# Patient Record
Sex: MALE | Race: WHITE | Employment: UNEMPLOYED | URBAN - METROPOLITAN AREA
[De-identification: names, ages, dates, MRNs, and addresses within clinical notes are randomized per-mention and may not be internally consistent; named-entity substitution may affect disease eponyms.]

---

## 2024-08-03 ENCOUNTER — HOSPITAL ENCOUNTER (INPATIENT)
Facility: HOSPITAL | Age: 69
LOS: 58 days | Discharge: HOME | DRG: 885 | End: 2024-09-30
Attending: EMERGENCY MEDICINE | Admitting: PSYCHIATRY & NEUROLOGY
Payer: MEDICARE

## 2024-08-03 DIAGNOSIS — F32.9 MAJOR DEPRESSIVE DISORDER WITHOUT PSYCHOTIC FEATURES: Primary | ICD-10-CM

## 2024-08-03 DIAGNOSIS — F41.1 ANXIETY STATE: ICD-10-CM

## 2024-08-03 DIAGNOSIS — F32.A DEPRESSION, UNSPECIFIED DEPRESSION TYPE: ICD-10-CM

## 2024-08-03 DIAGNOSIS — R45.851 SUICIDAL IDEATION: ICD-10-CM

## 2024-08-03 LAB
ALBUMIN SERPL-MCNC: 4.3 G/DL (ref 3.4–5)
ALBUMIN/GLOB SERPL: 1.2 (ref 0.8–1.7)
ALP SERPL-CCNC: 130 U/L (ref 45–117)
ALT SERPL-CCNC: 24 U/L (ref 16–61)
AMPHET UR QL SCN: NEGATIVE
ANION GAP SERPL CALC-SCNC: 3 MMOL/L (ref 3–18)
AST SERPL-CCNC: 18 U/L (ref 10–38)
BARBITURATES UR QL SCN: NEGATIVE
BASOPHILS # BLD: 0 K/UL (ref 0–0.1)
BASOPHILS NFR BLD: 1 % (ref 0–2)
BENZODIAZ UR QL: NEGATIVE
BILIRUB SERPL-MCNC: 0.7 MG/DL (ref 0.2–1)
BUN SERPL-MCNC: 12 MG/DL (ref 7–18)
BUN/CREAT SERPL: 8 (ref 12–20)
CALCIUM SERPL-MCNC: 9.9 MG/DL (ref 8.5–10.1)
CANNABINOIDS UR QL SCN: NEGATIVE
CHLORIDE SERPL-SCNC: 109 MMOL/L (ref 100–111)
CO2 SERPL-SCNC: 27 MMOL/L (ref 21–32)
COCAINE UR QL SCN: NEGATIVE
CREAT SERPL-MCNC: 1.46 MG/DL (ref 0.6–1.3)
DIFFERENTIAL METHOD BLD: ABNORMAL
EOSINOPHIL # BLD: 0.2 K/UL (ref 0–0.4)
EOSINOPHIL NFR BLD: 4 % (ref 0–5)
ERYTHROCYTE [DISTWIDTH] IN BLOOD BY AUTOMATED COUNT: 12.4 % (ref 11.6–14.5)
ETHANOL SERPL-MCNC: <3 MG/DL (ref 0–3)
FLUAV RNA SPEC QL NAA+PROBE: NOT DETECTED
FLUBV RNA SPEC QL NAA+PROBE: NOT DETECTED
GLOBULIN SER CALC-MCNC: 3.6 G/DL (ref 2–4)
GLUCOSE SERPL-MCNC: 100 MG/DL (ref 74–99)
HCT VFR BLD AUTO: 44.2 % (ref 36–48)
HGB BLD-MCNC: 14.8 G/DL (ref 13–16)
IMM GRANULOCYTES # BLD AUTO: 0 K/UL (ref 0–0.04)
IMM GRANULOCYTES NFR BLD AUTO: 0 % (ref 0–0.5)
LYMPHOCYTES # BLD: 0.9 K/UL (ref 0.9–3.6)
LYMPHOCYTES NFR BLD: 16 % (ref 21–52)
Lab: NORMAL
MCH RBC QN AUTO: 31.6 PG (ref 24–34)
MCHC RBC AUTO-ENTMCNC: 33.5 G/DL (ref 31–37)
MCV RBC AUTO: 94.2 FL (ref 78–100)
METHADONE UR QL: NEGATIVE
MONOCYTES # BLD: 0.5 K/UL (ref 0.05–1.2)
MONOCYTES NFR BLD: 9 % (ref 3–10)
NEUTS SEG # BLD: 4.3 K/UL (ref 1.8–8)
NEUTS SEG NFR BLD: 71 % (ref 40–73)
NRBC # BLD: 0 K/UL (ref 0–0.01)
NRBC BLD-RTO: 0 PER 100 WBC
OPIATES UR QL: NEGATIVE
PCP UR QL: NEGATIVE
PLATELET # BLD AUTO: 208 K/UL (ref 135–420)
PMV BLD AUTO: 8.5 FL (ref 9.2–11.8)
POTASSIUM SERPL-SCNC: 4.4 MMOL/L (ref 3.5–5.5)
PROT SERPL-MCNC: 7.9 G/DL (ref 6.4–8.2)
RBC # BLD AUTO: 4.69 M/UL (ref 4.35–5.65)
SARS-COV-2 RNA RESP QL NAA+PROBE: NOT DETECTED
SODIUM SERPL-SCNC: 139 MMOL/L (ref 136–145)
WBC # BLD AUTO: 6 K/UL (ref 4.6–13.2)

## 2024-08-03 PROCEDURE — 93005 ELECTROCARDIOGRAM TRACING: CPT | Performed by: EMERGENCY MEDICINE

## 2024-08-03 PROCEDURE — 6370000000 HC RX 637 (ALT 250 FOR IP): Performed by: EMERGENCY MEDICINE

## 2024-08-03 PROCEDURE — 87636 SARSCOV2 & INF A&B AMP PRB: CPT

## 2024-08-03 PROCEDURE — 82077 ASSAY SPEC XCP UR&BREATH IA: CPT

## 2024-08-03 PROCEDURE — 85025 COMPLETE CBC W/AUTO DIFF WBC: CPT

## 2024-08-03 PROCEDURE — 1240000000 HC EMOTIONAL WELLNESS R&B

## 2024-08-03 PROCEDURE — 80053 COMPREHEN METABOLIC PANEL: CPT

## 2024-08-03 PROCEDURE — 6370000000 HC RX 637 (ALT 250 FOR IP): Performed by: PSYCHIATRY & NEUROLOGY

## 2024-08-03 PROCEDURE — 90791 PSYCH DIAGNOSTIC EVALUATION: CPT | Performed by: SOCIAL WORKER

## 2024-08-03 PROCEDURE — 80307 DRUG TEST PRSMV CHEM ANLYZR: CPT

## 2024-08-03 PROCEDURE — 99285 EMERGENCY DEPT VISIT HI MDM: CPT

## 2024-08-03 RX ORDER — VITAMIN B COMPLEX
2000 TABLET ORAL DAILY
Status: DISCONTINUED | OUTPATIENT
Start: 2024-08-04 | End: 2024-09-30 | Stop reason: HOSPADM

## 2024-08-03 RX ORDER — DULOXETIN HYDROCHLORIDE 20 MG/1
60 CAPSULE, DELAYED RELEASE ORAL 2 TIMES DAILY
Status: DISCONTINUED | OUTPATIENT
Start: 2024-08-03 | End: 2024-09-30 | Stop reason: HOSPADM

## 2024-08-03 RX ORDER — TRAZODONE HYDROCHLORIDE 50 MG/1
50 TABLET, FILM COATED ORAL NIGHTLY PRN
Status: DISCONTINUED | OUTPATIENT
Start: 2024-08-03 | End: 2024-09-30 | Stop reason: HOSPADM

## 2024-08-03 RX ORDER — CLONAZEPAM 1 MG/1
1 TABLET ORAL 2 TIMES DAILY
Status: DISCONTINUED | OUTPATIENT
Start: 2024-08-03 | End: 2024-08-04

## 2024-08-03 RX ORDER — LORAZEPAM 1 MG/1
2 TABLET ORAL ONCE
Status: COMPLETED | OUTPATIENT
Start: 2024-08-03 | End: 2024-08-03

## 2024-08-03 RX ORDER — MAGNESIUM HYDROXIDE/ALUMINUM HYDROXICE/SIMETHICONE 120; 1200; 1200 MG/30ML; MG/30ML; MG/30ML
30 SUSPENSION ORAL EVERY 6 HOURS PRN
Status: DISCONTINUED | OUTPATIENT
Start: 2024-08-03 | End: 2024-09-30 | Stop reason: HOSPADM

## 2024-08-03 RX ORDER — ATORVASTATIN CALCIUM 20 MG/1
20 TABLET, FILM COATED ORAL NIGHTLY
Status: DISCONTINUED | OUTPATIENT
Start: 2024-08-03 | End: 2024-09-30 | Stop reason: HOSPADM

## 2024-08-03 RX ORDER — HYDROXYZINE HYDROCHLORIDE 25 MG/1
25 TABLET, FILM COATED ORAL EVERY 6 HOURS PRN
Status: DISCONTINUED | OUTPATIENT
Start: 2024-08-03 | End: 2024-08-06

## 2024-08-03 RX ORDER — ACETAMINOPHEN 325 MG/1
650 TABLET ORAL EVERY 4 HOURS PRN
Status: DISCONTINUED | OUTPATIENT
Start: 2024-08-03 | End: 2024-09-30 | Stop reason: HOSPADM

## 2024-08-03 RX ORDER — ASPIRIN 81 MG/1
81 TABLET ORAL DAILY
Status: DISCONTINUED | OUTPATIENT
Start: 2024-08-04 | End: 2024-09-30 | Stop reason: HOSPADM

## 2024-08-03 RX ADMIN — DULOXETINE HYDROCHLORIDE 60 MG: 30 CAPSULE, DELAYED RELEASE ORAL at 21:19

## 2024-08-03 RX ADMIN — CLONAZEPAM 1 MG: 1 TABLET ORAL at 21:20

## 2024-08-03 RX ADMIN — AMITRIPTYLINE HYDROCHLORIDE 75 MG: 25 TABLET, FILM COATED ORAL at 22:28

## 2024-08-03 RX ADMIN — HYDROXYZINE HYDROCHLORIDE 25 MG: 25 TABLET, FILM COATED ORAL at 21:19

## 2024-08-03 RX ADMIN — LAMOTRIGINE 150 MG: 100 TABLET ORAL at 21:19

## 2024-08-03 RX ADMIN — ATORVASTATIN CALCIUM 20 MG: 20 TABLET, FILM COATED ORAL at 21:20

## 2024-08-03 RX ADMIN — LORAZEPAM 2 MG: 1 TABLET ORAL at 17:47

## 2024-08-03 ASSESSMENT — SLEEP AND FATIGUE QUESTIONNAIRES
DO YOU HAVE DIFFICULTY SLEEPING: NO
DO YOU USE A SLEEP AID: NO
AVERAGE NUMBER OF SLEEP HOURS: 7

## 2024-08-03 ASSESSMENT — PAIN - FUNCTIONAL ASSESSMENT: PAIN_FUNCTIONAL_ASSESSMENT: NONE - DENIES PAIN

## 2024-08-03 ASSESSMENT — LIFESTYLE VARIABLES
HOW OFTEN DO YOU HAVE A DRINK CONTAINING ALCOHOL: MONTHLY OR LESS
HOW MANY STANDARD DRINKS CONTAINING ALCOHOL DO YOU HAVE ON A TYPICAL DAY: 1 OR 2

## 2024-08-03 NOTE — ED NOTES
Report called and given to LUCA Aldana. [facility] accepted pt. Per Jovan, room not ready for patient and asked to give 15 minutes to finish preparing room.  Will transport pt up when room is ready.

## 2024-08-03 NOTE — ED NOTES
Pt very restless and fidgety, asking for something to help calm him. Pt states the ativan has not helped at all and it is only getting worst. Pts wife  at bedside.

## 2024-08-03 NOTE — ED TRIAGE NOTES
Pt in ED via EMS EMS for suicidal ideations. Pt states he has been battling depression for a while and today his wife left while he was asleep for a few hours and her leaving him for so long triggered his depression and made him feel suicidal. Pt states he has a gun locker at home and planned to take one of his guns and go somewhere and shoot himself.

## 2024-08-03 NOTE — ED TRIAGE NOTES
Pt brought by EMS ,pt stated that he starting to have thoughts of harming himself. No specific plan at this time

## 2024-08-03 NOTE — BSMART NOTE
Chief Complaint   Patient presents with    Suicidal       Patient was evaluated by Tele-Psych who recommends inpatient psychiatric treatment for suicidal ideations with a plan to shoot self. Does have guns in the home. Wife is with patient in the ER and states the guns will be removed while he is being treated in the hospital.    Patient is voluntary for inpatient treatment.    Past Medical History:   Diagnosis Date    Anxiety and depression     Cervical radiculopathy     Dr. Rahman    Diverticulitis     Fatty liver disease, nonalcoholic 10/14    ALT up    Gout     Hep C w/o coma, chronic (HCC)     Herniated disc, cervical     C5-C6    Hydrocele     Hydrocele     Hyperlipidemia     Insomnia     Kidney stone     Sleep apnea     uses CPAP    Stroke (HCC)     TIA in the past but cannot remember       Prior to Visit Medications    Medication Sig Taking? Authorizing Provider   clonazePAM (KLONOPIN) 1 MG tablet Take 1.5 tablets by mouth 2 times daily.  Automatic Reconciliation, Ar   colchicine (COLCRYS) 0.6 MG tablet Take 0.6 mg by mouth 2 times daily as needed  Patient not taking: Reported on 8/3/2024  Automatic Reconciliation, Ar   DULoxetine (CYMBALTA) 60 MG extended release capsule Take 1 capsule by mouth 2 times daily  Automatic Reconciliation, Ar   hydrOXYzine pamoate (VISTARIL) 50 MG capsule Take 50 mg by mouth daily  Patient not taking: Reported on 8/3/2024  Automatic Reconciliation, Ar   ibuprofen (ADVIL;MOTRIN) 800 MG tablet Take 800 mg by mouth every 8 hours as needed  Automatic Reconciliation, Ar   lamoTRIgine (LAMICTAL) 100 MG tablet Take 1 tablet by mouth 2 times daily  Automatic Reconciliation, Ar         The following labs and vitals were reviewed with on-call psychiatrist.    CMP, CBC, ETOH, UDS, SARS- CoV-2-PCR, and EKG    Vitals:    08/03/24 1500   BP: (!) 144/88   Pulse: 77   Resp: 18   Temp: 99.2 °F (37.3 °C)   SpO2: 96%         Writer met with patient to assess the following    Ambulation - Patient

## 2024-08-03 NOTE — VIRTUAL HEALTH
Hernandez Franz  [id number]  1955     Social Work Behavioral Health Crisis Assessment    08/03/24    Chief Complaint: Plans to kill self with gun     HPI: Patient is a 68 y.o. White (non-) male who presents for psychiatric evaluation. Patient presented to the ED on 08/03/24 from home.    Past Psychiatric History:  Previous Diagnoses/symptoms: depression and anxiety   Previous suicide attempts/self-harm: Denies  Inpatient psychiatric hospitalizations: no  Current outpatient psychiatric provider: [facility]   Current therapist: States not in therapy  Previous psychiatric medication trials: No prior medication trials  Current psychiatric medications: No current psychiatric medications  Family Psychiatric History: Denies    Sleep Hours: 7.5    Sleep concerns: difficulty attaining sleep    Use of sleep medications: denies    Substance Abuse History:  Tobacco: Denies  Alcohol: Denies  Marijuana: Denies  Stimulant: Denies  Opiates: Denies  Benzodiazepine: Denies  Other illicit drug usage: Denies  History of substance/alcohol abuse treatment: Denies    Social History:  Education: H.S.  Living Situation/Interest: Lives with wife and dog   Marital/Committed relationship and parenting hx:   Occupation: retired   Legal History/Hx of Violence: denied   Spiritual History: Restorationism   Psychological trauma, neglect, or abuse: denies hx of trauma/abuse   Access to guns or other weapons: gun in the home, and will be removing the guns     Past Medical History:  Active Ambulatory Problems     Diagnosis Date Noted    Fatty liver disease, nonalcoholic     Diverticulitis     Prediabetes     Diverticulosis of large intestine without hemorrhage 02/22/2016    Cervical radiculopathy     Herniated disc, cervical     Elevated fasting glucose     Hep C w/o coma, chronic (HCC)     Anxiety and depression     Hyperlipidemia     Hydrocele, left 08/06/2013    Gout     Erectile dysfunction 02/22/2016     Resolved

## 2024-08-03 NOTE — ED PROVIDER NOTES
EMERGENCY DEPARTMENT HISTORY AND PHYSICAL EXAM      Patient Name: Hernandez Franz  MRN: [id number]  YOB: 1955  Provider: Monique Gunn MD  PCP: Tiffanie Concepcion DO   Time/Date of evaluation: 4:47 PM EDT on 8/3/24    History of Presenting Illness     Chief Complaint   Patient presents with    Suicidal       History Provided By: Patient and Patient's Wife     History (Narative):   Hernandez Franz is a 68 y.o. male with a PMHX of anxiety and depression, gout, hepatitis C, hyperlipidemia, insomnia, kidney stones, and a prior TIA  who presents to the emergency department  by EMS C/O suicidal ideation.  The patient and his wife stated that he was having a lot of anxiety yesterday.  Today, his wife left for a little while.  He started to get very anxious and was very panicky.  He thought about getting his gun and shooting himself.  He called his wife and told her to come right back home because she was just little bit of the street.  When she came home, she tried to find him a Klonopin pill to take because that usually helps with his anxiety.  At that point, they decided to call EMS.    The patient has had 2 prior inpatient hospitalizations for psychiatric issues in the past.        Past History     Past Medical History:  Past Medical History:   Diagnosis Date    Anxiety and depression     Cervical radiculopathy     Dr. Rahman    Diverticulitis     Fatty liver disease, nonalcoholic 10/14    ALT up    Gout     Hep C w/o coma, chronic (HCC)     Herniated disc, cervical     C5-C6    Hydrocele     Hydrocele     Hyperlipidemia     Insomnia     Kidney stone     Sleep apnea     uses CPAP    Stroke (HCC)     TIA in the past but cannot remember       Past Surgical History:  Past Surgical History:   Procedure Laterality Date    CARPAL TUNNEL RELEASE      COLONOSCOPY  12/15    repeat 12/20 - Dr. Escalona    LUMBAR DISCECTOMY      L4-L5    MOHS SURGERY      OTHER SURGICAL HISTORY      hemorrhoid    OTHER SURGICAL

## 2024-08-04 LAB
EKG ATRIAL RATE: 67 BPM
EKG DIAGNOSIS: NORMAL
EKG P AXIS: 39 DEGREES
EKG P-R INTERVAL: 160 MS
EKG Q-T INTERVAL: 404 MS
EKG QRS DURATION: 86 MS
EKG QTC CALCULATION (BAZETT): 426 MS
EKG R AXIS: 12 DEGREES
EKG T AXIS: 7 DEGREES
EKG VENTRICULAR RATE: 67 BPM

## 2024-08-04 PROCEDURE — 1240000000 HC EMOTIONAL WELLNESS R&B

## 2024-08-04 PROCEDURE — 99222 1ST HOSP IP/OBS MODERATE 55: CPT | Performed by: PSYCHIATRY & NEUROLOGY

## 2024-08-04 PROCEDURE — 6370000000 HC RX 637 (ALT 250 FOR IP): Performed by: PSYCHIATRY & NEUROLOGY

## 2024-08-04 PROCEDURE — 2580000003 HC RX 258: Performed by: PSYCHIATRY & NEUROLOGY

## 2024-08-04 PROCEDURE — 6360000002 HC RX W HCPCS: Performed by: PSYCHIATRY & NEUROLOGY

## 2024-08-04 PROCEDURE — 93010 ELECTROCARDIOGRAM REPORT: CPT | Performed by: INTERNAL MEDICINE

## 2024-08-04 RX ORDER — COLCHICINE 0.6 MG/1
1.2 CAPSULE ORAL
Status: DISPENSED | OUTPATIENT
Start: 2024-08-04 | End: 2024-08-05

## 2024-08-04 RX ORDER — LORAZEPAM 2 MG/ML
2 INJECTION INTRAMUSCULAR ONCE
Status: DISCONTINUED | OUTPATIENT
Start: 2024-08-04 | End: 2024-08-04

## 2024-08-04 RX ORDER — COLCHICINE 0.6 MG/1
0.6 CAPSULE ORAL 2 TIMES DAILY PRN
Status: DISCONTINUED | OUTPATIENT
Start: 2024-08-05 | End: 2024-08-19

## 2024-08-04 RX ORDER — LORAZEPAM 1 MG/1
2 TABLET ORAL EVERY 6 HOURS PRN
Status: DISCONTINUED | OUTPATIENT
Start: 2024-08-04 | End: 2024-08-06

## 2024-08-04 RX ORDER — LORAZEPAM 2 MG/ML
2 INJECTION INTRAMUSCULAR ONCE
Status: COMPLETED | OUTPATIENT
Start: 2024-08-04 | End: 2024-08-04

## 2024-08-04 RX ORDER — LORAZEPAM 1 MG/1
1 TABLET ORAL 3 TIMES DAILY
Status: DISCONTINUED | OUTPATIENT
Start: 2024-08-04 | End: 2024-08-09

## 2024-08-04 RX ADMIN — Medication 2000 UNITS: at 08:50

## 2024-08-04 RX ADMIN — CLONAZEPAM 1 MG: 1 TABLET ORAL at 08:51

## 2024-08-04 RX ADMIN — ATORVASTATIN CALCIUM 20 MG: 20 TABLET, FILM COATED ORAL at 20:19

## 2024-08-04 RX ADMIN — WATER 5 MG: 1 INJECTION INTRAMUSCULAR; INTRAVENOUS; SUBCUTANEOUS at 20:13

## 2024-08-04 RX ADMIN — LAMOTRIGINE 150 MG: 100 TABLET ORAL at 20:19

## 2024-08-04 RX ADMIN — DULOXETINE HYDROCHLORIDE 60 MG: 30 CAPSULE, DELAYED RELEASE ORAL at 20:18

## 2024-08-04 RX ADMIN — LAMOTRIGINE 150 MG: 100 TABLET ORAL at 08:51

## 2024-08-04 RX ADMIN — ASPIRIN 81 MG: 81 TABLET, COATED ORAL at 08:51

## 2024-08-04 RX ADMIN — AMITRIPTYLINE HYDROCHLORIDE 75 MG: 25 TABLET, FILM COATED ORAL at 20:19

## 2024-08-04 RX ADMIN — HYDROXYZINE HYDROCHLORIDE 25 MG: 25 TABLET, FILM COATED ORAL at 18:02

## 2024-08-04 RX ADMIN — LORAZEPAM 2 MG: 2 INJECTION INTRAMUSCULAR; INTRAVENOUS at 20:12

## 2024-08-04 RX ADMIN — DULOXETINE HYDROCHLORIDE 60 MG: 30 CAPSULE, DELAYED RELEASE ORAL at 08:50

## 2024-08-04 NOTE — H&P
NEW ADMISSION PSYCHIATRIC H&P    IDENTIFYING INFORMATION  The patient is a 68-year-old male, , admitted to [facility] voluntarily on the above-mentioned date.    BASIS FOR ADMISSION  The patient was admitted after he was brought in to the emergency department by EMS, complaining of suicidal ideation and fears of losing control.  Patient known to the undersigned since I had opportunity of providing him with outpatient psychiatric care many years ago, and is being followed in our practice by my associate, Dr. Obi Caldwell, has had a history of depression and anxiety deformity multifactorial and for many years now.  This time however the patient became increasingly helpless and hopeless thought about getting his gun and shooting himself.  Noticing himself that he was getting very close to harming himself, the patient called his wife and asked her to come back home.  After doing so, the patient fell himself under somewhat better control, however still depressed and actively suicidal to make the appropriate decision to come to the hospital for further evaluation and possible admission.    PSYCHIATRIC HISTORY  As stated above, the patient has had a chronic history of anxiety and also depression.  I had opportunity of providing him with outpatient care through my practice at [facility], but in addition I was also able to provide him with 1 inpatient hospitalization at [facility] this happening between 2016 or so, or 2018 with his care being eventually transferred to my associate Dr. Caldwell due to his being more available the undersigned to provide with evening outpatient hours.  Prior to Dr. Caldwell and my involvement however the patient had seeing Mr. Gab Flores NP also working after [facility] with the patient describing improvement throughout the years with the severity of his symptoms.  However at present, the patient

## 2024-08-04 NOTE — CONSULTS
[facility]  FAMILY MEDICINE CONSULT NOTE FOR BEHAVIORAL HEALTH UNIT    Patient:    Hernandez Franz , 68 y.o. male   Room/Bed:  112/01  Admission Date:   8/3/2024  Code status:  Full Code      Reason for Consult: medical management  Psychiatry Attending Requesting Consult: Dr. Obi Caldwell MD    ASSESSMENT:  Hernandez Franz is a 68 y.o. male w/ a PMH of anxiety and depression, gout, hepatitis C, hyperlipidemia, insomnia, kidney stones, and a prior TIA presenting for suicidal ideation with plan who is now admitted to the [facility].    Nurse Chaperone during History and Physical:     PLAN:    Depression with Sucidal ideation   -Amitriptyline 75 mg nightly  -Clonazepam 1 mg twice daily  -Duloxetine 60 mg twice daily  -Lamotrigine 150 mg twice daily  -Hydroxyzine 25 mg every 6 hours as needed  -Trazodone 50 mg nightly as needed  -Management per inpatient psychiatry    Hx of TIA  -ASA 81mg  -Atorvastatin 20mg nightly      Gout of R wrist   -pt reports flair 3-4 days ago  -has adverse reaction to allopurinol   Plan:  -Colchicine as needed for flare, okay to use with current CrCl      CKD stage III A  Creatinine in ER appears to be around baseline  Plan:  -Avoid nephrotoxic medications      Global  Diet: regular  Mobility: as tolerated    Thank you for this consult.     SUBJECTIVE:   Dr. Obi Caldwell MD has consulted Family Medicine to evaluate Hernandez Franz  in the Emergency Department. He  a 68 y.o. male with a PMHX of anxiety and depression, gout, hepatitis C, hyperlipidemia, insomnia, kidney stones, and a prior TIA  who presents to the emergency department  by EMS C/O suicidal ideation. Per ER note: The patient and his wife stated that he was having a lot of anxiety day prior to admission.  On the day of admission, his wife left for a little while.  He started to get very anxious and was very panicky.  He thought about getting his gun and shooting himself.  He called his

## 2024-08-05 PROCEDURE — 6370000000 HC RX 637 (ALT 250 FOR IP): Performed by: PSYCHIATRY & NEUROLOGY

## 2024-08-05 PROCEDURE — 1240000000 HC EMOTIONAL WELLNESS R&B

## 2024-08-05 PROCEDURE — 99232 SBSQ HOSP IP/OBS MODERATE 35: CPT | Performed by: PSYCHIATRY & NEUROLOGY

## 2024-08-05 RX ORDER — AMITRIPTYLINE HYDROCHLORIDE 50 MG/1
100 TABLET ORAL NIGHTLY
Status: DISCONTINUED | OUTPATIENT
Start: 2024-08-05 | End: 2024-09-05

## 2024-08-05 RX ADMIN — LORAZEPAM 1 MG: 1 TABLET ORAL at 14:08

## 2024-08-05 RX ADMIN — ASPIRIN 81 MG: 81 TABLET, COATED ORAL at 08:03

## 2024-08-05 RX ADMIN — ATORVASTATIN CALCIUM 20 MG: 20 TABLET, FILM COATED ORAL at 20:17

## 2024-08-05 RX ADMIN — LAMOTRIGINE 150 MG: 100 TABLET ORAL at 08:03

## 2024-08-05 RX ADMIN — DULOXETINE HYDROCHLORIDE 60 MG: 30 CAPSULE, DELAYED RELEASE ORAL at 08:03

## 2024-08-05 RX ADMIN — LORAZEPAM 1 MG: 1 TABLET ORAL at 20:16

## 2024-08-05 RX ADMIN — TRAZODONE HYDROCHLORIDE 50 MG: 50 TABLET ORAL at 20:17

## 2024-08-05 RX ADMIN — LAMOTRIGINE 150 MG: 100 TABLET ORAL at 20:15

## 2024-08-05 RX ADMIN — Medication 2000 UNITS: at 08:03

## 2024-08-05 RX ADMIN — LORAZEPAM 1 MG: 1 TABLET ORAL at 08:03

## 2024-08-05 RX ADMIN — AMITRIPTYLINE HYDROCHLORIDE 100 MG: 50 TABLET, FILM COATED ORAL at 20:17

## 2024-08-05 RX ADMIN — DULOXETINE HYDROCHLORIDE 60 MG: 30 CAPSULE, DELAYED RELEASE ORAL at 20:19

## 2024-08-05 NOTE — GROUP NOTE
Art Therapy Group Progress Note    PATIENT SCHEDULED FOR GROUP AT: 1:00 PM     GROUP STOP TIME:  1:45 PM        ATTENDANCE: Moderate (8/13 participants)     TOPIC / FOCUS:  [place]     GOALS:  promote positive thinking skills, encourage self-esteem, identify positive traits in self, develop communication skills, promote goal setting skills, encourage focus, increase self-awareness, promote creativity    Notes:  Pt appeared to have difficulty understanding the task directions. Pt did not appear to be confident in their response to the task. Pt was able to identify positive traits. Pt engagement in art making was minimal and low energy. Pt shared in group discussion when called upon.     Status After Intervention:  Unchanged    Participation Level: Minimal    Participation Quality: Appropriate      Speech:  normal      Thought Process/Content: Logical      Affective Functioning: Congruent      Mood: anxious and dysphoric      Level of consciousness:  Alert      Response to Learning: Able to verbalize current knowledge/experience      Endings: None Reported    Modes of Intervention: Education, Support, Socialization, Exploration, and Clarifying      Discipline Responsible: Psychoeducational Specialist      Humza Nixon  Art Therapist, MA, ATR-P  Provisional Registered Art Therapist

## 2024-08-05 NOTE — GROUP NOTE
Group Therapy Note    Date: 8/5/2024    Group Start Time: 1000  Group End Time: 1040  Group Topic: Recreational    [facility]    Kirsten Clark        Group Therapy Note    Attendees: 7/15    Group type: Exercise    Group Focus: This recreational group engaged patients in physical activity.  Recreational therapists led group members in guided exercises. Patients also discussed coping skills and the importance of using coping skills. This group may help reduce feelings of depression and stress and enhance, coping skills and mood.         Notes: Patient reported feeling like a (6 1-10, 10 best) at the start of group. Patient engaged in guided exercises and shared in discussions. Patient shared he spending time with his wife to help him cope, and shared they enjoy going to dinner together and talking walks.     Status After Intervention:  Unchanged    Participation Level: Active Listener and Interactive    Participation Quality: Appropriate, Attentive, and Sharing      Speech:  normal      Thought Process/Content: Logical      Affective Functioning: Congruent      Mood: euthymic      Level of consciousness:  Alert and Attentive      Response to Learning: Able to verbalize current knowledge/experience      Endings: None Reported    Modes of Intervention: Socialization, Movement, and Media      Discipline Responsible: Recreational Therapist      Signature:  KIRSTEN CLARK

## 2024-08-05 NOTE — GROUP NOTE
Group Therapy Note    Date: 8/5/2024    Group Start Time: 1500  Group End Time: 1550  Group Topic: Music Therapy      Toni Burger        Group Therapy Note    Attendees: 8/11    Group Focus: Music therapy group explored the idea of group members' ideal worlds and things they can control to get one step closer toward their ideal world. The group utilized marimar analysis, and a written worksheet utilizing metaphor to explore these concepts. The group may promote values exploration, future-focused thinking, goal setting, and self-awareness.       Notes:  Pt attended about the second half of group and actively engaged in the group. Pt discussed how his ideal world would include respect and self-control. Pt discussed how money management and controlling oneself would be steps to take. Pt discussed how when he feels anxious, he feels empty from head to toe, and discussed how he would likely be feeling anxiety at some point today.    Status After Intervention:  Unchanged    Participation Level: Interactive    Participation Quality: Appropriate, Attentive, Sharing, and Supportive      Speech:  normal      Thought Process/Content: Logical      Affective Functioning: Congruent      Mood: calm      Level of consciousness:  Alert and Attentive      Response to Learning: Able to verbalize current knowledge/experience      Endings: None Reported    Modes of Intervention: Support, Socialization, Exploration, and Media      Discipline Responsible: /Counselor      Signature:  LUCIAN Dickson, LPC  Board-Certified Music Therapist  Licensed Professional Counselor

## 2024-08-06 PROCEDURE — 6370000000 HC RX 637 (ALT 250 FOR IP): Performed by: PSYCHIATRY & NEUROLOGY

## 2024-08-06 PROCEDURE — 99232 SBSQ HOSP IP/OBS MODERATE 35: CPT | Performed by: PSYCHIATRY & NEUROLOGY

## 2024-08-06 PROCEDURE — 1240000000 HC EMOTIONAL WELLNESS R&B

## 2024-08-06 RX ORDER — HYDROXYZINE HYDROCHLORIDE 50 MG/1
50 TABLET, FILM COATED ORAL EVERY 6 HOURS PRN
Status: DISCONTINUED | OUTPATIENT
Start: 2024-08-06 | End: 2024-09-30 | Stop reason: HOSPADM

## 2024-08-06 RX ORDER — LORAZEPAM 1 MG/1
1 TABLET ORAL EVERY 6 HOURS PRN
Status: DISCONTINUED | OUTPATIENT
Start: 2024-08-06 | End: 2024-09-30 | Stop reason: HOSPADM

## 2024-08-06 RX ADMIN — Medication 2000 UNITS: at 07:35

## 2024-08-06 RX ADMIN — AMITRIPTYLINE HYDROCHLORIDE 100 MG: 50 TABLET, FILM COATED ORAL at 20:03

## 2024-08-06 RX ADMIN — DULOXETINE HYDROCHLORIDE 60 MG: 30 CAPSULE, DELAYED RELEASE ORAL at 07:35

## 2024-08-06 RX ADMIN — LORAZEPAM 1 MG: 1 TABLET ORAL at 20:03

## 2024-08-06 RX ADMIN — ASPIRIN 81 MG: 81 TABLET, COATED ORAL at 07:35

## 2024-08-06 RX ADMIN — LAMOTRIGINE 150 MG: 100 TABLET ORAL at 20:04

## 2024-08-06 RX ADMIN — LAMOTRIGINE 150 MG: 100 TABLET ORAL at 07:35

## 2024-08-06 RX ADMIN — LORAZEPAM 1 MG: 1 TABLET ORAL at 13:03

## 2024-08-06 RX ADMIN — TRAZODONE HYDROCHLORIDE 50 MG: 50 TABLET ORAL at 20:04

## 2024-08-06 RX ADMIN — DULOXETINE HYDROCHLORIDE 60 MG: 30 CAPSULE, DELAYED RELEASE ORAL at 20:04

## 2024-08-06 RX ADMIN — HYDROXYZINE HYDROCHLORIDE 25 MG: 25 TABLET, FILM COATED ORAL at 19:52

## 2024-08-06 RX ADMIN — ATORVASTATIN CALCIUM 20 MG: 20 TABLET, FILM COATED ORAL at 20:03

## 2024-08-06 RX ADMIN — LORAZEPAM 1 MG: 1 TABLET ORAL at 07:35

## 2024-08-06 NOTE — GROUP NOTE
Group Therapy Note    Date: 2024    Group Start Time: 1400  Group End Time: 1450  Group Topic: Music Therapy      Toni Burger        Group Therapy Note    Attendees:     Group Focus: Music therapy group explored values and wisdom or life advice through the intervention of marimar argueta. Topics discussed included living too fast, substance abuse, taking things one step at a time, enjoying the present, grief and loss, and how troubles will come and pass. The group may promote self-awareness, self-expression, and use of music as a coping skill.       Notes:  Pt left group to meet with staff as part of treatment and returned to group. Pt discussed how he was taking care of his father until his father . Pt processed emotions associated with his mother's death. Pt discussed how he wished he listened to advice from his parents. Pt discussed how he has a tendency towards negative thinking and would like to appreciate more of the positives. Pt discussed how he receives outpatient services.    Status After Intervention:  Unchanged    Participation Level: Interactive    Participation Quality: Appropriate, Attentive, and Sharing      Speech:  normal      Thought Process/Content: Logical      Affective Functioning: Congruent      Mood: depressed      Level of consciousness:  Alert and Attentive      Response to Learning: Able to verbalize current knowledge/experience, Able to verbalize/acknowledge new learning, and Able to retain information      Endings: None Reported    Modes of Intervention: Support, Socialization, Exploration, and Media      Discipline Responsible: /Counselor      Signature:  LUCIAN Dickson, LPC  Board-Certified Music Therapist  Licensed Professional Counselor

## 2024-08-06 NOTE — GROUP NOTE
Group Therapy Note    Date: 2024    Group Start Time: 930  Group End Time: 103  Group Topic: Music Therapy    Toni Burger        Group Therapy Note    Attendees:     Group Focus: Music therapy group consisted of marimar analysis of several patient-preferred songs around the theme of finding hope or a positive mindset. Group members identified and processed lyrics in relation to their lives and lyrics that promote hope. The group also processed topics including seeking support, gratitude, spirituality, and masking emotions.        Notes:  Pt actively engaged in group through discussion of music and writing notes on worksheet. Pt shared a song that he stated was played at his father's . Pt also discussed topics related to support, gratitude, and spirituality.    Status After Intervention:  Unchanged    Participation Level: Interactive    Participation Quality: Appropriate, Attentive, and Sharing      Speech:  normal      Thought Process/Content: Logical      Affective Functioning: Congruent      Mood: Sad      Level of consciousness:  Alert and Attentive      Response to Learning: Able to verbalize current knowledge/experience, Able to verbalize/acknowledge new learning, Able to retain information, and Capable of insight      Endings: None Reported    Modes of Intervention: Support, Socialization, Exploration, and Media      Discipline Responsible: /Counselor      Signature:  LUCIAN Dickson, LPC  Board-Certified Music Therapist  Licensed Professional Counselor

## 2024-08-06 NOTE — GROUP NOTE
Art Therapy Group Progress Note    PATIENT SCHEDULED FOR GROUP AT: 1:00 PM     GROUP STOP TIME:  1:45 PM        ATTENDANCE: Moderate (8/14 participants)     TOPIC / FOCUS:  [place]     GOALS:  identify obstacle or challenge to overcome, label goal, identify coping skills to support goals, encourage interpersonal awareness, promote positive coping skills, encourage creativity, increase problem-solving skills, encourage goal-oriented thinking, promote personal accountability, identify actionable goals     Notes:  Pt identified anxiety and depression as their obstacle. Pt identified coping skills including walking, their dog, and taking drives with their wife to the beach. Pt appeared to have difficulty with spelling.      Status After Intervention:  Unchanged    Participation Level: Interactive    Participation Quality: Appropriate, Attentive, and Sharing      Speech:  normal      Thought Process/Content: Logical      Affective Functioning: Congruent      Mood: anxious and dysphoric      Level of consciousness:  Alert      Response to Learning: Able to verbalize current knowledge/experience      Endings: None Reported    Modes of Intervention: Education, Support, Socialization, Exploration, and Clarifying      Discipline Responsible: Psychoeducational Specialist      Humza Nixon  Art Therapist, MA, ATR-P  Provisional Registered Art Therapist

## 2024-08-07 PROCEDURE — 6370000000 HC RX 637 (ALT 250 FOR IP): Performed by: PSYCHIATRY & NEUROLOGY

## 2024-08-07 PROCEDURE — 99232 SBSQ HOSP IP/OBS MODERATE 35: CPT | Performed by: PSYCHIATRY & NEUROLOGY

## 2024-08-07 PROCEDURE — 1240000000 HC EMOTIONAL WELLNESS R&B

## 2024-08-07 RX ADMIN — TRAZODONE HYDROCHLORIDE 50 MG: 50 TABLET ORAL at 20:03

## 2024-08-07 RX ADMIN — LAMOTRIGINE 150 MG: 100 TABLET ORAL at 08:09

## 2024-08-07 RX ADMIN — LORAZEPAM 1 MG: 1 TABLET ORAL at 14:03

## 2024-08-07 RX ADMIN — AMITRIPTYLINE HYDROCHLORIDE 100 MG: 50 TABLET, FILM COATED ORAL at 20:03

## 2024-08-07 RX ADMIN — LAMOTRIGINE 150 MG: 100 TABLET ORAL at 20:02

## 2024-08-07 RX ADMIN — DULOXETINE HYDROCHLORIDE 60 MG: 30 CAPSULE, DELAYED RELEASE ORAL at 08:10

## 2024-08-07 RX ADMIN — Medication 2000 UNITS: at 08:09

## 2024-08-07 RX ADMIN — DULOXETINE HYDROCHLORIDE 60 MG: 30 CAPSULE, DELAYED RELEASE ORAL at 20:03

## 2024-08-07 RX ADMIN — LORAZEPAM 1 MG: 1 TABLET ORAL at 08:10

## 2024-08-07 RX ADMIN — ATORVASTATIN CALCIUM 20 MG: 20 TABLET, FILM COATED ORAL at 20:03

## 2024-08-07 RX ADMIN — LORAZEPAM 1 MG: 1 TABLET ORAL at 20:03

## 2024-08-07 RX ADMIN — ASPIRIN 81 MG: 81 TABLET, COATED ORAL at 08:10

## 2024-08-07 NOTE — GROUP NOTE
Group Therapy Note    Date: 8/7/2024    Group Start Time: 0935  Group End Time: 1025  Group Topic: Recreational    [facility]    Kirsten Clark        Group Therapy Note    Attendees: 8/13    Group Type: Stress Bingo      Group: Focus: Recreational therapy groups engaged patients through a game that focused on stress. Patients examined: external stressors, internal stressors, physical stress symptoms, emotional/behavioral stress symptoms, and stress relievers. Group may help enhance social and coping skills, and decrease stress, depression and anxiety.       Notes: Patient reported feeling \"here\" at the start of group. Patient participated in the group game and contributed to discussions. PT identified finances as a stressor. Patient at one point expressed he feels numb, and shared he internalize his emotions. Patient was able to identify physical symptoms of stress.      Status After Intervention:  Unchanged    Participation Level: Active Listener and Interactive    Participation Quality: Appropriate, Attentive, and Sharing      Speech:  normal      Thought Process/Content: Logical      Affective Functioning: Flat      Mood: depressed      Level of consciousness:  Alert and Attentive      Response to Learning: Able to verbalize current knowledge/experience      Endings: None Reported    Modes of Intervention: Education, Socialization, Clarifying, and Activity      Recreational Therapist  KIRSTEN CLARK

## 2024-08-07 NOTE — GROUP NOTE
Group Therapy Note    Date: 8/7/2024    Group Start Time: 1510  Group End Time: 1600  Group Topic: Recreational    [facility]    Jolly Clark        Group Therapy Note    Attendees: 4/11    Group Focus: Recreational therapy group engaged patients in karaoke.  This group may help release endorphins that reduce stress and anxiety and enhance mood and social interactions.          Notes: Patient did not attend group, having visit with wife.     Recreational Therapist   Jolly Clark

## 2024-08-07 NOTE — GROUP NOTE
Group Therapy Note    Date: 8/7/2024    Group Start Time: 1400  Group End Time: 1445  Group Topic: Music Therapy      Toni Burger        Group Therapy Note    Attendees: 7/11    Group Focus: Music therapy group explored themes related to emotional suppression, vulnerability to express emotion, stigma of mental health, and finding balance through the intervention of marimar analysis. The group closed with psychoeducation related to mindfulness of breath followed by a music-assisted mindful breathing exercise.       Notes:  Pt left group early to meet with staff as part of treatment. Pt reported feeling \"not that good\" at the start of group. Pt engaged in group discussion related to emotional suppression and discussed his wife.    Status After Intervention:  Unchanged    Participation Level: Interactive    Participation Quality: Appropriate      Speech:  normal      Thought Process/Content: Logical      Affective Functioning: Congruent      Mood: depressed      Level of consciousness:  Alert and Attentive      Response to Learning: Able to verbalize current knowledge/experience      Endings: None Reported    Modes of Intervention: Support, Socialization, Exploration, and Media      Discipline Responsible: /Counselor      Signature:  LUCIAN Dickson, LPC  Board-Certified Music Therapist  Licensed Professional Counselor

## 2024-08-07 NOTE — BH NOTE
LOYD Note: The above pt was escorted off the unit with staff accompanied at this time for a family visit. He was left with  at this time.

## 2024-08-07 NOTE — BH NOTE
Pt appeared to have slept for 6.75 hours thus far. Will continue to monitor for safety and changes in behavior.

## 2024-08-07 NOTE — GROUP NOTE
Art Therapy Group Progress Note    PATIENT SCHEDULED FOR GROUP AT: 1:00 PM     GROUP STOP TIME:  1:45 PM        ATTENDANCE: Moderate (6/13 participants)     TOPIC / FOCUS:  Grid Drawing Sheets    GOALS:  to practice observation and grounding techniques, practice mindfulness, decrease stress and anxiety, increase reality orientation, encourage focus, increase problem solving skills, encourage creativity, promote positive emotional management practices, practice frustration tolerance     Notes:  Pt appears to have some cognitive dysfunction including difficulty focusing and disorganization following directions. Pt's depression may be impacting cognitive function creating \"brain fog\". Pt expressed frustration with grid drawing because they were messing up drawing placement. Pt was provided a second sheet to start again. Pt continued to have difficulty with sustained focus. Pt expressed that they find focus when working on cars.     Status After Intervention:  Unchanged    Participation Level: Interactive    Participation Quality: Appropriate and Sharing      Speech:  normal      Thought Process/Content: Logical, distracted       Affective Functioning: Congruent      Mood: depressed      Level of consciousness:  Alert and Inattentive at times,       Response to Learning: Able to verbalize current knowledge/experience      Endings: None Reported    Modes of Intervention: Education, Support, Socialization, Clarifying, Problem-solving, and Reality-testing      Discipline Responsible: Psychoeducational Specialist      Humza Nixon  Art Therapist, MA, ATR-P  Provisional Registered Art Therapist

## 2024-08-08 PROCEDURE — 6370000000 HC RX 637 (ALT 250 FOR IP): Performed by: PSYCHIATRY & NEUROLOGY

## 2024-08-08 PROCEDURE — 99232 SBSQ HOSP IP/OBS MODERATE 35: CPT | Performed by: PSYCHIATRY & NEUROLOGY

## 2024-08-08 PROCEDURE — 1240000000 HC EMOTIONAL WELLNESS R&B

## 2024-08-08 RX ORDER — QUETIAPINE FUMARATE 25 MG/1
25 TABLET, FILM COATED ORAL NIGHTLY
Status: DISCONTINUED | OUTPATIENT
Start: 2024-08-08 | End: 2024-08-13

## 2024-08-08 RX ADMIN — ATORVASTATIN CALCIUM 20 MG: 20 TABLET, FILM COATED ORAL at 20:44

## 2024-08-08 RX ADMIN — DULOXETINE HYDROCHLORIDE 60 MG: 30 CAPSULE, DELAYED RELEASE ORAL at 08:04

## 2024-08-08 RX ADMIN — LORAZEPAM 1 MG: 1 TABLET ORAL at 14:03

## 2024-08-08 RX ADMIN — Medication 2000 UNITS: at 08:04

## 2024-08-08 RX ADMIN — LAMOTRIGINE 150 MG: 100 TABLET ORAL at 20:44

## 2024-08-08 RX ADMIN — DULOXETINE HYDROCHLORIDE 60 MG: 30 CAPSULE, DELAYED RELEASE ORAL at 20:44

## 2024-08-08 RX ADMIN — AMITRIPTYLINE HYDROCHLORIDE 100 MG: 50 TABLET, FILM COATED ORAL at 20:44

## 2024-08-08 RX ADMIN — LORAZEPAM 1 MG: 1 TABLET ORAL at 08:04

## 2024-08-08 RX ADMIN — LORAZEPAM 1 MG: 1 TABLET ORAL at 20:44

## 2024-08-08 RX ADMIN — LAMOTRIGINE 150 MG: 100 TABLET ORAL at 08:03

## 2024-08-08 RX ADMIN — ASPIRIN 81 MG: 81 TABLET, COATED ORAL at 08:04

## 2024-08-08 RX ADMIN — QUETIAPINE FUMARATE 25 MG: 25 TABLET ORAL at 20:44

## 2024-08-08 NOTE — GROUP NOTE
Group Therapy Note    Date: 8/8/2024    Group Start Time: 1445  Group End Time: 1530  Group Topic: Recreational    [facility]    Kirsten Clark        Group Therapy Note    Attendees: 7/13    Group Topic:Journaling  Group Focus: This recreational group engaged patients in a group that focused on journaling. Patients were given a variety of prompts to help them create a journal entry and then discussed the prompt they selected. Patients discussed how journaling is useful and the benefits of journaling. This group may help emotion regulation, enhance mood, self-awareness, creativity, and  decrease stress, anxiety, and negative thoughts.             Notes: At the start of group patient reported feeling \"okay\". Patient engaged in group through active listening and journal prompt writing. Patient declined to share journal topic and didn't engage in group discussions.     Status After Intervention:  Unchanged    Participation Level: Active Listener and Interactive    Participation Quality: Appropriate and Attentive      Speech:  normal      Thought Process/Content: Logical      Affective Functioning: Congruent      Mood: euthymic      Level of consciousness:  Alert and Attentive      Response to Learning: Able to verbalize current knowledge/experience      Endings: None Reported    Modes of Intervention: Support, Socialization, Exploration, and Activity       Recreational Therapist  KIRSTEN CLARK

## 2024-08-08 NOTE — GROUP NOTE
Group Therapy Note    Date: 8/8/2024    Group Start Time: 1300  Group End Time: 1400  Group Topic: Music Therapy      Toni Burger        Group Therapy Note    Attendees: 7/12    Group Focus: Music therapy group consisted of psychoeducation on the creation of mood regulation playlists. The group created a playlist moving from the group's undesired mood state of angry to their desired mood state of calm. Group members assigned a Likert scale of 1-5 for the emotional content of the music, lyrics, and overall song to then order the playlist. The group may promote use of music as a tool to help regulate mood.         Notes:  Pt did not attend group despite encouragement, reporting that he was feeling \"antsy.\"      Discipline Responsible: /Counselor      Signature:  LUCIAN Dickson, LPC  Board-Certified Music Therapist  Licensed Professional Counselor

## 2024-08-09 PROCEDURE — 1240000000 HC EMOTIONAL WELLNESS R&B

## 2024-08-09 PROCEDURE — 6370000000 HC RX 637 (ALT 250 FOR IP): Performed by: PSYCHIATRY & NEUROLOGY

## 2024-08-09 PROCEDURE — 99232 SBSQ HOSP IP/OBS MODERATE 35: CPT | Performed by: PSYCHIATRY & NEUROLOGY

## 2024-08-09 RX ORDER — CLONAZEPAM 0.5 MG/1
1 TABLET ORAL 3 TIMES DAILY
Status: DISCONTINUED | OUTPATIENT
Start: 2024-08-09 | End: 2024-09-30 | Stop reason: HOSPADM

## 2024-08-09 RX ADMIN — ASPIRIN 81 MG: 81 TABLET, COATED ORAL at 07:48

## 2024-08-09 RX ADMIN — AMITRIPTYLINE HYDROCHLORIDE 100 MG: 50 TABLET, FILM COATED ORAL at 20:02

## 2024-08-09 RX ADMIN — Medication 2000 UNITS: at 07:48

## 2024-08-09 RX ADMIN — TRAZODONE HYDROCHLORIDE 50 MG: 50 TABLET ORAL at 20:03

## 2024-08-09 RX ADMIN — LORAZEPAM 1 MG: 1 TABLET ORAL at 14:17

## 2024-08-09 RX ADMIN — QUETIAPINE FUMARATE 25 MG: 25 TABLET ORAL at 20:03

## 2024-08-09 RX ADMIN — ATORVASTATIN CALCIUM 20 MG: 20 TABLET, FILM COATED ORAL at 20:04

## 2024-08-09 RX ADMIN — LORAZEPAM 1 MG: 1 TABLET ORAL at 07:48

## 2024-08-09 RX ADMIN — LAMOTRIGINE 150 MG: 100 TABLET ORAL at 20:03

## 2024-08-09 RX ADMIN — DULOXETINE HYDROCHLORIDE 60 MG: 30 CAPSULE, DELAYED RELEASE ORAL at 20:04

## 2024-08-09 RX ADMIN — CLONAZEPAM 1 MG: 1 TABLET ORAL at 20:04

## 2024-08-09 RX ADMIN — LAMOTRIGINE 150 MG: 100 TABLET ORAL at 07:48

## 2024-08-09 RX ADMIN — DULOXETINE HYDROCHLORIDE 60 MG: 30 CAPSULE, DELAYED RELEASE ORAL at 07:49

## 2024-08-09 RX ADMIN — CLONAZEPAM 1 MG: 1 TABLET ORAL at 15:57

## 2024-08-09 ASSESSMENT — PAIN DESCRIPTION - ONSET: ONSET: GRADUAL

## 2024-08-09 ASSESSMENT — PAIN DESCRIPTION - LOCATION: LOCATION: GENERALIZED

## 2024-08-09 ASSESSMENT — PAIN DESCRIPTION - DIRECTION: RADIATING_TOWARDS: ALL OVER

## 2024-08-09 ASSESSMENT — PAIN DESCRIPTION - ORIENTATION: ORIENTATION: MID

## 2024-08-09 ASSESSMENT — PAIN DESCRIPTION - PAIN TYPE: TYPE: CHRONIC PAIN

## 2024-08-09 ASSESSMENT — PAIN SCALES - GENERAL
PAINLEVEL_OUTOF10: 4
PAINLEVEL_OUTOF10: 0

## 2024-08-09 ASSESSMENT — PAIN DESCRIPTION - FREQUENCY: FREQUENCY: INTERMITTENT

## 2024-08-09 ASSESSMENT — PAIN DESCRIPTION - DESCRIPTORS: DESCRIPTORS: ACHING

## 2024-08-09 ASSESSMENT — PAIN - FUNCTIONAL ASSESSMENT: PAIN_FUNCTIONAL_ASSESSMENT: ACTIVITIES ARE NOT PREVENTED

## 2024-08-09 NOTE — GROUP NOTE
Art Therapy Group Progress Note    PATIENT SCHEDULED FOR GROUP AT: 9:30 AM     GROUP STOP TIME:  10:15 AM        ATTENDANCE: Moderate (7/14 participants)     TOPIC / FOCUS: Mindfulness - Motivational Word Mandala     GOALS: define mindfulness, identify current feelings, encourage healthy emotional management, practicing positive coping skills, encourage focus, promote self-awareness, reduce stress and anxiety       Notes:  Pt chose hope as their word of the day. Pt shared that they chose an image that resembled a flower opening up. Pt stated that they wanted to work on their mental health so they to could blossom. Pt expressed that they felt hope that they could make changes to feel better. Pt express some feelings of anxiety.    Status After Intervention:  Improved    Participation Level: Active Listener and Interactive    Participation Quality: Appropriate, Attentive, and Sharing      Speech:  normal      Thought Process/Content: Logical      Affective Functioning: Congruent      Mood: anxious      Level of consciousness:  Alert and Attentive      Response to Learning: Able to verbalize current knowledge/experience and Able to verbalize/acknowledge new learning      Endings: None Reported    Modes of Intervention: Education, Socialization, and Exploration      Discipline Responsible: Psychoeducational Specialist      Humza Nixon  Art Therapist, MA, ATR-P  Provisional Registered Art Therapist

## 2024-08-09 NOTE — GROUP NOTE
Group Therapy Note    Date: 8/9/2024    Group Start Time: 1500  Group End Time: 1545  Group Topic: Recreational    [facility]    Kirsten Clark        Group Therapy Note    Attendees: 7/12       Group Type: Jeopardy    Group Focus: Recreational therapy group engaged patients through a group game. RT placed patients into two teams to encourage team building. RT used topics that focused on self-care, coping skills, and identifying emotions. The group can assist in increasing positive mood, self-care, social and problem-solving skills, and reduce stress.          Notes: At the start of group patient reported feeling \"nervous\" and was unable to explain why. The patient was engaged, shared, and socialized with peers. During discussions, patient responses centered around issues with his children. Patient discussed wanting to create healthy boundaries with children. Patient expressed being \"proud\" to have paid off debt. When discussing coping skills, pt expressed interest in taking walks.     Status After Intervention:  Unchanged    Participation Level: Active Listener and Interactive    Participation Quality: Appropriate, Attentive, and Sharing      Speech:  normal      Thought Process/Content: Logical      Affective Functioning: Congruent      Mood: euthymic      Level of consciousness:  Alert and Attentive      Response to Learning: Able to verbalize current knowledge/experience      Endings: None Reported    Modes of Intervention: Education, Socialization, Problem-solving, and Activity      Recreational Therapist  KIRSTEN CLARK

## 2024-08-09 NOTE — GROUP NOTE
Group Therapy Note    Date: 8/9/2024    Group Start Time: 1300  Group End Time: 1355  Group Topic: Music Therapy      Toni Burger        Group Therapy Note    Attendees: 6/13    Group Focus: Music therapy group consisted of group members identifying and sharing personally meaningful songs in various categories related to their lives and in coping. Categories included identifying songs that remind them of someone important or an important event, songs that inspire them, songs that help them feel understood, songs to help calm down, and songs or lyrics to help accept oneself or their situation. Group members shared songs from their lists, and space was provided for verbal processing. The group may promote use of music as a coping skill.       Notes:  Pt reported feeling \"antsy\" at the start of group. Pt demonstrated active listening in group. Pt shared a song that he stated inspires him and helps him to calm down.     Status After Intervention:  Unchanged    Participation Level: Active Listener and Interactive    Participation Quality: Appropriate      Speech:  normal      Thought Process/Content: Logical      Affective Functioning: Congruent      Mood: anxious      Level of consciousness:  Alert and Attentive      Response to Learning: Able to verbalize current knowledge/experience      Endings: None Reported    Modes of Intervention: Support, Socialization, Exploration, and Media      Discipline Responsible: /Counselor      Signature:  LUCIAN Dickson, LPC  Board-Certified Music Therapist  Licensed Professional Counselor

## 2024-08-10 PROCEDURE — 6370000000 HC RX 637 (ALT 250 FOR IP): Performed by: PSYCHIATRY & NEUROLOGY

## 2024-08-10 PROCEDURE — 1240000000 HC EMOTIONAL WELLNESS R&B

## 2024-08-10 PROCEDURE — 99231 SBSQ HOSP IP/OBS SF/LOW 25: CPT | Performed by: PSYCHIATRY & NEUROLOGY

## 2024-08-10 RX ADMIN — CLONAZEPAM 1 MG: 1 TABLET ORAL at 08:49

## 2024-08-10 RX ADMIN — DULOXETINE HYDROCHLORIDE 60 MG: 30 CAPSULE, DELAYED RELEASE ORAL at 08:49

## 2024-08-10 RX ADMIN — Medication 2000 UNITS: at 08:49

## 2024-08-10 RX ADMIN — QUETIAPINE FUMARATE 25 MG: 25 TABLET ORAL at 20:10

## 2024-08-10 RX ADMIN — ATORVASTATIN CALCIUM 20 MG: 20 TABLET, FILM COATED ORAL at 20:09

## 2024-08-10 RX ADMIN — CLONAZEPAM 1 MG: 1 TABLET ORAL at 21:00

## 2024-08-10 RX ADMIN — DULOXETINE HYDROCHLORIDE 60 MG: 30 CAPSULE, DELAYED RELEASE ORAL at 20:10

## 2024-08-10 RX ADMIN — LAMOTRIGINE 150 MG: 100 TABLET ORAL at 08:48

## 2024-08-10 RX ADMIN — ASPIRIN 81 MG: 81 TABLET, COATED ORAL at 08:49

## 2024-08-10 RX ADMIN — LAMOTRIGINE 150 MG: 100 TABLET ORAL at 21:00

## 2024-08-10 RX ADMIN — AMITRIPTYLINE HYDROCHLORIDE 100 MG: 50 TABLET, FILM COATED ORAL at 20:08

## 2024-08-10 RX ADMIN — CLONAZEPAM 1 MG: 1 TABLET ORAL at 15:38

## 2024-08-10 ASSESSMENT — PAIN SCALES - GENERAL: PAINLEVEL_OUTOF10: 0

## 2024-08-10 NOTE — GROUP NOTE
Art Therapy Group Progress Note    PATIENT SCHEDULED FOR GROUP AT: 12:00 PM    GROUP STOP TIME:  12:45 PM    ATTENDANCE: Low (4/12 participants)     TOPIC / FOCUS: Watercolor Resist     GOALS:  identify areas of resistance in life, increase problem solving, increase creativity, stimulate focus, promote flexibility and reframing, reduce feelings of stress and anxiety, encourage relaxation      Notes:  Pt mood was brightened. Pt appeared to be nervous about working with watercolors. PT was encouraged to explore the properties of watercolor and resist making form. Pt focused on creating a rainbow. Pt expressed that they enjoyed art making, but they had discovered they really enjoyed coloring sheets because it helped with their anxiety.     Status After Intervention:  Improved    Participation Level: Active Listener and Interactive    Participation Quality: Appropriate      Speech:  normal      Thought Process/Content: Logical      Affective Functioning: Congruent      Mood: euthymic      Level of consciousness:  Alert and Attentive      Response to Learning: Able to verbalize current knowledge/experience      Endings: None Reported    Modes of Intervention: Education, Support, Socialization, Exploration, Problem-solving, and Activity      Discipline Responsible: Psychoeducational Specialist      Humza Nixon  Art Therapist, MA, ATR-P  Provisional Registered Art Therapist

## 2024-08-11 PROCEDURE — 6370000000 HC RX 637 (ALT 250 FOR IP): Performed by: PSYCHIATRY & NEUROLOGY

## 2024-08-11 PROCEDURE — 99231 SBSQ HOSP IP/OBS SF/LOW 25: CPT | Performed by: PSYCHIATRY & NEUROLOGY

## 2024-08-11 PROCEDURE — 1240000000 HC EMOTIONAL WELLNESS R&B

## 2024-08-11 RX ADMIN — LAMOTRIGINE 150 MG: 100 TABLET ORAL at 21:00

## 2024-08-11 RX ADMIN — CLONAZEPAM 1 MG: 1 TABLET ORAL at 07:47

## 2024-08-11 RX ADMIN — AMITRIPTYLINE HYDROCHLORIDE 100 MG: 50 TABLET, FILM COATED ORAL at 20:09

## 2024-08-11 RX ADMIN — CLONAZEPAM 1 MG: 1 TABLET ORAL at 14:03

## 2024-08-11 RX ADMIN — DULOXETINE HYDROCHLORIDE 60 MG: 30 CAPSULE, DELAYED RELEASE ORAL at 07:46

## 2024-08-11 RX ADMIN — QUETIAPINE FUMARATE 25 MG: 25 TABLET ORAL at 20:09

## 2024-08-11 RX ADMIN — DULOXETINE HYDROCHLORIDE 60 MG: 30 CAPSULE, DELAYED RELEASE ORAL at 20:09

## 2024-08-11 RX ADMIN — LAMOTRIGINE 150 MG: 100 TABLET ORAL at 07:47

## 2024-08-11 RX ADMIN — CLONAZEPAM 1 MG: 1 TABLET ORAL at 21:00

## 2024-08-11 RX ADMIN — ATORVASTATIN CALCIUM 20 MG: 20 TABLET, FILM COATED ORAL at 20:09

## 2024-08-11 RX ADMIN — ASPIRIN 81 MG: 81 TABLET, COATED ORAL at 07:47

## 2024-08-11 RX ADMIN — Medication 2000 UNITS: at 07:47

## 2024-08-11 ASSESSMENT — PAIN SCALES - GENERAL
PAINLEVEL_OUTOF10: 0
PAINLEVEL_OUTOF10: 0

## 2024-08-12 PROCEDURE — 99239 HOSP IP/OBS DSCHRG MGMT >30: CPT | Performed by: PSYCHIATRY & NEUROLOGY

## 2024-08-12 PROCEDURE — 6370000000 HC RX 637 (ALT 250 FOR IP): Performed by: PSYCHIATRY & NEUROLOGY

## 2024-08-12 PROCEDURE — 1240000000 HC EMOTIONAL WELLNESS R&B

## 2024-08-12 RX ORDER — LAMOTRIGINE 150 MG/1
150 TABLET ORAL 2 TIMES DAILY
Qty: 50 TABLET | Refills: 0 | Status: SHIPPED | OUTPATIENT
Start: 2024-08-12 | End: 2024-09-30

## 2024-08-12 RX ORDER — COLCHICINE 0.6 MG/1
0.6 CAPSULE ORAL 2 TIMES DAILY PRN
Qty: 1 CAPSULE | Refills: 0
Start: 2024-08-12

## 2024-08-12 RX ORDER — AMITRIPTYLINE HYDROCHLORIDE 100 MG/1
100 TABLET ORAL NIGHTLY
Qty: 30 TABLET | Refills: 0 | Status: SHIPPED | OUTPATIENT
Start: 2024-08-12 | End: 2024-09-30

## 2024-08-12 RX ORDER — QUETIAPINE FUMARATE 25 MG/1
25 TABLET, FILM COATED ORAL NIGHTLY
Qty: 30 TABLET | Refills: 1 | Status: SHIPPED | OUTPATIENT
Start: 2024-08-12 | End: 2024-08-14 | Stop reason: HOSPADM

## 2024-08-12 RX ORDER — ASPIRIN 81 MG/1
81 TABLET ORAL DAILY
Qty: 30 TABLET | Refills: 3 | Status: SHIPPED | OUTPATIENT
Start: 2024-08-13

## 2024-08-12 RX ORDER — DULOXETIN HYDROCHLORIDE 60 MG/1
60 CAPSULE, DELAYED RELEASE ORAL 2 TIMES DAILY
Qty: 60 CAPSULE | Refills: 0 | Status: SHIPPED | OUTPATIENT
Start: 2024-08-12 | End: 2024-09-30

## 2024-08-12 RX ORDER — ATORVASTATIN CALCIUM 20 MG/1
20 TABLET, FILM COATED ORAL NIGHTLY
Qty: 30 TABLET | Refills: 3 | Status: SHIPPED | OUTPATIENT
Start: 2024-08-12

## 2024-08-12 RX ORDER — CLONAZEPAM 1 MG/1
1 TABLET ORAL 3 TIMES DAILY
Qty: 90 TABLET | Refills: 1 | Status: SHIPPED | OUTPATIENT
Start: 2024-08-12 | End: 2024-09-30

## 2024-08-12 RX ADMIN — ATORVASTATIN CALCIUM 20 MG: 20 TABLET, FILM COATED ORAL at 20:07

## 2024-08-12 RX ADMIN — CLONAZEPAM 1 MG: 1 TABLET ORAL at 20:06

## 2024-08-12 RX ADMIN — DULOXETINE HYDROCHLORIDE 60 MG: 30 CAPSULE, DELAYED RELEASE ORAL at 20:06

## 2024-08-12 RX ADMIN — AMITRIPTYLINE HYDROCHLORIDE 100 MG: 50 TABLET, FILM COATED ORAL at 20:05

## 2024-08-12 RX ADMIN — DULOXETINE HYDROCHLORIDE 60 MG: 30 CAPSULE, DELAYED RELEASE ORAL at 08:10

## 2024-08-12 RX ADMIN — LAMOTRIGINE 150 MG: 100 TABLET ORAL at 20:05

## 2024-08-12 RX ADMIN — CLONAZEPAM 1 MG: 1 TABLET ORAL at 08:11

## 2024-08-12 RX ADMIN — ASPIRIN 81 MG: 81 TABLET, COATED ORAL at 08:11

## 2024-08-12 RX ADMIN — CLONAZEPAM 1 MG: 1 TABLET ORAL at 14:02

## 2024-08-12 RX ADMIN — LAMOTRIGINE 150 MG: 100 TABLET ORAL at 08:10

## 2024-08-12 RX ADMIN — QUETIAPINE FUMARATE 25 MG: 25 TABLET ORAL at 20:06

## 2024-08-12 RX ADMIN — HYDROXYZINE HYDROCHLORIDE 50 MG: 50 TABLET, FILM COATED ORAL at 20:06

## 2024-08-12 RX ADMIN — Medication 2000 UNITS: at 08:06

## 2024-08-12 ASSESSMENT — PAIN SCALES - GENERAL: PAINLEVEL_OUTOF10: 0

## 2024-08-12 NOTE — GROUP NOTE
Art Therapy Group Progress Note    PATIENT SCHEDULED FOR GROUP AT: 1:00 PM    GROUP STOP TIME:  1:45 PM    ATTENDANCE: High (8/10 participants)     TOPIC / FOCUS: Coping Shield     GOALS:  increase self-awareness, promote effective coping skills, increase feelings of autonomy, encourage creative problem solving and building resilience, increase focus, encourage creativity, promote positive coping skills    PARTICIPATION LEVEL:  Pt did not attend.     Humza Nixon  Art Therapist, MA, ATR-P  Provisional Registered Art Therapist      Pt declined to participate in group due to increased anxiety.

## 2024-08-12 NOTE — GROUP NOTE
Group Therapy Note    Date: 8/12/2024    Group Start Time: 1500  Group End Time: 1545  Group Topic: Music Therapy      Toni Burger        Group Therapy Note    Attendees: 8/9    Group Focus: Music therapy group explored values and wisdom or life advice through song marimar analysis, a songwriting technique, and an ACT values handout. Group members identified values and life advice which were then sung back with a live rendition of the song to support the group process. The group may promote self-awareness, self-reflection, identification of values, and use of music as a coping skill.        Notes:  Pt did not attend group due to sleeping.      Discipline Responsible: /Counselor      Signature:  LUCIAN Dickson, LPC  Board-Certified Music Therapist  Licensed Professional Counselor

## 2024-08-12 NOTE — GROUP NOTE
Group Therapy Note    Date: 8/12/2024    Group Start Time: 0930  Group End Time: 1020  Group Topic: Recovery    [facility]    Kirsten Clark        Group Therapy Note    Attendees: 4/13    Group Type: Anxiety Thumball    Group Focus: Patients used thumball to learn coping skills for anxiety, while increasing social skills. Group members were able to answer questions, listen and communicate with peers on handling anxious feelings. This group may help enhance social skills, coping skills, mood, and decrease stress and anxiety.              Notes: Patient actively engaged and shared in group. Patient expressed his worries come from his children being a finical burden. Patient discussed what anxiety feels like for him. Patient shared shuts down he his anxiety increases.    Status After Intervention:  Unchanged    Participation Level: Active Listener and Interactive    Participation Quality: Appropriate, Attentive, and Sharing      Speech:  normal      Thought Process/Content: Logical      Affective Functioning: Congruent      Mood: euthymic      Level of consciousness:  Alert and Attentive      Response to Learning: Able to verbalize current knowledge/experience      Endings: None Reported    Modes of Intervention: Support, Socialization, and Activity      Recreational Therapist  KIRSTEN CLARK

## 2024-08-13 PROCEDURE — 6370000000 HC RX 637 (ALT 250 FOR IP): Performed by: PSYCHIATRY & NEUROLOGY

## 2024-08-13 PROCEDURE — 1240000000 HC EMOTIONAL WELLNESS R&B

## 2024-08-13 PROCEDURE — 99233 SBSQ HOSP IP/OBS HIGH 50: CPT | Performed by: PSYCHIATRY & NEUROLOGY

## 2024-08-13 RX ORDER — QUETIAPINE FUMARATE 25 MG/1
50 TABLET, FILM COATED ORAL NIGHTLY
Status: DISCONTINUED | OUTPATIENT
Start: 2024-08-13 | End: 2024-08-16

## 2024-08-13 RX ADMIN — Medication 2000 UNITS: at 09:04

## 2024-08-13 RX ADMIN — AMITRIPTYLINE HYDROCHLORIDE 100 MG: 50 TABLET, FILM COATED ORAL at 20:05

## 2024-08-13 RX ADMIN — ATORVASTATIN CALCIUM 20 MG: 20 TABLET, FILM COATED ORAL at 20:06

## 2024-08-13 RX ADMIN — CLONAZEPAM 1 MG: 1 TABLET ORAL at 14:51

## 2024-08-13 RX ADMIN — QUETIAPINE FUMARATE 50 MG: 25 TABLET ORAL at 20:06

## 2024-08-13 RX ADMIN — ASPIRIN 81 MG: 81 TABLET, COATED ORAL at 09:04

## 2024-08-13 RX ADMIN — HYDROXYZINE HYDROCHLORIDE 50 MG: 50 TABLET, FILM COATED ORAL at 20:05

## 2024-08-13 RX ADMIN — LAMOTRIGINE 150 MG: 100 TABLET ORAL at 09:02

## 2024-08-13 RX ADMIN — CLONAZEPAM 1 MG: 1 TABLET ORAL at 20:06

## 2024-08-13 RX ADMIN — DULOXETINE HYDROCHLORIDE 60 MG: 30 CAPSULE, DELAYED RELEASE ORAL at 20:05

## 2024-08-13 RX ADMIN — LAMOTRIGINE 150 MG: 100 TABLET ORAL at 20:04

## 2024-08-13 RX ADMIN — CLONAZEPAM 1 MG: 1 TABLET ORAL at 09:04

## 2024-08-13 RX ADMIN — DULOXETINE HYDROCHLORIDE 60 MG: 30 CAPSULE, DELAYED RELEASE ORAL at 09:03

## 2024-08-13 ASSESSMENT — PAIN SCALES - GENERAL
PAINLEVEL_OUTOF10: 0

## 2024-08-13 NOTE — GROUP NOTE
Group Therapy Note    Date: 8/13/2024    Group Start Time: 1500  Group End Time: 1545  Group Topic: Recreational    [facility]    Jolly Clark        Group Therapy Note    Attendees: 4/7    Group Type: Game/Trouble      Recreational therapy group engaged patients through a table game and listening to requested songs from patients. Group members also engaged in conversation using thoughts and feelings cards to identify, process, and work through various issues, including changes within the family, trauma, grief, anger, depression, anxiety and fears. This group may help boost serotonin, relieve symptoms of anxiety, stress, depression, and increase leisure education.          Notes: Patient did not attend group.     Recreational Therapist   Jolly Clark

## 2024-08-13 NOTE — GROUP NOTE
Group Therapy Note    Date: 8/13/2024    Group Start Time: 0930  Group End Time: 1030  Group Topic: Music Therapy      Toni Burger        Group Therapy Note    Attendees: 3/9    Group Focus: Music therapy group consisted of black-out poetry utilizing song lyrics. Group members utilized a choice of songs to create their own poems from the existing lyrics. The group may promote creativity, self-expression, and use of music and creative arts as coping skills.       Notes:  Pt was sitting in the day area when invited to group and declined to attend group.      Discipline Responsible: /Counselor      Signature:  LUCIAN Dickson, LPC  Board-Certified Music Therapist  Licensed Professional Counselor

## 2024-08-13 NOTE — GROUP NOTE
Art Therapy Group Progress Note    PATIENT SCHEDULED FOR GROUP AT: 1:00 PM    GROUP STOP TIME:  1:45 PM    ATTENDANCE: High (8/8 participants)     TOPIC / FOCUS: Drawing Structured Patterns     GOALS:  promote concentration, increase creativity, increase self-esteem, practice positive coping skills, disrupt negative thinking, increase personal well-being, encourage mindfulness practices, increase feelings of autonomy    Notes:  Pt started off with \"I can't draw\". The pt was encouraged to participate noting that they are capable of making a allison on paper. Pt appeared to be focused on things being done correctly. Pt was reminded that they goal is creativity, that there are no expectations of the results. Pt continues to present with negative self-talk, negative thinking, and decision paralysis. Pt was pulled from group for visitation.     Status After Intervention:  Unchanged    Participation Level: Minimal    Participation Quality: Appropriate and Resistant      Speech:  normal      Thought Process/Content: Logical  Perseverating on \"inability\" and negative thoughts       Affective Functioning: Congruent      Mood: anxious and dysphoric      Level of consciousness:  Alert and Preoccupied      Response to Learning: Able to verbalize current knowledge/experience and Resistant      Endings: None Reported    Modes of Intervention: Education, Exploration, and Activity      Discipline Responsible: Psychoeducational Specialist    Humza Nixon  Art Therapist, MA, ATR-P  Provisional Registered Art Therapist

## 2024-08-14 PROCEDURE — 1240000000 HC EMOTIONAL WELLNESS R&B

## 2024-08-14 PROCEDURE — 99238 HOSP IP/OBS DSCHRG MGMT 30/<: CPT | Performed by: PSYCHIATRY & NEUROLOGY

## 2024-08-14 PROCEDURE — 6370000000 HC RX 637 (ALT 250 FOR IP)

## 2024-08-14 PROCEDURE — 6370000000 HC RX 637 (ALT 250 FOR IP): Performed by: PSYCHIATRY & NEUROLOGY

## 2024-08-14 RX ORDER — QUETIAPINE FUMARATE 50 MG/1
50 TABLET, FILM COATED ORAL NIGHTLY
Qty: 30 TABLET | Refills: 0 | Status: SHIPPED | OUTPATIENT
Start: 2024-08-14 | End: 2024-09-30

## 2024-08-14 RX ADMIN — HYDROXYZINE HYDROCHLORIDE 50 MG: 50 TABLET, FILM COATED ORAL at 19:26

## 2024-08-14 RX ADMIN — CLONAZEPAM 1 MG: 1 TABLET ORAL at 13:58

## 2024-08-14 RX ADMIN — ASPIRIN 81 MG: 81 TABLET, COATED ORAL at 08:32

## 2024-08-14 RX ADMIN — DULOXETINE HYDROCHLORIDE 60 MG: 30 CAPSULE, DELAYED RELEASE ORAL at 20:07

## 2024-08-14 RX ADMIN — CLONAZEPAM 1 MG: 1 TABLET ORAL at 08:33

## 2024-08-14 RX ADMIN — LAMOTRIGINE 150 MG: 100 TABLET ORAL at 08:33

## 2024-08-14 RX ADMIN — CLONAZEPAM 1 MG: 1 TABLET ORAL at 21:00

## 2024-08-14 RX ADMIN — Medication 2000 UNITS: at 08:32

## 2024-08-14 RX ADMIN — DULOXETINE HYDROCHLORIDE 60 MG: 30 CAPSULE, DELAYED RELEASE ORAL at 08:32

## 2024-08-14 RX ADMIN — QUETIAPINE FUMARATE 50 MG: 25 TABLET ORAL at 20:08

## 2024-08-14 RX ADMIN — LAMOTRIGINE 150 MG: 100 TABLET ORAL at 21:00

## 2024-08-14 RX ADMIN — COLCHICINE 0.6 MG: 0.6 CAPSULE ORAL at 06:09

## 2024-08-14 RX ADMIN — AMITRIPTYLINE HYDROCHLORIDE 100 MG: 50 TABLET, FILM COATED ORAL at 20:06

## 2024-08-14 RX ADMIN — ATORVASTATIN CALCIUM 20 MG: 20 TABLET, FILM COATED ORAL at 20:07

## 2024-08-14 ASSESSMENT — PAIN SCALES - GENERAL: PAINLEVEL_OUTOF10: 0

## 2024-08-14 NOTE — DISCHARGE INSTRUCTIONS
BEHAVIORAL HEALTH NURSING DISCHARGE NOTE      The personal items collected during your admission are returned to you:         PATIENT INSTRUCTIONS:    What to do at Home    You may not operate a vehicle for 24-hours.    You may not engage in an occupation involving machinery or appliances.    You may not drink any alcoholic beverages during the next 48-hours.    You may not resume normal activities.    Avoid making any critical decisions for at least 24-hours.    Recommended diet: regular diet    Recommended activity: activity as tolerated    You are referred to [facility]    Follow-up with [facility] in 1 day. If you have problems relating to your recovery, call your physician.    The discharge information has been reviewed with the patient.  The patient verbalized understanding.

## 2024-08-14 NOTE — GROUP NOTE
Group Therapy Note    Date: 8/14/2024    Group Start Time: 0930  Group End Time: 1015  Group Topic: Recreational    [facility]    Eduardo Jolly        Group Therapy Note    Attendees: 5/11    Group type: Exercise      This recreational group engaged patients in physical activity.  Recreational therapists led group members in guided exercises. Patients discussed different physical activities and relaxation techniques they may use or can add to their daily routines. This group may help reduce feelings of depression and stress and enhance mood and over emotional well-being.            Notes:  Patient reported feeling \"better and ready for discharge\" at the start of group. Patient engaged in group through sharing and completing exercises. Patient shared he enjoys taking his dog walking daily and interested in starting a gym membership with his wife.     Status After Intervention:  Improved    Participation Level: Active Listener and Interactive    Participation Quality: Appropriate, Attentive, and Sharing      Speech:  normal      Thought Process/Content: Logical      Affective Functioning: Congruent      Mood: euthymic      Level of consciousness:  Alert and Attentive      Response to Learning: Able to verbalize current knowledge/experience      Endings: None Reported    Modes of Intervention: Socialization, Movement, and Media    Recreational Therapist  EDUARDO

## 2024-08-14 NOTE — GROUP NOTE
Art Therapy Group Progress Note    PATIENT SCHEDULED FOR GROUP AT: 1:00 PM    GROUP STOP TIME:  1:45 PM    ATTENDANCE: Moderate (4/9 participants)     TOPIC / FOCUS: Draw an Emotion as a Character     GOALS:  Emotion Identification, modeling emotional communication skills, reflect on emotions and reactions to emotions, identify coping skills and provide alternative coping options, encourage self-expression, support creativity, encourage emotional management tools      PARTICIPATION LEVEL:  Pt did not attend.     Humza Nixon  Art Therapist, MA, ATR-P  Provisional Registered Art Therapist

## 2024-08-14 NOTE — GROUP NOTE
Group Therapy Note    Date: 8/14/2024    Group Start Time: 1500  Group End Time: 1545  Group Topic: Recreational    [facility]    Jolly Clark        Group Therapy Note    Attendees: 3/8  Group Type: Connect More   Recreational therapist engaged patients in a meaningful conversation that encourages players to reflect on their opinions, feelings, thoughts and goals. This group may help improve social skills, self-reflection, emotional skills, self-control and self-resilience.        Notes: Patient did not attend group.     Recreational Therapist   Jolly Clark

## 2024-08-14 NOTE — GROUP NOTE
Group Therapy Note    Date: 8/14/2024    Group Start Time: 1400  Group End Time: 1455  Group Topic: Music Therapy      Toni Burger        Group Therapy Note    Attendees: 4/8    Group Focus: Therapist provided choice of music therapy interventions and group members selected collaborative music making. The group consisted of instrument playing and group singing. The group may promote socialization, group cohesion, present-centered focus, and use of music as a healthy outlet for self-expression.       Notes:  Pt presented as highly anxious when invited to group and was working with 1:1 staff member on coping skills. Pt identified breathing and walking as coping skills, and therapist encouraged breathing as a coping skill to utilize anywhere. Pt declined to attend the group. Pt was observed leaving the unit to discharge during the group time and then returned back to the unit in a tearful manner while the group was taking place. Pt was observed receiving staff support upon return.      Discipline Responsible: /Counselor      Signature:  LUCIAN Dickson LPC  Board-Certified Music Therapist  Licensed Professional Counselor

## 2024-08-15 PROCEDURE — 6370000000 HC RX 637 (ALT 250 FOR IP): Performed by: PSYCHIATRY & NEUROLOGY

## 2024-08-15 PROCEDURE — 1240000000 HC EMOTIONAL WELLNESS R&B

## 2024-08-15 RX ADMIN — Medication 2000 UNITS: at 09:08

## 2024-08-15 RX ADMIN — ACETAMINOPHEN 325MG 650 MG: 325 TABLET ORAL at 00:18

## 2024-08-15 RX ADMIN — CLONAZEPAM 1 MG: 1 TABLET ORAL at 09:08

## 2024-08-15 RX ADMIN — ASPIRIN 81 MG: 81 TABLET, COATED ORAL at 09:08

## 2024-08-15 RX ADMIN — LAMOTRIGINE 150 MG: 100 TABLET ORAL at 21:00

## 2024-08-15 RX ADMIN — ATORVASTATIN CALCIUM 20 MG: 20 TABLET, FILM COATED ORAL at 21:00

## 2024-08-15 RX ADMIN — DULOXETINE HYDROCHLORIDE 60 MG: 30 CAPSULE, DELAYED RELEASE ORAL at 09:08

## 2024-08-15 RX ADMIN — DULOXETINE HYDROCHLORIDE 60 MG: 30 CAPSULE, DELAYED RELEASE ORAL at 20:21

## 2024-08-15 RX ADMIN — CLONAZEPAM 1 MG: 1 TABLET ORAL at 21:00

## 2024-08-15 RX ADMIN — CLONAZEPAM 1 MG: 1 TABLET ORAL at 14:31

## 2024-08-15 RX ADMIN — AMITRIPTYLINE HYDROCHLORIDE 100 MG: 50 TABLET, FILM COATED ORAL at 20:21

## 2024-08-15 RX ADMIN — QUETIAPINE FUMARATE 50 MG: 25 TABLET ORAL at 20:22

## 2024-08-15 RX ADMIN — LAMOTRIGINE 150 MG: 100 TABLET ORAL at 09:07

## 2024-08-15 ASSESSMENT — PAIN SCALES - GENERAL: PAINLEVEL_OUTOF10: 8

## 2024-08-15 ASSESSMENT — PAIN DESCRIPTION - LOCATION: LOCATION: TOE (COMMENT WHICH ONE)

## 2024-08-15 ASSESSMENT — PAIN DESCRIPTION - DESCRIPTORS: DESCRIPTORS: ACHING

## 2024-08-15 ASSESSMENT — PAIN DESCRIPTION - ORIENTATION: ORIENTATION: LEFT
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
## 2024-08-15 NOTE — GROUP NOTE
Group Therapy Note    Date: 8/15/2024    Group Start Time: 1530  Group End Time: 1600  Group Topic: Recreational    [facility]    Jolly Clark        Group Therapy Note    Attendees: 6/11    Group Type: Communication Bingo    This recreational therapy group focused on healthy communication and relationships. Patients discussed how to talk about difficult topics, the do's and don'ts when communicating, and active listening. This group may help enhance mood, communication and problem solving skills, decrease stress and anxiety.       Notes: Patient did not attend group.     Recreational Therapist   Jolly Clark

## 2024-08-15 NOTE — GROUP NOTE
Art Therapy Group Progress Note    PATIENT SCHEDULED FOR GROUP AT: 1:00 PM    GROUP STOP TIME:  1:45 PM    ATTENDANCE: Moderate (4/8 participants)     TOPIC / FOCUS: Mental Health Garden      GOALS:  encourage creativity, increase focus, increase self-awareness, identify problem areas in thinking, promote positive coping skills, practice CBT techniques, identify mental health goals, promote mental well-being, encourage problem solving skills      Notes:  Pt participated in art group. Pt was provided a coloring sheet to lessen drawing anxiety. Pt was unable to operate in the metaphor and displayed concrete thinking. Pt shared that they would grow sunflowers. When asked what they represent they responded \"good seeds.\"      Pt image was flowers in a flower box. Pt stated it was a bench and the flowers were leaning over the top of the bench, which would create incongruent perspective with in the image. The Pt also stated that the grass was mountains in the back ground. Pt appeared to need approval/validation for every decision in their coloring process. This writer reminded the Pt that creativity does not have a right or wrong. Pt was engaged in coloring.     Status After Intervention:  Unchanged    Participation Level: Interactive    Participation Quality: Appropriate, Sharing, and Resistant      Speech:  normal      Thought Process/Content: concrete, bizarre       Affective Functioning: Flat      Mood: anxious and dysphoric      Level of consciousness:  Alert      Response to Learning: Able to verbalize current knowledge/experience      Endings: None Reported    Modes of Intervention: Education, Support, Socialization, and Exploration      Discipline Responsible: Psychoeducational Specialist    Humza Nixon  Art Therapist, MA, ATR-P  Provisional Registered Art Therapist

## 2024-08-15 NOTE — GROUP NOTE
Group Therapy Note    Date: 8/15/2024    Group Start Time: 1430  Group End Time: 1500  Group Topic: Psychoeducation      Toni Burger        Group Therapy Note    Attendees: 5/11    Group Focus: The group consisted of psychoeducation on the GLAD Technique as a way of finding lisa and balance in life. Group members each discussed and shared something they are grateful (G) for today, one new thing they have learned (L) recently, one small accomplishment (A), and one delight (D) for today.        Notes:  Pt actively engaged in group. Pt discussed interest in hunting and fishing. Pt discussed gratitude for being able to be here today. Pt processed events leading to hospitalization to include discussing how he said he was going to shoot himself. Pt processed events from yesterday where he left to discharge and returned. Pt discussed how he had never cried the way he cried yesterday upon seeing his wife. Pt then stated he was grateful to see his wife yesterday. Pt discussed something he has learned recently is learning how to cope, and identified and demonstrated breathing and toe tapping as coping skills. Therapist further supported, modeled, and expanded upon his demonstrated toe tapping and breathing technique (his demonstrated breathing technique included in with the nose, out with the mouth) to support patient with this coping skill practice. Pt discussed how a delight for today was listening to a song he liked in group this morning.     Status After Intervention:  Improved    Participation Level: Interactive    Participation Quality: Appropriate, Attentive, and Sharing      Speech:  normal      Thought Process/Content: Logical      Affective Functioning: Congruent      Mood: Calm      Level of consciousness:  Alert and Attentive      Response to Learning: Able to verbalize current knowledge/experience, Able to verbalize/acknowledge new learning,

## 2024-08-16 PROCEDURE — 99232 SBSQ HOSP IP/OBS MODERATE 35: CPT | Performed by: PSYCHIATRY & NEUROLOGY

## 2024-08-16 PROCEDURE — 6370000000 HC RX 637 (ALT 250 FOR IP): Performed by: PSYCHIATRY & NEUROLOGY

## 2024-08-16 PROCEDURE — 6370000000 HC RX 637 (ALT 250 FOR IP)

## 2024-08-16 PROCEDURE — 1240000000 HC EMOTIONAL WELLNESS R&B

## 2024-08-16 RX ORDER — QUETIAPINE FUMARATE 100 MG/1
100 TABLET, FILM COATED ORAL NIGHTLY
Status: DISCONTINUED | OUTPATIENT
Start: 2024-08-16 | End: 2024-08-24

## 2024-08-16 RX ADMIN — ASPIRIN 81 MG: 81 TABLET, COATED ORAL at 08:31

## 2024-08-16 RX ADMIN — AMITRIPTYLINE HYDROCHLORIDE 100 MG: 50 TABLET, FILM COATED ORAL at 20:12

## 2024-08-16 RX ADMIN — DULOXETINE HYDROCHLORIDE 60 MG: 30 CAPSULE, DELAYED RELEASE ORAL at 08:30

## 2024-08-16 RX ADMIN — CLONAZEPAM 1 MG: 1 TABLET ORAL at 20:43

## 2024-08-16 RX ADMIN — LAMOTRIGINE 150 MG: 100 TABLET ORAL at 08:30

## 2024-08-16 RX ADMIN — HYDROXYZINE HYDROCHLORIDE 50 MG: 50 TABLET, FILM COATED ORAL at 13:46

## 2024-08-16 RX ADMIN — QUETIAPINE FUMARATE 100 MG: 100 TABLET ORAL at 20:12

## 2024-08-16 RX ADMIN — LORAZEPAM 1 MG: 1 TABLET ORAL at 19:01

## 2024-08-16 RX ADMIN — Medication 2000 UNITS: at 08:30

## 2024-08-16 RX ADMIN — LAMOTRIGINE 150 MG: 100 TABLET ORAL at 21:00

## 2024-08-16 RX ADMIN — ATORVASTATIN CALCIUM 20 MG: 20 TABLET, FILM COATED ORAL at 20:12

## 2024-08-16 RX ADMIN — COLCHICINE 0.6 MG: 0.6 CAPSULE ORAL at 00:04

## 2024-08-16 RX ADMIN — CLONAZEPAM 1 MG: 1 TABLET ORAL at 14:41

## 2024-08-16 RX ADMIN — DULOXETINE HYDROCHLORIDE 60 MG: 30 CAPSULE, DELAYED RELEASE ORAL at 20:12

## 2024-08-16 RX ADMIN — CLONAZEPAM 1 MG: 1 TABLET ORAL at 08:30

## 2024-08-16 ASSESSMENT — PAIN DESCRIPTION - LOCATION: LOCATION: KNEE;TOE (COMMENT WHICH ONE)

## 2024-08-16 ASSESSMENT — PAIN SCALES - GENERAL
PAINLEVEL_OUTOF10: 0
PAINLEVEL_OUTOF10: 6

## 2024-08-16 ASSESSMENT — PAIN DESCRIPTION - DESCRIPTORS: DESCRIPTORS: ACHING

## 2024-08-16 ASSESSMENT — PAIN DESCRIPTION - ORIENTATION: ORIENTATION: LEFT

## 2024-08-16 NOTE — GROUP NOTE
Group Therapy Note    Date: 8/16/2024    Group Start Time: 1500  Group End Time: 1545  Group Topic: Recreational    [facility]    Jolly Clark        Group Therapy Note    Attendees: 8/13    Group Focus: Recreational therapy group focused on the topic \"What can I do to better myself?\". Patients developed a goal setting flower, where they listed a goal in each petal that would inspire positive change. The group may help promote change and self improvement, increase self-awareness, motivation, and focus, and decrease stress and anxiety.         Notes: Patient did not attend group.     Recreational Therapist   Jolly Clark

## 2024-08-16 NOTE — GROUP NOTE
Group Therapy Note    Date: 8/16/2024    Group Start Time: 1300  Group End Time: 1345  Group Topic: Music Therapy      Toni Buregr        Group Therapy Note    Attendees: 7/13    Group Focus: Music therapy group consisted of a collaborative group songwriting. Through songwriting, the group processed past experiences, feelings leading to hospitalization, and hopes for treatment and recovery. The group closed with adding instrumentation to the group's newly created song to support the group process. The group may promote creativity, self-expression, mood enhancement, group cohesion, and use of music as a coping skill.       Notes:  Pt did not attend group.      Discipline Responsible: /Counselor      Signature:  LUCIAN Dickson, LPC  Board-Certified Music Therapist  Licensed Professional Counselor

## 2024-08-16 NOTE — GROUP NOTE
Art Therapy Group Progress Note    PATIENT SCHEDULED FOR GROUP AT: 9:30 AM     GROUP STOP TIME:  10:15 AM    ATTENDANCE: Moderate (7/14 participants)     TOPIC / FOCUS: Patterned Mandala     GOALS: define mindfulness, practice deep breathing, promote concentration, disrupt negative thinking, increase autonomy, encourage effective coping skills, psychoeducation on flow experiences, promote creativity, reduce feelings of stress and anxiety, increase relaxation     Notes:  Pt continues to display negative thinking, typically starting sentences with \"I can't\" and frequently using \"but\".  This writer continues to encourage the Pt that there is no right or wrong way to be creative. Pt was able to fill in parts of the mandala. Pt stated that it reminded them of waves and the sun. Pt then began to display decision paralysis, asking this writer to select colors for them.This writer encouraged the Pt to think of places they might see the sun and waves, promoting autonomy in color choice. Pt identified the beach and settled on sand.     Pt provided incongruent verbalizations. Pt expressed that coloring gave them anxiety. However, in group discussion the pt said that focusing on coloring had reduced their experience of physical anxiety symptoms. Pt also expressed that the instrumental music was increasing their feelings of calmness.     Pt continues to doubt their capabilities, their color decisions, and display concrete thinking which impacts their task comprehension.     Status After Intervention:  Improved    Participation Level: Interactive    Participation Quality: Appropriate and Resistant      Speech:  normal      Thought Process/Content: concrete, negative       Affective Functioning: Blunted      Mood: anxious and dysphoric      Level of consciousness:  Alert      Response to Learning: Able to verbalize current knowledge/experience and Resistant      Endings: None Reported    Modes of Intervention: Support,

## 2024-08-17 PROCEDURE — 1240000000 HC EMOTIONAL WELLNESS R&B

## 2024-08-17 PROCEDURE — 6370000000 HC RX 637 (ALT 250 FOR IP): Performed by: PSYCHIATRY & NEUROLOGY

## 2024-08-17 PROCEDURE — 6370000000 HC RX 637 (ALT 250 FOR IP)

## 2024-08-17 PROCEDURE — 99232 SBSQ HOSP IP/OBS MODERATE 35: CPT | Performed by: PSYCHIATRY & NEUROLOGY

## 2024-08-17 RX ADMIN — CLONAZEPAM 1 MG: 1 TABLET ORAL at 15:32

## 2024-08-17 RX ADMIN — QUETIAPINE FUMARATE 100 MG: 100 TABLET ORAL at 20:50

## 2024-08-17 RX ADMIN — CLONAZEPAM 1 MG: 1 TABLET ORAL at 20:50

## 2024-08-17 RX ADMIN — ATORVASTATIN CALCIUM 20 MG: 20 TABLET, FILM COATED ORAL at 20:50

## 2024-08-17 RX ADMIN — ASPIRIN 81 MG: 81 TABLET, COATED ORAL at 08:40

## 2024-08-17 RX ADMIN — CLONAZEPAM 1 MG: 1 TABLET ORAL at 08:40

## 2024-08-17 RX ADMIN — AMITRIPTYLINE HYDROCHLORIDE 100 MG: 50 TABLET, FILM COATED ORAL at 20:50

## 2024-08-17 RX ADMIN — DULOXETINE HYDROCHLORIDE 60 MG: 30 CAPSULE, DELAYED RELEASE ORAL at 20:50

## 2024-08-17 RX ADMIN — Medication 2000 UNITS: at 08:40

## 2024-08-17 RX ADMIN — LAMOTRIGINE 150 MG: 100 TABLET ORAL at 08:40

## 2024-08-17 RX ADMIN — LAMOTRIGINE 150 MG: 100 TABLET ORAL at 20:50

## 2024-08-17 RX ADMIN — DULOXETINE HYDROCHLORIDE 60 MG: 30 CAPSULE, DELAYED RELEASE ORAL at 08:40

## 2024-08-17 RX ADMIN — COLCHICINE 0.6 MG: 0.6 CAPSULE ORAL at 10:31

## 2024-08-17 ASSESSMENT — PAIN SCALES - GENERAL
PAINLEVEL_OUTOF10: 1
PAINLEVEL_OUTOF10: 7
PAINLEVEL_OUTOF10: 0

## 2024-08-17 ASSESSMENT — PAIN - FUNCTIONAL ASSESSMENT: PAIN_FUNCTIONAL_ASSESSMENT: ACTIVITIES ARE NOT PREVENTED

## 2024-08-17 ASSESSMENT — PAIN DESCRIPTION - LOCATION: LOCATION: LEG

## 2024-08-17 ASSESSMENT — PAIN DESCRIPTION - DESCRIPTORS: DESCRIPTORS: ACHING

## 2024-08-17 ASSESSMENT — PAIN DESCRIPTION - ORIENTATION: ORIENTATION: RIGHT

## 2024-08-18 PROCEDURE — 99231 SBSQ HOSP IP/OBS SF/LOW 25: CPT | Performed by: PSYCHIATRY & NEUROLOGY

## 2024-08-18 PROCEDURE — 6370000000 HC RX 637 (ALT 250 FOR IP): Performed by: PSYCHIATRY & NEUROLOGY

## 2024-08-18 PROCEDURE — 6370000000 HC RX 637 (ALT 250 FOR IP)

## 2024-08-18 PROCEDURE — 1240000000 HC EMOTIONAL WELLNESS R&B

## 2024-08-18 RX ADMIN — DULOXETINE HYDROCHLORIDE 60 MG: 30 CAPSULE, DELAYED RELEASE ORAL at 08:23

## 2024-08-18 RX ADMIN — DULOXETINE HYDROCHLORIDE 60 MG: 30 CAPSULE, DELAYED RELEASE ORAL at 20:15

## 2024-08-18 RX ADMIN — CLONAZEPAM 1 MG: 1 TABLET ORAL at 20:15

## 2024-08-18 RX ADMIN — ATORVASTATIN CALCIUM 20 MG: 20 TABLET, FILM COATED ORAL at 20:15

## 2024-08-18 RX ADMIN — ACETAMINOPHEN 325MG 650 MG: 325 TABLET ORAL at 03:54

## 2024-08-18 RX ADMIN — ACETAMINOPHEN 325MG 650 MG: 325 TABLET ORAL at 18:07

## 2024-08-18 RX ADMIN — CLONAZEPAM 1 MG: 1 TABLET ORAL at 08:23

## 2024-08-18 RX ADMIN — COLCHICINE 0.6 MG: 0.6 CAPSULE ORAL at 18:08

## 2024-08-18 RX ADMIN — CLONAZEPAM 1 MG: 1 TABLET ORAL at 15:14

## 2024-08-18 RX ADMIN — AMITRIPTYLINE HYDROCHLORIDE 100 MG: 50 TABLET, FILM COATED ORAL at 20:15

## 2024-08-18 RX ADMIN — ASPIRIN 81 MG: 81 TABLET, COATED ORAL at 08:23

## 2024-08-18 RX ADMIN — LAMOTRIGINE 150 MG: 100 TABLET ORAL at 20:15

## 2024-08-18 RX ADMIN — LAMOTRIGINE 150 MG: 100 TABLET ORAL at 08:23

## 2024-08-18 RX ADMIN — COLCHICINE 0.6 MG: 0.6 CAPSULE ORAL at 03:54

## 2024-08-18 RX ADMIN — QUETIAPINE FUMARATE 100 MG: 100 TABLET ORAL at 20:15

## 2024-08-18 RX ADMIN — ACETAMINOPHEN 325MG 650 MG: 325 TABLET ORAL at 08:23

## 2024-08-18 RX ADMIN — Medication 2000 UNITS: at 08:23

## 2024-08-18 ASSESSMENT — PAIN SCALES - GENERAL
PAINLEVEL_OUTOF10: 1
PAINLEVEL_OUTOF10: 7
PAINLEVEL_OUTOF10: 1
PAINLEVEL_OUTOF10: 6
PAINLEVEL_OUTOF10: 8

## 2024-08-18 ASSESSMENT — PAIN DESCRIPTION - ORIENTATION
ORIENTATION: RIGHT

## 2024-08-18 ASSESSMENT — PAIN DESCRIPTION - PAIN TYPE
TYPE: CHRONIC PAIN
TYPE: ACUTE PAIN

## 2024-08-18 ASSESSMENT — PAIN - FUNCTIONAL ASSESSMENT
PAIN_FUNCTIONAL_ASSESSMENT: ACTIVITIES ARE NOT PREVENTED

## 2024-08-18 ASSESSMENT — PAIN DESCRIPTION - ONSET
ONSET: GRADUAL
ONSET: GRADUAL

## 2024-08-18 ASSESSMENT — PAIN DESCRIPTION - DESCRIPTORS
DESCRIPTORS: ACHING

## 2024-08-18 ASSESSMENT — PAIN DESCRIPTION - LOCATION
LOCATION: KNEE
LOCATION: KNEE
LOCATION: LEG

## 2024-08-18 ASSESSMENT — PAIN DESCRIPTION - FREQUENCY
FREQUENCY: CONTINUOUS
FREQUENCY: CONTINUOUS

## 2024-08-18 NOTE — BH NOTE
Patient appears to have slept for approximately 5 hours.Staff will continue to monitor and document any changes in behavior.

## 2024-08-18 NOTE — BH NOTE
Pt complains of pain related to gout and is requesting prn medication.  Pt given prn colchicine 0.6 mg and tylenol 650 mg for his pain which he rated at 7 on 0-10 pain scale.  Will continue to monitor for efficacy.

## 2024-08-19 PROCEDURE — 6370000000 HC RX 637 (ALT 250 FOR IP): Performed by: PSYCHIATRY & NEUROLOGY

## 2024-08-19 PROCEDURE — 1240000000 HC EMOTIONAL WELLNESS R&B

## 2024-08-19 PROCEDURE — 6370000000 HC RX 637 (ALT 250 FOR IP)

## 2024-08-19 PROCEDURE — 99232 SBSQ HOSP IP/OBS MODERATE 35: CPT | Performed by: PSYCHIATRY & NEUROLOGY

## 2024-08-19 RX ORDER — COLCHICINE 0.6 MG/1
1.2 CAPSULE ORAL ONCE
Status: DISCONTINUED | OUTPATIENT
Start: 2024-08-19 | End: 2024-08-29

## 2024-08-19 RX ORDER — COLCHICINE 0.6 MG/1
0.6 CAPSULE ORAL
Status: ACTIVE | OUTPATIENT
Start: 2024-08-19 | End: 2024-08-20

## 2024-08-19 RX ORDER — INDOMETHACIN 25 MG/1
50 CAPSULE ORAL 3 TIMES DAILY PRN
Status: DISCONTINUED | OUTPATIENT
Start: 2024-08-19 | End: 2024-09-30 | Stop reason: HOSPADM

## 2024-08-19 RX ADMIN — AMITRIPTYLINE HYDROCHLORIDE 100 MG: 50 TABLET, FILM COATED ORAL at 19:49

## 2024-08-19 RX ADMIN — INDOMETHACIN 50 MG: 25 CAPSULE ORAL at 01:22

## 2024-08-19 RX ADMIN — ASPIRIN 81 MG: 81 TABLET, COATED ORAL at 10:56

## 2024-08-19 RX ADMIN — ACETAMINOPHEN 325MG 650 MG: 325 TABLET ORAL at 00:52

## 2024-08-19 RX ADMIN — INDOMETHACIN 50 MG: 25 CAPSULE ORAL at 10:54

## 2024-08-19 RX ADMIN — ATORVASTATIN CALCIUM 20 MG: 20 TABLET, FILM COATED ORAL at 19:50

## 2024-08-19 RX ADMIN — LAMOTRIGINE 150 MG: 100 TABLET ORAL at 19:49

## 2024-08-19 RX ADMIN — Medication 2000 UNITS: at 10:56

## 2024-08-19 RX ADMIN — DULOXETINE HYDROCHLORIDE 60 MG: 30 CAPSULE, DELAYED RELEASE ORAL at 19:50

## 2024-08-19 RX ADMIN — COLCHICINE 0.6 MG: 0.6 CAPSULE ORAL at 08:41

## 2024-08-19 RX ADMIN — CLONAZEPAM 1 MG: 1 TABLET ORAL at 19:50

## 2024-08-19 RX ADMIN — QUETIAPINE FUMARATE 100 MG: 100 TABLET ORAL at 19:50

## 2024-08-19 RX ADMIN — LAMOTRIGINE 150 MG: 100 TABLET ORAL at 10:55

## 2024-08-19 RX ADMIN — CLONAZEPAM 1 MG: 1 TABLET ORAL at 14:53

## 2024-08-19 RX ADMIN — DULOXETINE HYDROCHLORIDE 60 MG: 30 CAPSULE, DELAYED RELEASE ORAL at 10:55

## 2024-08-19 RX ADMIN — CLONAZEPAM 1 MG: 1 TABLET ORAL at 10:55

## 2024-08-19 RX ADMIN — TRAZODONE HYDROCHLORIDE 50 MG: 50 TABLET ORAL at 00:52

## 2024-08-19 ASSESSMENT — PAIN DESCRIPTION - ONSET
ONSET: ON-GOING
ONSET: ON-GOING
ONSET: GRADUAL
ONSET: ON-GOING

## 2024-08-19 ASSESSMENT — PAIN SCALES - WONG BAKER
WONGBAKER_NUMERICALRESPONSE: NO HURT
WONGBAKER_NUMERICALRESPONSE: HURTS EVEN MORE
WONGBAKER_NUMERICALRESPONSE: HURTS A LITTLE BIT
WONGBAKER_NUMERICALRESPONSE: NO HURT
WONGBAKER_NUMERICALRESPONSE: HURTS LITTLE MORE
WONGBAKER_NUMERICALRESPONSE: HURTS LITTLE MORE
WONGBAKER_NUMERICALRESPONSE: NO HURT

## 2024-08-19 ASSESSMENT — PAIN DESCRIPTION - DESCRIPTORS
DESCRIPTORS: ACHING

## 2024-08-19 ASSESSMENT — PAIN SCALES - GENERAL
PAINLEVEL_OUTOF10: 6
PAINLEVEL_OUTOF10: 5
PAINLEVEL_OUTOF10: 8
PAINLEVEL_OUTOF10: 6
PAINLEVEL_OUTOF10: 5
PAINLEVEL_OUTOF10: 0
PAINLEVEL_OUTOF10: 2
PAINLEVEL_OUTOF10: 0
PAINLEVEL_OUTOF10: 2

## 2024-08-19 ASSESSMENT — PAIN DESCRIPTION - FREQUENCY
FREQUENCY: CONTINUOUS

## 2024-08-19 ASSESSMENT — PAIN DESCRIPTION - PAIN TYPE
TYPE: ACUTE PAIN

## 2024-08-19 ASSESSMENT — PAIN - FUNCTIONAL ASSESSMENT
PAIN_FUNCTIONAL_ASSESSMENT: ACTIVITIES ARE NOT PREVENTED
PAIN_FUNCTIONAL_ASSESSMENT: PREVENTS OR INTERFERES SOME ACTIVE ACTIVITIES AND ADLS
PAIN_FUNCTIONAL_ASSESSMENT: PREVENTS OR INTERFERES WITH MANY ACTIVE NOT PASSIVE ACTIVITIES
PAIN_FUNCTIONAL_ASSESSMENT: ACTIVITIES ARE NOT PREVENTED

## 2024-08-19 ASSESSMENT — PAIN DESCRIPTION - ORIENTATION
ORIENTATION: RIGHT
ORIENTATION: RIGHT
ORIENTATION: RIGHT;LEFT

## 2024-08-19 ASSESSMENT — PAIN DESCRIPTION - LOCATION
LOCATION: KNEE
LOCATION: KNEE
LOCATION: FOOT

## 2024-08-19 ASSESSMENT — PAIN DESCRIPTION - DIRECTION: RADIATING_TOWARDS: MY LEGS

## 2024-08-19 NOTE — GROUP NOTE
Art Therapy Group Progress Note    PATIENT SCHEDULED FOR GROUP AT: 9:30 AM     GROUP STOP TIME:  10:15 AM    ATTENDANCE: Moderate (7/16 participants)     TOPIC / FOCUS: Mood Mandala      GOALS: encouraged patients to create a mandala representing their mood, increase emotional communication skills, encourage insight, promote concentration, increase socialization, disrupt negative thinking, increase autonomy, promote creativity, reduce feelings of stress and anxiety, increase relaxation, promote emotional regulation skills, encourage positive coping skills     Notes:  Pt did not participate in art making. Pt stated they did not have feelings. Pt was encouraged to choose a color, Pt selected black. This writer suggested that the Pt fill in their mandala with black, if that represented nothingness for them. Pt was resistant and responded, \"I can't.\" This writer explained how can't creates barriers and obstacles before something was tried. Pt shared they just wanted to take their medications and sleep. Pt continues to be a passive participant in group.     Pt made a remark to another individual that they were at the end of their book with nothing to live for.     Status After Intervention:  Unchanged    Participation Level: None    Participation Quality: Resistant      Speech:  normal      Thought Process/Content: negative, concrete       Affective Functioning: Flat      Mood: anxious and dysphoric      Level of consciousness:  Preoccupied with \"I can't\" and feeling nothing      Response to Learning: Able to verbalize current knowledge/experience and Resistant      Endings: Suicidality, \"at the end of my book, nothing to live for\"     Modes of Intervention: Education, Support, Socialization, and Exploration      Discipline Responsible: Psychoeducational Specialist      Humza Nixon  Art Therapist, MA, ATR-P  Provisional Registered Art Therapist

## 2024-08-19 NOTE — GROUP NOTE
Group Therapy Note    Date: 8/19/2024    Group Start Time: 1500  Group End Time: 1555  Group Topic: Music Therapy      Toni Burger        Group Therapy Note    Attendees: 11/16    Group Focus: Music therapy group explored the themes of letting go and resiliency. The group utilized marimar analysis and collaborative music making to explore these themes. Group members were also encouraged to write down things they would like to let go of and release in a metaphorical fire.        Notes:  Pt demonstrated active engagement in group. Pt discussed negative aspects of song utilized in group and discussed how he viewed it as a dark song. Pt asked clarifying questions in group. Pt originally discussed how he was \"not musical\" and could not play an instrument and then actively engaged in the group's music making. At the end of group, pt discussed how his anxiety decreased. Pt discussed how he originally was doubting himself in being able to play an instrument, discussed how he figured out what to play and was able to engage in the music making. Pt also demonstrated deep breathing technique on his own that the group had closed with in relation to decrease in anxiety.      Status After Intervention:  Unchanged    Participation Level: Interactive    Participation Quality: Appropriate, Attentive, and Sharing      Speech:  normal      Thought Process/Content: Logical      Affective Functioning: Congruent      Mood: anxious and depressed      Level of consciousness:  Alert and Attentive      Response to Learning: Able to verbalize current knowledge/experience and Able to verbalize/acknowledge new learning      Endings: None Reported    Modes of Intervention: Support, Socialization, Exploration, and Media      Discipline Responsible: /Counselor      Signature:  LUCIAN Dickson, LPC  Board-Certified Music Therapist  Licensed Professional

## 2024-08-19 NOTE — GROUP NOTE
Art Therapy Group Progress Note    PATIENT SCHEDULED FOR GROUP AT: 1:00 PM     GROUP STOP TIME:  1:45 PM    ATTENDANCE: High (9/16 participants)     TOPIC / FOCUS:  Hold On vs. Let Go Hands       GOALS: identify current coping skills, identify counterproductive behaviors or habits, promote positive coping skills, encourage self-awareness, practice creativity, encourage focus, encourage communication     Notes:  Pt had difficulty understanding the directive. The Pt was provided multiple explanations by this writer and by their tablemate. Pt thinking was concrete such as wanting to hold onto their wedding ring. Pt shared in group that they wanted to let go of their wife's drinking problem. The Pt was reminded that the focus of the directive is things that they can do, control, or change.     Pt was concerned that they labeled the hands incorrectly, that they cannot spell, and that they were doing things wrong. Pt would write an answer, create self doubt and then obliterate their writing with the eraser.     Status After Intervention:  Unchanged    Participation Level: Minimal    Participation Quality: Appropriate and Resistant      Speech:  normal      Thought Process/Content: Logical      Affective Functioning: Flat      Mood: dysphoric      Level of consciousness:  Alert      Response to Learning: difficult to access due to concrete thinking and comprehension difficulty       Endings: None Reported    Modes of Intervention: Education, Support, Socialization, Exploration, and Clarifying      Discipline Responsible: Psychoeducational Specialist      Humza Nixon  Art Therapist, MA, ATR-P  Provisional Registered Art Therapist

## 2024-08-20 PROCEDURE — 6370000000 HC RX 637 (ALT 250 FOR IP): Performed by: PSYCHIATRY & NEUROLOGY

## 2024-08-20 PROCEDURE — 99221 1ST HOSP IP/OBS SF/LOW 40: CPT | Performed by: PSYCHIATRY & NEUROLOGY

## 2024-08-20 PROCEDURE — 1240000000 HC EMOTIONAL WELLNESS R&B

## 2024-08-20 RX ORDER — QUETIAPINE FUMARATE 100 MG/1
50 TABLET, FILM COATED ORAL DAILY
Status: DISCONTINUED | OUTPATIENT
Start: 2024-08-20 | End: 2024-08-24

## 2024-08-20 RX ADMIN — CLONAZEPAM 1 MG: 1 TABLET ORAL at 20:34

## 2024-08-20 RX ADMIN — LAMOTRIGINE 150 MG: 100 TABLET ORAL at 20:35

## 2024-08-20 RX ADMIN — QUETIAPINE FUMARATE 50 MG: 100 TABLET ORAL at 12:37

## 2024-08-20 RX ADMIN — AMITRIPTYLINE HYDROCHLORIDE 100 MG: 50 TABLET, FILM COATED ORAL at 20:34

## 2024-08-20 RX ADMIN — ATORVASTATIN CALCIUM 20 MG: 20 TABLET, FILM COATED ORAL at 20:35

## 2024-08-20 RX ADMIN — CLONAZEPAM 1 MG: 1 TABLET ORAL at 07:50

## 2024-08-20 RX ADMIN — DULOXETINE HYDROCHLORIDE 60 MG: 30 CAPSULE, DELAYED RELEASE ORAL at 20:34

## 2024-08-20 RX ADMIN — LAMOTRIGINE 150 MG: 100 TABLET ORAL at 07:50

## 2024-08-20 RX ADMIN — QUETIAPINE FUMARATE 100 MG: 100 TABLET ORAL at 20:34

## 2024-08-20 RX ADMIN — Medication 2000 UNITS: at 07:50

## 2024-08-20 RX ADMIN — DULOXETINE HYDROCHLORIDE 60 MG: 30 CAPSULE, DELAYED RELEASE ORAL at 07:50

## 2024-08-20 RX ADMIN — ASPIRIN 81 MG: 81 TABLET, COATED ORAL at 07:50

## 2024-08-20 RX ADMIN — CLONAZEPAM 1 MG: 1 TABLET ORAL at 14:26

## 2024-08-20 ASSESSMENT — PAIN SCALES - GENERAL
PAINLEVEL_OUTOF10: 0
PAINLEVEL_OUTOF10: 0

## 2024-08-20 ASSESSMENT — PAIN SCALES - WONG BAKER: WONGBAKER_NUMERICALRESPONSE: NO HURT

## 2024-08-20 NOTE — BH NOTE
Pt appeared to have slept for 7+ hours thus far. No disruption observed. Pt appears to be sleeping at this time. Will continue to monitor for safety and changes in behavior.

## 2024-08-20 NOTE — GROUP NOTE
Group Therapy Note    Date: 8/20/2024    Group Start Time: 1500  Group End Time: 1550  Group Topic: Music Therapy      Toni Burger        Group Therapy Note    Attendees: 9/14    Group Focus: Music therapy group focused on musical improvisation. The group started with creating a relaxing soundscape with instruments as therapist guided group members through a relaxation primarily involving mindfulness and breathwork. Group continued with collaborative music making with a focus on improvisation. The group may promote present-centered focus, stress reduction, and use of music as a coping skill.         Notes:  Pt was crying with his head down at the start of group and was crying prior to the group started. Pt appeared to participate in breathing exercises during initial relaxation. Pt demonstrated active listening while the group engaged in music making. Pt presented as calmer by the time the group ended.    Status After Intervention:  Improved    Participation Level: Active Listener    Participation Quality: Appropriate      Speech:  normal      Thought Process/Content: Logical      Affective Functioning: Congruent      Mood: tearful, sad      Level of consciousness:  Alert and Attentive      Response to Learning: Able to verbalize current knowledge/experience      Endings: None Reported    Modes of Intervention: Support, Socialization, Exploration, and Media      Discipline Responsible: /Counselor      Signature:  LUCIAN Dickson, LPC  Board-Certified Music Therapist  Licensed Professional Counselor

## 2024-08-20 NOTE — GROUP NOTE
Group Therapy Note    Date: 8/20/2024    Group Start Time: 0930  Group End Time: 1025  Group Topic: Music Therapy      Toni Burger        Group Therapy Note    Attendees: 11/16    Group Focus: Music therapy group explored future-focused thinking through marimar analysis, collaborative music making, and a written exercise. Group members listened to and discussed a song with lyrics, \"today is where your book begins, the rest is still unwritten\" and were encouraged to title the book of their life or their next chapter. The group closed with playing the original song listened to and discussed in group live with instruments to support the group process.        Notes:  Pt presented as dysphoric in group, frequently commenting about how he cannot do things including discussing how is \"writing is atrocious,\" how he can't spell, and also how he doesn't feel anything when asked about his mood. Pt reported feeling suicidal and mentioned to a peer that he felt like slamming his head into the wall. When this therapist asked if this was true, pt nodded his head yes. This therapist notified nursing staff of these statements immediately after group. With the prompt to title the book of their life or their next chapter, pt kept asking how to spell the word \"suicidal.\" Pt verbalized on more than one occasion that he did not understand the assignment or the song even after therapist explained the prompt. Pt interrupted group at times while peers were talking to ask this therapist questions and ask for feedback, seeming to demonstrate an amount of need that was higher than average. Pt also engaged in side conversation with a peer and was redirectable. Pt was also redirectable when he would ask this therapist for feedback while peers were sharing in group.    Status After Intervention:  Unchanged    Participation Level: Interactive    Participation Quality:

## 2024-08-20 NOTE — GROUP NOTE
Art Therapy Group Progress Note    PATIENT SCHEDULED FOR GROUP AT: 1:00 PM     GROUP STOP TIME:  1:45 PM    ATTENDANCE: Moderate (7/15 participants)     TOPIC / FOCUS: Tree of Strength     GOALS:  identify strengths, identify ways to use strengths as positive coping skills, increase self-esteem, increase personal awareness, promote creativity, encourage focus, increase distress tolerance     It is important to note that group was interrupted by a code atlas and Pts were asked to return to their rooms for Pt safety.     Notes:  Pt was able to trace their hand and identify some strengths. Pt selected talkative, enjoying drag racing with their son, and working on different things. The Pt shared in discussion. Pt continues to be preoccupied with self-doubt, however they were able to overcome it today and improve in their participation.     Participation Level: Interactive and Minimal in art making (frequent erasing/ obliteration)     Participation Quality: Appropriate and Sharing      Speech:  normal      Thought Process/Content: Logical, concrete, self-doubt       Affective Functioning: Flat      Mood: dysphoric      Level of consciousness:  Alert      Response to Learning: Able to verbalize current knowledge/experience      Endings: None Reported    Modes of Intervention: Education, Support, Socialization, Exploration, and Clarifying      Discipline Responsible: Psychoeducational Specialist      Humza Nixon  Art Therapist, MA, ATR-P  Provisional Registered Art Therapist

## 2024-08-21 PROCEDURE — 6370000000 HC RX 637 (ALT 250 FOR IP): Performed by: PSYCHIATRY & NEUROLOGY

## 2024-08-21 PROCEDURE — 99232 SBSQ HOSP IP/OBS MODERATE 35: CPT | Performed by: PSYCHIATRY & NEUROLOGY

## 2024-08-21 PROCEDURE — 1240000000 HC EMOTIONAL WELLNESS R&B

## 2024-08-21 RX ADMIN — DULOXETINE HYDROCHLORIDE 60 MG: 30 CAPSULE, DELAYED RELEASE ORAL at 20:18

## 2024-08-21 RX ADMIN — ATORVASTATIN CALCIUM 20 MG: 20 TABLET, FILM COATED ORAL at 20:18

## 2024-08-21 RX ADMIN — LAMOTRIGINE 150 MG: 100 TABLET ORAL at 08:51

## 2024-08-21 RX ADMIN — DULOXETINE HYDROCHLORIDE 60 MG: 30 CAPSULE, DELAYED RELEASE ORAL at 08:52

## 2024-08-21 RX ADMIN — CLONAZEPAM 1 MG: 1 TABLET ORAL at 08:53

## 2024-08-21 RX ADMIN — QUETIAPINE FUMARATE 50 MG: 100 TABLET ORAL at 08:52

## 2024-08-21 RX ADMIN — CLONAZEPAM 1 MG: 1 TABLET ORAL at 20:18

## 2024-08-21 RX ADMIN — Medication 2000 UNITS: at 08:53

## 2024-08-21 RX ADMIN — LAMOTRIGINE 150 MG: 100 TABLET ORAL at 20:18

## 2024-08-21 RX ADMIN — CLONAZEPAM 1 MG: 1 TABLET ORAL at 15:47

## 2024-08-21 RX ADMIN — ASPIRIN 81 MG: 81 TABLET, COATED ORAL at 08:53

## 2024-08-21 RX ADMIN — AMITRIPTYLINE HYDROCHLORIDE 100 MG: 50 TABLET, FILM COATED ORAL at 20:17

## 2024-08-21 RX ADMIN — QUETIAPINE FUMARATE 100 MG: 100 TABLET ORAL at 20:18

## 2024-08-21 ASSESSMENT — PAIN SCALES - GENERAL: PAINLEVEL_OUTOF10: 0

## 2024-08-21 NOTE — GROUP NOTE
Group Therapy Note    Date: 8/21/2024    Group Start Time: 1500  Group End Time: 1600  Group Topic: Recreational    [facility]    Kirsten Clark        Group Therapy Note    Attendees: 9/13  Group Type: Game: Family Feud/ Mental Health Family Feud   Group Focus: Recreational therapy group engaged patients through a group game. RT placed patients into two teams to encourage team building and asking a variety of questions to encourage critical thinking. The group can assist in increasing positive mood, reducing stress, and using good social, coping and problem-solving skills.            Notes: Patient participated in group using problem solving skills, sharing, and socializing with peers. Patients mood bright at times smiling and laughing. Patient discussed ways to handle financial stress.     Status After Intervention:  Unchanged    Participation Level: Active Listener and Interactive    Participation Quality: Appropriate, Attentive, and Sharing      Speech:  normal      Thought Process/Content: Logical      Affective Functioning: Congruent      Mood: euthymic      Level of consciousness:  Alert and Attentive      Response to Learning: Able to verbalize current knowledge/experience      Endings: None Reported    Modes of Intervention: Education, Socialization, Problem-solving, and Activity      Recreational Therapist  KIRSTEN CLARK

## 2024-08-21 NOTE — BH NOTE
Pt appeared to have slept for 6.50 hours thus far. Will continue to monitor for safety and changes in behavior.

## 2024-08-21 NOTE — GROUP NOTE
Group Therapy Note    Date: 8/21/2024    Group Start Time: 0940  Group End Time: 1025  Group Topic: Recreational    [facility]    Kirsten Clark        Group Therapy Note    Attendees: 9/15    Group type: Stretching   Group Focus: This recreational group engaged patients in physical activity. Recreational therapists led group members in guided stretches. After stretches patients discussed different physical activities and the benefits of being physical. This group may help reduce feelings of depression and stress and enhance mood and over emotional well-being.            Notes: Patient reported feeling \"nervous\" at the start of group and shared he was ready to be discharged. Patient was engaged, completed guided exercises, and shared in discussions. Patient shared walks for physical engagement. Patient also shared relaxation techniques at the end of group, that his daughter taught him, and shared it \"helps him\".    Status After Intervention:  Unchanged    Participation Level: Active Listener and Interactive    Participation Quality: Appropriate, Attentive, and Sharing      Speech:  normal      Thought Process/Content: Logical      Affective Functioning: Congruent      Mood: euthymic      Level of consciousness:  Alert and Attentive      Response to Learning: Able to verbalize current knowledge/experience      Endings: None Reported    Modes of Intervention: Education, Socialization, Movement, and Media      Recreational Therapist  KIRSTEN CLARK

## 2024-08-22 PROCEDURE — 6370000000 HC RX 637 (ALT 250 FOR IP): Performed by: PSYCHIATRY & NEUROLOGY

## 2024-08-22 PROCEDURE — 1240000000 HC EMOTIONAL WELLNESS R&B

## 2024-08-22 PROCEDURE — 99232 SBSQ HOSP IP/OBS MODERATE 35: CPT | Performed by: PSYCHIATRY & NEUROLOGY

## 2024-08-22 RX ORDER — AMLODIPINE BESYLATE 5 MG/1
2.5 TABLET ORAL DAILY
Status: DISCONTINUED | OUTPATIENT
Start: 2024-08-22 | End: 2024-09-17

## 2024-08-22 RX ORDER — AMLODIPINE BESYLATE 5 MG/1
5 TABLET ORAL DAILY
Status: DISCONTINUED | OUTPATIENT
Start: 2024-08-22 | End: 2024-08-22

## 2024-08-22 RX ADMIN — ASPIRIN 81 MG: 81 TABLET, COATED ORAL at 08:35

## 2024-08-22 RX ADMIN — AMITRIPTYLINE HYDROCHLORIDE 100 MG: 50 TABLET, FILM COATED ORAL at 20:25

## 2024-08-22 RX ADMIN — CLONAZEPAM 1 MG: 1 TABLET ORAL at 16:03

## 2024-08-22 RX ADMIN — LAMOTRIGINE 150 MG: 100 TABLET ORAL at 20:23

## 2024-08-22 RX ADMIN — ATORVASTATIN CALCIUM 20 MG: 20 TABLET, FILM COATED ORAL at 20:24

## 2024-08-22 RX ADMIN — Medication 2000 UNITS: at 08:35

## 2024-08-22 RX ADMIN — CLONAZEPAM 1 MG: 1 TABLET ORAL at 08:35

## 2024-08-22 RX ADMIN — QUETIAPINE FUMARATE 100 MG: 100 TABLET ORAL at 20:24

## 2024-08-22 RX ADMIN — CLONAZEPAM 1 MG: 1 TABLET ORAL at 20:25

## 2024-08-22 RX ADMIN — AMLODIPINE BESYLATE 2.5 MG: 5 TABLET ORAL at 12:02

## 2024-08-22 RX ADMIN — DULOXETINE HYDROCHLORIDE 60 MG: 30 CAPSULE, DELAYED RELEASE ORAL at 20:23

## 2024-08-22 RX ADMIN — LAMOTRIGINE 150 MG: 100 TABLET ORAL at 08:35

## 2024-08-22 RX ADMIN — DULOXETINE HYDROCHLORIDE 60 MG: 30 CAPSULE, DELAYED RELEASE ORAL at 08:35

## 2024-08-22 RX ADMIN — QUETIAPINE FUMARATE 50 MG: 100 TABLET ORAL at 08:35

## 2024-08-22 ASSESSMENT — PAIN SCALES - GENERAL: PAINLEVEL_OUTOF10: 0

## 2024-08-22 NOTE — GROUP NOTE
Group Therapy Note    Date: 8/22/2024    Group Start Time: 0930  Group End Time: 1030  Group Topic: Music Therapy      Toni Burger        Group Therapy Note    Attendees: 8/12    Group Focus: Music therapy group consisted of marimar analysis of several songs around the theme of finding hope or a positive mindset. Group members identified and processed lyrics in relation to their lives and lyrics that relate to positive thinking. The group may also promote self-expression, self-awareness, and use of music as a coping skill.       Notes:  Pt attended group and engaged in the group. Pt presented as calm and identified and discussed a song that he enjoys. Pt discussed memories associated with the song and the idea of a mother gator protecting their young.    Status After Intervention:  Unchanged    Participation Level: Interactive    Participation Quality: Appropriate and Attentive      Speech:  normal      Thought Process/Content: Logical      Affective Functioning: Congruent      Mood: Calm      Level of consciousness:  Alert and Attentive      Response to Learning: Able to verbalize current knowledge/experience      Endings: None Reported    Modes of Intervention: Support, Socialization, Exploration, and Media      Discipline Responsible: /Counselor      Signature:  LUCIAN Dickson, LPC  Board-Certified Music Therapist  Licensed Professional Counselor

## 2024-08-22 NOTE — GROUP NOTE
Group Therapy Note    Date: 8/22/2024    Group Start Time: 1510  Group End Time: 1600  Group Topic: Recreational    [facility]    Kirsten Clark        Group Therapy Note    Attendees: 7/12    Group Focus: This recreational therapy group focused on relationship building and support. Patients discussed and answered a variety of questions related to emotions, future imagining, resilience, optimism, and gratitude. The group may help enhance emotional competence, self-awareness/compassion, mood, communications skills, and decrease stress.          Notes: Patient reported feeling \"nervous\" and related his nervousness to wanting to be better when he leave. Patient discussed issues he has with his wife drinking and asking her to stop, and expressed \"feeling he is doing the wrong thing\". 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      Status After Intervention:  Unchanged    Participation Level: Active Listener and Interactive    Participation Quality: Appropriate, Attentive, and Sharing      Speech:  normal      Thought Process/Content: Logical      Affective Functioning: Congruent      Mood: anxious      Level of consciousness:  Alert and Attentive      Response to Learning: Able to verbalize current knowledge/experience      Endings: None Reported    Modes of Intervention: Socialization, Clarifying, and Activity      Recreational Therapist  KIRSTEN CLARK

## 2024-08-22 NOTE — GROUP NOTE
Group Therapy Note    Date: 8/22/2024    Group Start Time: 1430  Group End Time: 1500  Group Topic: Psychoeducation      Haley Martinez        Group Therapy Note    Attendees: 7  Group Topic- Discussing Accountability, social connections and emotional connections that support necessary to foster mental well-being.        During group patient continued expressing how depressed he was and how anxious he gets. When redirected the patient only wanted to discuss his depression. Group Facilitator asked patient to share something positive and the patient continued to share his depression.     Status After Intervention:  Unchanged    Participation Level: Active Listener and Interactive    Participation Quality: Attentive and Sharing      Speech:  normal      Thought Process/Content: Logical      Affective Functioning: Congruent      Mood: euthymic      Level of consciousness:  Attentive      Response to Learning: Resistant      Endings: None Reported    Modes of Intervention: Education, Support, Socialization, and Exploration      Discipline Responsible: /Counselor      Signature:  Haley Martinez

## 2024-08-23 PROCEDURE — 6370000000 HC RX 637 (ALT 250 FOR IP): Performed by: PSYCHIATRY & NEUROLOGY

## 2024-08-23 PROCEDURE — 99232 SBSQ HOSP IP/OBS MODERATE 35: CPT | Performed by: PSYCHIATRY & NEUROLOGY

## 2024-08-23 PROCEDURE — 1240000000 HC EMOTIONAL WELLNESS R&B

## 2024-08-23 RX ADMIN — DULOXETINE HYDROCHLORIDE 60 MG: 30 CAPSULE, DELAYED RELEASE ORAL at 20:12

## 2024-08-23 RX ADMIN — LAMOTRIGINE 150 MG: 100 TABLET ORAL at 08:00

## 2024-08-23 RX ADMIN — CLONAZEPAM 1 MG: 1 TABLET ORAL at 15:12

## 2024-08-23 RX ADMIN — AMITRIPTYLINE HYDROCHLORIDE 100 MG: 50 TABLET, FILM COATED ORAL at 20:13

## 2024-08-23 RX ADMIN — LAMOTRIGINE 150 MG: 100 TABLET ORAL at 20:12

## 2024-08-23 RX ADMIN — Medication 2000 UNITS: at 08:01

## 2024-08-23 RX ADMIN — CLONAZEPAM 1 MG: 1 TABLET ORAL at 08:01

## 2024-08-23 RX ADMIN — CLONAZEPAM 1 MG: 1 TABLET ORAL at 20:12

## 2024-08-23 RX ADMIN — AMLODIPINE BESYLATE 2.5 MG: 5 TABLET ORAL at 08:02

## 2024-08-23 RX ADMIN — ASPIRIN 81 MG: 81 TABLET, COATED ORAL at 08:01

## 2024-08-23 RX ADMIN — QUETIAPINE FUMARATE 50 MG: 100 TABLET ORAL at 08:01

## 2024-08-23 RX ADMIN — HYDROXYZINE HYDROCHLORIDE 50 MG: 50 TABLET, FILM COATED ORAL at 12:15

## 2024-08-23 RX ADMIN — ATORVASTATIN CALCIUM 20 MG: 20 TABLET, FILM COATED ORAL at 20:13

## 2024-08-23 RX ADMIN — DULOXETINE HYDROCHLORIDE 60 MG: 30 CAPSULE, DELAYED RELEASE ORAL at 08:01

## 2024-08-23 RX ADMIN — QUETIAPINE FUMARATE 100 MG: 100 TABLET ORAL at 20:13

## 2024-08-23 ASSESSMENT — PAIN SCALES - WONG BAKER: WONGBAKER_NUMERICALRESPONSE: NO HURT

## 2024-08-23 ASSESSMENT — PAIN SCALES - GENERAL: PAINLEVEL_OUTOF10: 0

## 2024-08-23 NOTE — GROUP NOTE
Group Therapy Note    Date: 8/23/2024    Group Start Time: 1300  Group End Time: 1400  Group Topic: Music Therapy    Toni Burger        Group Therapy Note    Attendees: 7/13    Group Focus: Music therapy group consisted of reading the story behind the song “Let It Be” by the Sajan, an individual songwriting exercise, and sharing of group members' song re-writes. Group members identified struggles, supports and coping skills through their songs. Some group members also engaged in collaborative music making with the song which may also support present-centered focus and stress reduction.       Notes:  Pt stated, \"it's been a bad day,\" when asked how he was feeling at the start of group. Therapist encouraged pt in playing ocean drum today to support pt in trying new/various coping skills. Pt engaged in playing ocean drum during group. Pt declined to engage in songwriting exercise, stating that he could not do it. Pt stated that he felt calmer at the end of group and mentioned both medication and the ocean drum as possible contributing factors to feeling calmer, stating he did not know which one helped.     Status After Intervention:  Unchanged    Participation Level: Interactive    Participation Quality: Appropriate, Attentive, and Sharing      Speech:  normal      Thought Process/Content: Logical      Affective Functioning: Congruent      Mood: anxious and depressed      Level of consciousness:  Alert and Attentive      Response to Learning: Able to verbalize current knowledge/experience      Endings: None Reported    Modes of Intervention: Support, Socialization, Exploration, and Media      Discipline Responsible: /Counselor      Signature:  LUCIAN Dickson, LPC  Board-Certified Music Therapist  Licensed Professional Counselor

## 2024-08-23 NOTE — GROUP NOTE
Group Therapy Note    Date: 8/23/2024    Group Start Time: 1430  Group End Time: 1500  Group Topic: Psychoeducation        Haley Martinez        Group Therapy Note    Attendees: 7  Group topic- To discuss feeling and emotions. To discuss additional supports needed to cope during challenging moments.            Status After Intervention:  Unchanged    Participation Level: Active Listener    Participation Quality: Appropriate and Attentive      Speech:  normal      Thought Process/Content: Logical      Affective Functioning: Congruent      Mood: Patient stated that he was depressed and did not know what would change.      Level of consciousness:  Alert      Response to Learning: Able to verbalize current knowledge/experience, Able to verbalize/acknowledge new learning, Able to retain information, and Capable of insight      Endings: None Reported    Modes of Intervention: Education and Support      Discipline Responsible: /Counselor      Signature:  Haley Martinez

## 2024-08-23 NOTE — GROUP NOTE
Art Therapy Group Progress Note    PATIENT SCHEDULED FOR GROUP AT: 9:30 AM     GROUP STOP TIME:  10:15 AM    ATTENDANCE: Moderate (7/13 participants)     TOPIC / FOCUS: Mindfulness - Motivational Word Saida     GOALS: define mindfulness, identify current feelings, encourage healthy emotional management, practicing positive coping skills, encourage focus, promote self-awareness, reduce stress and anxiety     Notes:  Pt appeared to be depressed and resistant. Pt stated they were feeling tired. This writer helped the Pt start their motivational word worksheet. The Pt identified hopeful as their word of the day. The PT selected green and yellow for colors. This writer gathered the green and yellow crayons and markers from the boxes to lessen the Pt's decision paralysis. Pt minimally participated in coloring before stating they needed to return to their room to rest. Pt appeared upset, saying \"I can't just sleep my life away.\"      Status After Intervention:  Unchanged    Participation Level: Minimal    Participation Quality: Appropriate, Resistant, and Lethargic      Speech:  normal      Thought Process/Content: Logical, concrete, negative       Affective Functioning: Blunted      Mood: dysphoric      Level of consciousness:  Drowsy      Response to Learning: Able to verbalize current knowledge/experience and Resistant      Endings: None Reported    Modes of Intervention: Education, Support, Socialization, Exploration, Clarifying, and Problem-solving      Discipline Responsible: Psychoeducational Specialist    Humza Nixon  Art Therapist, MA, ATR-P  Provisional Registered Art Therapist

## 2024-08-23 NOTE — GROUP NOTE
Group Therapy Note    Date: 8/23/2024    Group Start Time: 1220  Group End Time: 1300  Group Topic: Recreational    [facility]    Jolly Clark        Group Therapy Note    Attendees: 6/13     [employer] Music   Focus: Recreational therapist engaged patients in Music [employer] where they had to name the artist of the song being played.This group may help boost serotonin, relieve symptoms of anxiety, and increase enthusiasm.            Notes: Patient did not attend group.     Recreational Therapist   Jolly Clark

## 2024-08-24 PROCEDURE — 99232 SBSQ HOSP IP/OBS MODERATE 35: CPT | Performed by: PSYCHIATRY & NEUROLOGY

## 2024-08-24 PROCEDURE — 6370000000 HC RX 637 (ALT 250 FOR IP): Performed by: PSYCHIATRY & NEUROLOGY

## 2024-08-24 PROCEDURE — 1240000000 HC EMOTIONAL WELLNESS R&B

## 2024-08-24 RX ORDER — QUETIAPINE FUMARATE 100 MG/1
100 TABLET, FILM COATED ORAL 2 TIMES DAILY
Status: DISCONTINUED | OUTPATIENT
Start: 2024-08-24 | End: 2024-08-29

## 2024-08-24 RX ADMIN — CLONAZEPAM 1 MG: 1 TABLET ORAL at 08:02

## 2024-08-24 RX ADMIN — DULOXETINE HYDROCHLORIDE 60 MG: 30 CAPSULE, DELAYED RELEASE ORAL at 20:31

## 2024-08-24 RX ADMIN — CLONAZEPAM 1 MG: 1 TABLET ORAL at 15:24

## 2024-08-24 RX ADMIN — CLONAZEPAM 1 MG: 1 TABLET ORAL at 20:31

## 2024-08-24 RX ADMIN — DULOXETINE HYDROCHLORIDE 60 MG: 30 CAPSULE, DELAYED RELEASE ORAL at 08:01

## 2024-08-24 RX ADMIN — AMLODIPINE BESYLATE 2.5 MG: 5 TABLET ORAL at 08:02

## 2024-08-24 RX ADMIN — Medication 2000 UNITS: at 08:01

## 2024-08-24 RX ADMIN — LAMOTRIGINE 150 MG: 100 TABLET ORAL at 08:01

## 2024-08-24 RX ADMIN — HYDROXYZINE HYDROCHLORIDE 50 MG: 50 TABLET, FILM COATED ORAL at 08:26

## 2024-08-24 RX ADMIN — QUETIAPINE FUMARATE 50 MG: 100 TABLET ORAL at 08:02

## 2024-08-24 RX ADMIN — ASPIRIN 81 MG: 81 TABLET, COATED ORAL at 08:02

## 2024-08-24 RX ADMIN — QUETIAPINE FUMARATE 100 MG: 100 TABLET ORAL at 20:31

## 2024-08-24 RX ADMIN — LAMOTRIGINE 150 MG: 100 TABLET ORAL at 20:31

## 2024-08-24 RX ADMIN — ATORVASTATIN CALCIUM 20 MG: 20 TABLET, FILM COATED ORAL at 20:31

## 2024-08-24 RX ADMIN — AMITRIPTYLINE HYDROCHLORIDE 100 MG: 50 TABLET, FILM COATED ORAL at 20:31

## 2024-08-24 ASSESSMENT — PAIN SCALES - GENERAL
PAINLEVEL_OUTOF10: 0

## 2024-08-24 ASSESSMENT — PAIN SCALES - WONG BAKER
WONGBAKER_NUMERICALRESPONSE: NO HURT
WONGBAKER_NUMERICALRESPONSE: NO HURT

## 2024-08-24 NOTE — GROUP NOTE
Art Therapy Group Progress Note    PATIENT SCHEDULED FOR GROUP AT: 12:00 PM     GROUP STOP TIME:  12:45 PM    ATTENDANCE: Moderate (4/9 participants)     TOPIC / FOCUS:  Create a Personal Symbol of Hope (air-dry niko)     GOALS:  practice mindfulness through sensory interactions, decrease symptoms of stress, promote healthful coping strategies, encourage creativity, reduce negative thoughts and feelings, increase emotional communication skills, increase autonomy, encourage focus     PARTICIPATION LEVEL:  Pt did not attend.     Humza Nixon  Art Therapist, MA, ATR-P  Provisional Registered Art Therapist     Pt declined to participate because they were feeling anxiety symptoms and were afraid to leave their room. Pt entered day area toward the end of group and observed group discussion.

## 2024-08-25 PROCEDURE — 6370000000 HC RX 637 (ALT 250 FOR IP): Performed by: PSYCHIATRY & NEUROLOGY

## 2024-08-25 PROCEDURE — 1240000000 HC EMOTIONAL WELLNESS R&B

## 2024-08-25 PROCEDURE — 99232 SBSQ HOSP IP/OBS MODERATE 35: CPT | Performed by: PSYCHIATRY & NEUROLOGY

## 2024-08-25 RX ADMIN — ATORVASTATIN CALCIUM 20 MG: 20 TABLET, FILM COATED ORAL at 20:18

## 2024-08-25 RX ADMIN — QUETIAPINE FUMARATE 100 MG: 100 TABLET ORAL at 20:18

## 2024-08-25 RX ADMIN — DULOXETINE HYDROCHLORIDE 60 MG: 30 CAPSULE, DELAYED RELEASE ORAL at 07:43

## 2024-08-25 RX ADMIN — LAMOTRIGINE 150 MG: 100 TABLET ORAL at 07:43

## 2024-08-25 RX ADMIN — CLONAZEPAM 1 MG: 1 TABLET ORAL at 21:00

## 2024-08-25 RX ADMIN — AMLODIPINE BESYLATE 2.5 MG: 5 TABLET ORAL at 07:43

## 2024-08-25 RX ADMIN — DULOXETINE HYDROCHLORIDE 60 MG: 30 CAPSULE, DELAYED RELEASE ORAL at 20:18

## 2024-08-25 RX ADMIN — CLONAZEPAM 1 MG: 1 TABLET ORAL at 07:43

## 2024-08-25 RX ADMIN — Medication 2000 UNITS: at 07:43

## 2024-08-25 RX ADMIN — ASPIRIN 81 MG: 81 TABLET, COATED ORAL at 07:44

## 2024-08-25 RX ADMIN — QUETIAPINE FUMARATE 100 MG: 100 TABLET ORAL at 07:43

## 2024-08-25 RX ADMIN — HYDROXYZINE HYDROCHLORIDE 50 MG: 50 TABLET, FILM COATED ORAL at 10:27

## 2024-08-25 RX ADMIN — LAMOTRIGINE 150 MG: 100 TABLET ORAL at 21:00

## 2024-08-25 RX ADMIN — AMITRIPTYLINE HYDROCHLORIDE 100 MG: 50 TABLET, FILM COATED ORAL at 20:18

## 2024-08-25 RX ADMIN — CLONAZEPAM 1 MG: 1 TABLET ORAL at 14:53

## 2024-08-25 ASSESSMENT — PAIN SCALES - WONG BAKER: WONGBAKER_NUMERICALRESPONSE: NO HURT

## 2024-08-25 ASSESSMENT — PAIN SCALES - GENERAL
PAINLEVEL_OUTOF10: 0
PAINLEVEL_OUTOF10: 0

## 2024-08-26 PROCEDURE — 6370000000 HC RX 637 (ALT 250 FOR IP): Performed by: PSYCHIATRY & NEUROLOGY

## 2024-08-26 PROCEDURE — 1240000000 HC EMOTIONAL WELLNESS R&B

## 2024-08-26 PROCEDURE — 99232 SBSQ HOSP IP/OBS MODERATE 35: CPT | Performed by: PSYCHIATRY & NEUROLOGY

## 2024-08-26 PROCEDURE — 6360000002 HC RX W HCPCS: Performed by: PSYCHIATRY & NEUROLOGY

## 2024-08-26 RX ORDER — LORAZEPAM 2 MG/ML
1 INJECTION INTRAMUSCULAR ONCE
Status: COMPLETED | OUTPATIENT
Start: 2024-08-26 | End: 2024-08-26

## 2024-08-26 RX ORDER — BACTERIOSTATIC WATER 1 ML/ML
10 INJECTION, SOLUTION INTRAMUSCULAR; INTRAVENOUS; SUBCUTANEOUS
Status: DISCONTINUED | OUTPATIENT
Start: 2024-08-26 | End: 2024-08-26

## 2024-08-26 RX ORDER — ZIPRASIDONE MESYLATE 20 MG/ML
20 INJECTION, POWDER, LYOPHILIZED, FOR SOLUTION INTRAMUSCULAR ONCE
Status: DISCONTINUED | OUTPATIENT
Start: 2024-08-26 | End: 2024-08-26

## 2024-08-26 RX ORDER — ZIPRASIDONE MESYLATE 20 MG/ML
10 INJECTION, POWDER, LYOPHILIZED, FOR SOLUTION INTRAMUSCULAR ONCE
Status: DISCONTINUED | OUTPATIENT
Start: 2024-08-26 | End: 2024-08-26

## 2024-08-26 RX ADMIN — ASPIRIN 81 MG: 81 TABLET, COATED ORAL at 08:56

## 2024-08-26 RX ADMIN — DULOXETINE HYDROCHLORIDE 60 MG: 30 CAPSULE, DELAYED RELEASE ORAL at 08:56

## 2024-08-26 RX ADMIN — Medication 2000 UNITS: at 08:56

## 2024-08-26 RX ADMIN — CLONAZEPAM 1 MG: 1 TABLET ORAL at 15:10

## 2024-08-26 RX ADMIN — CLONAZEPAM 1 MG: 1 TABLET ORAL at 20:54

## 2024-08-26 RX ADMIN — QUETIAPINE FUMARATE 100 MG: 100 TABLET ORAL at 08:56

## 2024-08-26 RX ADMIN — LAMOTRIGINE 150 MG: 100 TABLET ORAL at 20:54

## 2024-08-26 RX ADMIN — DULOXETINE HYDROCHLORIDE 60 MG: 30 CAPSULE, DELAYED RELEASE ORAL at 20:54

## 2024-08-26 RX ADMIN — AMLODIPINE BESYLATE 2.5 MG: 5 TABLET ORAL at 08:57

## 2024-08-26 RX ADMIN — LAMOTRIGINE 150 MG: 100 TABLET ORAL at 08:56

## 2024-08-26 RX ADMIN — QUETIAPINE FUMARATE 100 MG: 100 TABLET ORAL at 20:54

## 2024-08-26 RX ADMIN — LORAZEPAM 1 MG: 2 INJECTION, SOLUTION INTRAMUSCULAR; INTRAVENOUS at 09:29

## 2024-08-26 RX ADMIN — ATORVASTATIN CALCIUM 20 MG: 20 TABLET, FILM COATED ORAL at 20:56

## 2024-08-26 RX ADMIN — AMITRIPTYLINE HYDROCHLORIDE 100 MG: 50 TABLET, FILM COATED ORAL at 20:56

## 2024-08-26 RX ADMIN — CLONAZEPAM 1 MG: 1 TABLET ORAL at 08:56

## 2024-08-26 ASSESSMENT — PAIN SCALES - GENERAL
PAINLEVEL_OUTOF10: 0
PAINLEVEL_OUTOF10: 0

## 2024-08-26 NOTE — GROUP NOTE
Art Therapy Group Progress Note    PATIENT SCHEDULED FOR GROUP AT: 1:00 PM     GROUP STOP TIME:  1:45 PM    ATTENDANCE: Moderate (2/11 participants)     TOPIC / FOCUS: Spectrum of emotion and congruent coping     GOALS:  emotion identification, increasing mindfulness of emotion experience, identifying current coping skills, increasing knowledge of appropriate coping skills, creating coping skill plan, encouraging self-awareness, promoting creativity and self-expression    PARTICIPATION LEVEL:  Pt did not attend.     Humza Nixon  Art Therapist, MA, ATR-P  Provisional Registered Art Therapist     Pt visiting with wife during group session.

## 2024-08-26 NOTE — GROUP NOTE
Group Therapy Note    Date: 8/26/2024    Group Start Time: 0930  Group End Time: 1015  Group Topic: Recreational    [facility]    Jolly Clark        Group Therapy Note    Attendees: 7/13    Group Type: Anxiety Thumball    Group Focus: Patients used thumball to learn coping skills for anxiety, while increasing social skills. Group members were able to answer questions, listen and communicate with peers on handling anxious feelings. This group may help enhance social skills, coping skills, mood, and decrease stress and anxiety.          Notes: Patient did not attend group.   Recreational Therapist   Jolly Clark

## 2024-08-26 NOTE — GROUP NOTE
Group Therapy Note    Date: 8/26/2024    Group Start Time: 1500  Group End Time: 1555  Group Topic: Music Therapy      Toni Burger        Group Therapy Note    Attendees: 5/9    Group Focus: Music therapy group started with a bell choir intervention where group members were given lyrics color coded to resonator bells to support reality orientation, focus, and stress reduction. The group continued with an expressive art intervention utilizing a metaphor from the song where group members were prompted to identify something that they are struggling with, their goal or what they are working towards, and then identify coping skills and/or items that can assist them or connect them towards their goal.        Notes:  At the start of group, pt reported that he was not having a good day. Pt discussed how he had a visit with his wife today and how his heart was \"not jumping for lisa,\" and presented with dysphoric affect. Pt continued to discuss things he can't do in group, which has been a pattern for him in prior music therapy groups. Pt made minimal effort to try bell choir intervention in group, stating that he couldn't do it before attempting. Pt originally stated he could not do the expressive arts intervention and with much individual assistance from recreational therapist, pt identified that he is struggling with \"going to [facility]\" and that his goal or something he is working towards is a martita day. Pt began identifying his barriers towards his goal and needed reframing from therapists to assist him with identifying what can help him towards his goal. Pt identified his wife, changing his mind, and going home as things that can lead him towards his goal. Pt put his head down at times in group and discussed how what he did was not good enough and continued to make negative self-appraisals.    Status After Intervention:  Unchanged    Participation Level:

## 2024-08-27 PROCEDURE — 1240000000 HC EMOTIONAL WELLNESS R&B

## 2024-08-27 PROCEDURE — 99232 SBSQ HOSP IP/OBS MODERATE 35: CPT | Performed by: PSYCHIATRY & NEUROLOGY

## 2024-08-27 PROCEDURE — 6370000000 HC RX 637 (ALT 250 FOR IP): Performed by: PSYCHIATRY & NEUROLOGY

## 2024-08-27 RX ADMIN — QUETIAPINE FUMARATE 100 MG: 100 TABLET ORAL at 20:12

## 2024-08-27 RX ADMIN — QUETIAPINE FUMARATE 100 MG: 100 TABLET ORAL at 08:57

## 2024-08-27 RX ADMIN — CLONAZEPAM 1 MG: 1 TABLET ORAL at 20:12

## 2024-08-27 RX ADMIN — DULOXETINE HYDROCHLORIDE 60 MG: 30 CAPSULE, DELAYED RELEASE ORAL at 20:11

## 2024-08-27 RX ADMIN — AMITRIPTYLINE HYDROCHLORIDE 100 MG: 50 TABLET, FILM COATED ORAL at 20:12

## 2024-08-27 RX ADMIN — AMLODIPINE BESYLATE 2.5 MG: 5 TABLET ORAL at 08:57

## 2024-08-27 RX ADMIN — DULOXETINE HYDROCHLORIDE 60 MG: 30 CAPSULE, DELAYED RELEASE ORAL at 08:56

## 2024-08-27 RX ADMIN — LAMOTRIGINE 150 MG: 100 TABLET ORAL at 08:57

## 2024-08-27 RX ADMIN — CLONAZEPAM 1 MG: 1 TABLET ORAL at 08:56

## 2024-08-27 RX ADMIN — Medication 2000 UNITS: at 08:56

## 2024-08-27 RX ADMIN — LAMOTRIGINE 150 MG: 100 TABLET ORAL at 20:11

## 2024-08-27 RX ADMIN — LORAZEPAM 1 MG: 1 TABLET ORAL at 13:12

## 2024-08-27 RX ADMIN — ASPIRIN 81 MG: 81 TABLET, COATED ORAL at 08:57

## 2024-08-27 RX ADMIN — ATORVASTATIN CALCIUM 20 MG: 20 TABLET, FILM COATED ORAL at 20:12

## 2024-08-27 RX ADMIN — CLONAZEPAM 1 MG: 1 TABLET ORAL at 15:30

## 2024-08-27 ASSESSMENT — PAIN SCALES - GENERAL: PAINLEVEL_OUTOF10: 0

## 2024-08-27 NOTE — GROUP NOTE
Group Therapy Note    Date: 8/27/2024    Group Start Time: 1300  Group End Time: 1345  Group Topic: Music Therapy    Toni Burger        Group Therapy Note    Attendees: 5/10    Group Focus: Music therapy group utilized the intervention of music-assisted progressive muscle relaxation (PMR). Therapist provided psychoeducation regarding the technique, guided the group through PMR, and assisted with verbal processing after the experience. The group continued with marimar analysis of a song with themes related to fear and coping with difficulties in life.       Notes:  Pt was crying, pounding his fists against various counters and tables, and highly worked up prior to group starting while in the group area. Therapist and unit staff attempted to work with patient on emotion regulation techniques. After group started, pt continued to be worked up and then went back to his room. At one point, pt came out into the group area in the same emotional state and flipped over a chair. Pt was asked to leave the group area due to flipping the chair.    Discipline Responsible: /Counselor      Signature:  LUCIAN Dickson, LPC  Board-Certified Music Therapist  Licensed Professional Counselor

## 2024-08-27 NOTE — GROUP NOTE
Group Therapy Note    Date: 8/27/2024    Group Start Time: 1510  Group End Time: 1545  Group Topic: Recreational    [facility]    Kirsten Clark        Group Therapy Note    Attendees: 5/10    Group: Vision Board  Focus: Recreational therapy group engaged patients in a group that focused on focus clarifying goals and values . Patients created a vision board to help clarify where they're going, giving their mind direction and prompts to begin mapping out how to execute. This group may promote motivation, growth, mindfulness and focus, build confidence, self-expression, and enhance mood.            Notes: Patient joined group stating he wasn't \"doing feeling\" and \"I can't do this; I can just sit here\". Patient was encouraged by therapist and nurse working one on one with patient to focus on positive things, pts mind centered around negative things and self-doubt. Pt continued to state, \" I just can't do anything\", \" I feel empty\", \"I don't know\", while putting his head down and placing his hands on his head. Therapist was able to encourage patients to place a few things on his visions board. PT at one point stated, \" It's coming on now, I can feel it, I need to go back to my room\".       Status After Intervention:  Unchanged    Participation Level: Minimal    Participation Quality: Resistant      Speech:  normal and hesitant      Thought Process/Content: Logical      Affective Functioning: Congruent      Mood: anxious and dysphoric      Level of consciousness:  Preoccupied and Inattentive (preoccupied on empty feelings)       Response to Learning: Resistant      Endings: None Reported    Modes of Intervention: Socialization, Clarifying, and Activity      Recreational Therapist  KIRSTEN CLARK

## 2024-08-27 NOTE — GROUP NOTE
Group Therapy Note    Date: 8/27/2024    Group Start Time: 0930  Group End Time: 1015  Group Topic: Music Therapy      Toni Burger        Group Therapy Note    Attendees: 5/10    Group Focus: Music therapy group explored coping with depressive symptoms through marimar analysis and a live rendition of one of the songs utilized in group. Group members discussed peaceful/safe places they can go to reflect and also discussed interests for the future.       Notes:  During initial mood check-in, pt reported that he is \"here for depression which has gone away\" and then stated how he also deals with anxiety, then stated he did not know how he feels inside currently. Pt engaged in marimar discussion, discussing what the songs could be about. Pt discussed the ocean as a peaceful place for him.    Status After Intervention:  Unchanged    Participation Level: Interactive    Participation Quality: Appropriate, Attentive, and Sharing      Speech:  normal      Thought Process/Content: Logical      Affective Functioning: Congruent      Mood: Calm      Level of consciousness:  Alert and Attentive      Response to Learning: Able to verbalize current knowledge/experience      Endings: None Reported    Modes of Intervention: Support, Socialization, Exploration, and Media      Discipline Responsible: /Counselor      Signature:  LUCIAN Dickson, LPC  Board-Certified Music Therapist  Licensed Professional Counselor

## 2024-08-28 PROCEDURE — 6370000000 HC RX 637 (ALT 250 FOR IP): Performed by: PSYCHIATRY & NEUROLOGY

## 2024-08-28 PROCEDURE — 1240000000 HC EMOTIONAL WELLNESS R&B

## 2024-08-28 PROCEDURE — 99232 SBSQ HOSP IP/OBS MODERATE 35: CPT | Performed by: PSYCHIATRY & NEUROLOGY

## 2024-08-28 RX ADMIN — CLONAZEPAM 1 MG: 1 TABLET ORAL at 15:44

## 2024-08-28 RX ADMIN — CLONAZEPAM 1 MG: 1 TABLET ORAL at 20:16

## 2024-08-28 RX ADMIN — ATORVASTATIN CALCIUM 20 MG: 20 TABLET, FILM COATED ORAL at 20:15

## 2024-08-28 RX ADMIN — ASPIRIN 81 MG: 81 TABLET, COATED ORAL at 08:32

## 2024-08-28 RX ADMIN — DULOXETINE HYDROCHLORIDE 60 MG: 30 CAPSULE, DELAYED RELEASE ORAL at 08:31

## 2024-08-28 RX ADMIN — HYDROXYZINE HYDROCHLORIDE 50 MG: 50 TABLET, FILM COATED ORAL at 11:20

## 2024-08-28 RX ADMIN — HYDROXYZINE HYDROCHLORIDE 50 MG: 50 TABLET, FILM COATED ORAL at 18:19

## 2024-08-28 RX ADMIN — QUETIAPINE FUMARATE 100 MG: 100 TABLET ORAL at 20:15

## 2024-08-28 RX ADMIN — CLONAZEPAM 1 MG: 1 TABLET ORAL at 08:31

## 2024-08-28 RX ADMIN — DULOXETINE HYDROCHLORIDE 60 MG: 30 CAPSULE, DELAYED RELEASE ORAL at 20:16

## 2024-08-28 RX ADMIN — LAMOTRIGINE 150 MG: 100 TABLET ORAL at 20:16

## 2024-08-28 RX ADMIN — LAMOTRIGINE 150 MG: 100 TABLET ORAL at 08:31

## 2024-08-28 RX ADMIN — QUETIAPINE FUMARATE 100 MG: 100 TABLET ORAL at 08:31

## 2024-08-28 RX ADMIN — Medication 2000 UNITS: at 08:32

## 2024-08-28 RX ADMIN — AMLODIPINE BESYLATE 2.5 MG: 5 TABLET ORAL at 08:32

## 2024-08-28 RX ADMIN — AMITRIPTYLINE HYDROCHLORIDE 100 MG: 50 TABLET, FILM COATED ORAL at 20:15

## 2024-08-28 ASSESSMENT — PAIN SCALES - GENERAL: PAINLEVEL_OUTOF10: 0

## 2024-08-28 ASSESSMENT — PAIN SCALES - WONG BAKER: WONGBAKER_NUMERICALRESPONSE: NO HURT

## 2024-08-28 NOTE — GROUP NOTE
Group Therapy Note    Date: 8/28/2024    Group Start Time: 1510  Group End Time: 1600  Group Topic: Recreational    [facility]    Jolly Clark        Group Therapy Note    Attendees: 7/11    Group: Noemy    Focus: Recreational therapy group engaged patients through a group game. RT placed patients into two teams to encourage team building. RT used topics that focused on self-care, coping skills, and identifying emotions. The group can assist in increasing positive mood, self-care, social and problem-solving skills, and reduce stress.        Notes: Patient did not attend group.     Recreational Therapist   Jolly Clark

## 2024-08-28 NOTE — BH NOTE
Approximately at 0826 hours, patient verbally said to this writer \" I'm now feeling like I was yesterday\". The writer, then, respond to the patient and said \"Sir, what do you mean\"?And the patient said \" I feel like hurting someone now\". At the time, patient was sitting up in the bed while talking to this writer. After saying what was said, patient laid back down in the bed, positioning self with legs crossed and both hands over chest, while laying on back and breathing very heavy. Patient has been very anxious and shaking rapidly (suddenly stop). Additionally, patient consumed very little of morning meal (breakfast) and appeared back to room. Patient isolated self in room and has not socialized with anyone. At 0834 hours, Patient exhibited restlessness, and began shaking and making fists. Approximately at 1021 hours, patient was observed trying to make self vomit. During group session (art therapy), patient got up and left to go back to room and began crying impulsively, until cried self to sleep. Patient was compliant with daytime schedule meds and remains in a 1:1 with staff status.

## 2024-08-28 NOTE — GROUP NOTE
Group Therapy Note    Date: 8/28/2024    Group Start Time: 1500  Group End Time: 1550  Group Topic: Music Therapy      Toni Burger        Group Therapy Note    Attendees: 7/12    Group Focus: Music therapy group explored themes related to inner strength, resiliency, and self-encouragement through marimar analysis of two songs with similar themes. Therapist also provided some psychoeducation related to visualization and the mindfulness attitude of non-judgement.       Notes:  Pt attended about the second half of group. Pt discussed how one of the songs utilized in group \"used to be a good song\" to him and how he \"used to feel something in my heart \" with the song. Pt later stated the only things he had to say about it were the \"words on the page.\"     Status After Intervention:  Unchanged    Participation Level: Active Listener and Interactive    Participation Quality: Appropriate, Attentive, and Sharing      Speech:  normal      Thought Process/Content: Logical      Affective Functioning: Congruent      Mood: depressed      Level of consciousness:  Alert and Attentive      Response to Learning: Able to verbalize current knowledge/experience      Endings: None Reported    Modes of Intervention: Education, Support, Socialization, Exploration, and Media      Discipline Responsible: /Counselor      Signature:  LUCIAN Dickson, LPC  Board-Certified Music Therapist  Licensed Professional Counselor

## 2024-08-28 NOTE — BH NOTE
Patient was on the phone when staff arrived. Patient stated that he was fighting between GOD and the Devil. Patient stated

## 2024-08-28 NOTE — GROUP NOTE
Art Therapy Group Progress Note    PATIENT SCHEDULED FOR GROUP AT: 1:00 PM     GROUP STOP TIME:  1:45 PM    ATTENDANCE: Moderate (7/12 participants)     TOPIC / FOCUS: Create A Safe Space     GOALS:  practice guided meditation, increase feelings of comfort and support, decrease stress and anxiety, build resilience, practice self-care, promote positive thinking through affirmations, identify ways to create physical, mental, social, and emotional safety.     Notes:  Pt was not engaged or focused during the meditation. Pt caught this writer's attention during reading of meditation, as if they wanted to say something or ask a question. Pt was acknowledge, but this writer did not engage to prevent disruption to the other participants engaged in the meditation.     After the visualization was complete the Pt stated that they had not listened to the content, but just heard the sound of this writers voice reading. Pt was encouraged to connect with safety in the artwork. While group members were sharing the imagery that came up for them, the pt became tearful saying that they had thought of their mother. Pt then asked to be excused from group. Pt remained in their room for the rest of the group session.     Status After Intervention:  Unchanged    Participation Level: None    Participation Quality: Resistant      Speech:  normal    Affective Functioning: Blunted and Exaggerated at times       Mood: anxious and dysphoric      Level of consciousness:  Inattentive      Response to Learning: Resistant      Endings: None Reported    Modes of Intervention: Education, Support, Socialization, Exploration, Clarifying, and Activity      Discipline Responsible: Psychoeducational Specialist      Humza Nixon  Art Therapist, MA, ATR-P  Provisional Registered Art Therapist

## 2024-08-28 NOTE — H&P
Axis I: Major depressive disorder recurrent without psychotic symptoms.  Generalized anxiety disorder with panic-like symptoms.  Axis II: Noncontributory.  Axis III: Status post remote craniotomy for blood clot extraction.  (1964).  History of nonalcoholic liver disease currently with normal hepatic function tests.  History of hepatitis C without coma, chronic.  Hydrocele by history.  Dyslipidemia by history.  Chronic history of lumbar and cervical disc disease with secondary chronic pain.  Status post left rotator cuff surgical repair.  Status post left ulnar nerve transport.  Status post lumbar discectomy L4-L5.      Pt.'s case was discussed in staffing and treatment team. Pt continues to endorse thoughts to harm others and has been demonstrating self injurious behaviors such as banging head on door, knocking chair over and making statements about killing his spouse. The team discussed how pt.'s behaviors are getting worse. The team will assist pt by helping him reframe negative statements, engage pt with positive coping strategies and encouraged safety. Pt  stated he needs a release. Pt expressed he is not sure what has changed since he was last hospitalized. Pt was observed being anxious ( leg , shaking, closing fist while talking and had a underlying irritable affect). Pt was provided positive encouragement. Pt. Continues to have no insight. The team will continue to provide pt with supports.        Kathie Gonzales HS-BCP MA, LMHP-R

## 2024-08-29 PROCEDURE — 6370000000 HC RX 637 (ALT 250 FOR IP): Performed by: PSYCHIATRY & NEUROLOGY

## 2024-08-29 PROCEDURE — 99232 SBSQ HOSP IP/OBS MODERATE 35: CPT | Performed by: PSYCHIATRY & NEUROLOGY

## 2024-08-29 PROCEDURE — 1240000000 HC EMOTIONAL WELLNESS R&B

## 2024-08-29 RX ADMIN — Medication 2000 UNITS: at 07:42

## 2024-08-29 RX ADMIN — DULOXETINE HYDROCHLORIDE 60 MG: 30 CAPSULE, DELAYED RELEASE ORAL at 21:11

## 2024-08-29 RX ADMIN — ASPIRIN 81 MG: 81 TABLET, COATED ORAL at 07:42

## 2024-08-29 RX ADMIN — QUETIAPINE FUMARATE 100 MG: 100 TABLET ORAL at 07:42

## 2024-08-29 RX ADMIN — CLONAZEPAM 1 MG: 1 TABLET ORAL at 14:36

## 2024-08-29 RX ADMIN — LAMOTRIGINE 150 MG: 100 TABLET ORAL at 07:41

## 2024-08-29 RX ADMIN — AMITRIPTYLINE HYDROCHLORIDE 100 MG: 50 TABLET, FILM COATED ORAL at 21:11

## 2024-08-29 RX ADMIN — AMLODIPINE BESYLATE 2.5 MG: 5 TABLET ORAL at 07:42

## 2024-08-29 RX ADMIN — CLONAZEPAM 1 MG: 1 TABLET ORAL at 07:42

## 2024-08-29 RX ADMIN — DULOXETINE HYDROCHLORIDE 60 MG: 30 CAPSULE, DELAYED RELEASE ORAL at 07:42

## 2024-08-29 RX ADMIN — LAMOTRIGINE 150 MG: 100 TABLET ORAL at 21:11

## 2024-08-29 RX ADMIN — QUETIAPINE FUMARATE 150 MG: 100 TABLET ORAL at 21:12

## 2024-08-29 RX ADMIN — CLONAZEPAM 1 MG: 1 TABLET ORAL at 21:10

## 2024-08-29 RX ADMIN — ATORVASTATIN CALCIUM 20 MG: 20 TABLET, FILM COATED ORAL at 21:11

## 2024-08-29 ASSESSMENT — PAIN SCALES - WONG BAKER
WONGBAKER_NUMERICALRESPONSE: NO HURT
WONGBAKER_NUMERICALRESPONSE: NO HURT

## 2024-08-29 ASSESSMENT — PAIN SCALES - GENERAL
PAINLEVEL_OUTOF10: 0
PAINLEVEL_OUTOF10: 0

## 2024-08-29 NOTE — GROUP NOTE
Group Therapy Note    Date: 8/29/2024    Group Start Time: 0930  Group End Time: 1015  Group Topic: Music Therapy      Toni Burger        Group Therapy Note    Attendees: 6/13    Group Focus: Music therapy group explored the idea of group members' ideal worlds and things they can control to get one step closer toward their ideal world. The group utilized marimar analysis, and a written worksheet utilizing metaphor to explore these concepts. The group may promote values exploration, future-focused thinking, goal setting, and self-awareness.       Notes:  Pt did not attend group.      Discipline Responsible: /Counselor      Signature:  LUCIAN Dickson, LPC  Board-Certified Music Therapist  Licensed Professional Counselor

## 2024-08-29 NOTE — BH NOTE
LOYD Note: The above pt was sitting on the bed resting at this time. Handoff was given to the on-coming staff.

## 2024-08-29 NOTE — GROUP NOTE
Group Therapy Note    Date: 8/29/2024    Group Start Time: 1500  Group End Time: 1545  Group Topic: Recreational    [facility]    Jolly Clark        Group Therapy Note    Attendees: 5/12    Group: Stress Management   Focus: Recreational therapy group consisted of patients exploring the multi-faceted topics of stress, including its triggers, impacts on the body, and stress management strategies. This group may help reduce stress, anxiety, depression, and increase mood.       Notes: Patient did not attend group. Patient observed in group briefly and at a distance from other group members.     Recreational Therapist   Jolly Clark

## 2024-08-29 NOTE — GROUP NOTE
Art Therapy Group Progress Note    PATIENT SCHEDULED FOR GROUP AT: 1:00 PM     GROUP STOP TIME:  1:45 PM    ATTENDANCE: Moderate (6/13 participants)     TOPIC / FOCUS: Resiliency in Nature; symbolic self-portrait      GOALS:  identify images of resiliency in nature, define resiliency, explore personal experience of resiliency, identify ways to practice resiliency in daily life, identify effective coping skills, encourage creativity, promote self-expression    PARTICIPATION LEVEL:  Pt did not attend.     Humza Nixon  Art Therapist, MA, ATR-P  Provisional Registered Art Therapist

## 2024-08-30 PROCEDURE — 6370000000 HC RX 637 (ALT 250 FOR IP): Performed by: PSYCHIATRY & NEUROLOGY

## 2024-08-30 PROCEDURE — 99231 SBSQ HOSP IP/OBS SF/LOW 25: CPT | Performed by: PSYCHIATRY & NEUROLOGY

## 2024-08-30 PROCEDURE — 1240000000 HC EMOTIONAL WELLNESS R&B

## 2024-08-30 RX ADMIN — ASPIRIN 81 MG: 81 TABLET, COATED ORAL at 08:38

## 2024-08-30 RX ADMIN — AMLODIPINE BESYLATE 2.5 MG: 5 TABLET ORAL at 08:37

## 2024-08-30 RX ADMIN — CLONAZEPAM 1 MG: 1 TABLET ORAL at 08:37

## 2024-08-30 RX ADMIN — AMITRIPTYLINE HYDROCHLORIDE 100 MG: 50 TABLET, FILM COATED ORAL at 19:44

## 2024-08-30 RX ADMIN — Medication 2000 UNITS: at 08:37

## 2024-08-30 RX ADMIN — ATORVASTATIN CALCIUM 20 MG: 20 TABLET, FILM COATED ORAL at 19:43

## 2024-08-30 RX ADMIN — LAMOTRIGINE 150 MG: 100 TABLET ORAL at 20:43

## 2024-08-30 RX ADMIN — QUETIAPINE FUMARATE 150 MG: 100 TABLET ORAL at 08:37

## 2024-08-30 RX ADMIN — DULOXETINE HYDROCHLORIDE 60 MG: 30 CAPSULE, DELAYED RELEASE ORAL at 08:38

## 2024-08-30 RX ADMIN — DULOXETINE HYDROCHLORIDE 60 MG: 30 CAPSULE, DELAYED RELEASE ORAL at 19:44

## 2024-08-30 RX ADMIN — CLONAZEPAM 1 MG: 1 TABLET ORAL at 20:44

## 2024-08-30 RX ADMIN — QUETIAPINE FUMARATE 150 MG: 100 TABLET ORAL at 19:44

## 2024-08-30 RX ADMIN — LAMOTRIGINE 150 MG: 100 TABLET ORAL at 08:36

## 2024-08-30 ASSESSMENT — PAIN SCALES - GENERAL: PAINLEVEL_OUTOF10: 0

## 2024-08-30 NOTE — GROUP NOTE
Art Therapy Group Progress Note    PATIENT SCHEDULED FOR GROUP AT: 9:30 AM     GROUP STOP TIME:  10:45 AM    ATTENDANCE: Low (3/12 participants)     TOPIC / FOCUS: Mindfulness - Motivational Word Mandala     GOALS: define mindfulness, identify current feelings, encourage healthy emotional management, practicing positive coping skills, encourage focus, promote self-awareness, reduce stress and anxiety     PARTICIPATION LEVEL:  Pt did not attend.     Humza Nixon  Art Therapist, MA, ATR-P  Provisional Registered Art Therapist

## 2024-08-30 NOTE — GROUP NOTE
Group Therapy Note    Date: 8/30/2024    Group Start Time: 1300  Group End Time: 1350  Group Topic: Music Therapy      Toni Burger        Group Therapy Note    Attendees: 2/10    Group Focus: Music therapy group explored themes related to finding a free mind or peace of mind through marimar analysis. Group members discussed what it means to them to have a free mind and activities and practices that support peace of mind. The group continued with an ACT leaves on a stream music-assisted visualization to explore cognitive defusion, which may also promote relaxation.       Notes:  Pt did not attend group.      Discipline Responsible: /Counselor      Signature:  LUCIAN Dickson, LPC  Board-Certified Music Therapist  Licensed Professional Counselor

## 2024-08-30 NOTE — BH NOTE
Pt remained in room for majority of the evening. He did make a couple phone calls which seem to put him in a positive mood. Pt fell asleep around 2130. Will continue monitor throughout the night.

## 2024-08-30 NOTE — GROUP NOTE
Group Therapy Note    Date: 8/30/2024    Group Start Time: 1500  Group End Time: 1545  Group Topic: Recreational    [facility]    Jolly Clark        Group Therapy Note    Attendees: 4/13    Group: Self Care Plan   Focus: Patients developed a self-care plan that involved boosting emotional/spiritual, physical, and mental(mind) health. Patients also discussed what self-care looks like to them,the different types of self-care, supportive people, and things they want to accomplish. This group may help enhance focus, mindfulness, productivity, self-awareness, and decrease stress, anxiety, and depression.               Notes:Patient did not attend group. Patient having impulsive behavior/outburst during group, crying, yelling, and pacing the unit. Patient sat in tech chair in group area several times, staff continued to redirect pt. At one point therapist had to redirect patient to his room due to his outburst causing group members to become unfocused.     Recreational Therapist   Jolly Clark

## 2024-08-31 PROCEDURE — 6370000000 HC RX 637 (ALT 250 FOR IP): Performed by: PSYCHIATRY & NEUROLOGY

## 2024-08-31 PROCEDURE — 6360000002 HC RX W HCPCS

## 2024-08-31 PROCEDURE — 99231 SBSQ HOSP IP/OBS SF/LOW 25: CPT | Performed by: PSYCHIATRY & NEUROLOGY

## 2024-08-31 PROCEDURE — 1240000000 HC EMOTIONAL WELLNESS R&B

## 2024-08-31 RX ORDER — HALOPERIDOL 5 MG/ML
5 INJECTION INTRAMUSCULAR EVERY 8 HOURS PRN
Status: DISCONTINUED | OUTPATIENT
Start: 2024-08-31 | End: 2024-09-30 | Stop reason: HOSPADM

## 2024-08-31 RX ORDER — LORAZEPAM 2 MG/ML
2 INJECTION INTRAMUSCULAR EVERY 8 HOURS PRN
Status: DISCONTINUED | OUTPATIENT
Start: 2024-08-31 | End: 2024-09-30 | Stop reason: HOSPADM

## 2024-08-31 RX ORDER — LORAZEPAM 2 MG/ML
INJECTION INTRAMUSCULAR
Status: COMPLETED
Start: 2024-08-31 | End: 2024-08-31

## 2024-08-31 RX ORDER — LORAZEPAM 2 MG/ML
2 INJECTION INTRAMUSCULAR ONCE
Status: COMPLETED | OUTPATIENT
Start: 2024-08-31 | End: 2024-08-31

## 2024-08-31 RX ORDER — HALOPERIDOL 5 MG/ML
5 INJECTION INTRAMUSCULAR ONCE
Status: COMPLETED | OUTPATIENT
Start: 2024-08-31 | End: 2024-08-31

## 2024-08-31 RX ORDER — HALOPERIDOL 5 MG/ML
INJECTION INTRAMUSCULAR
Status: COMPLETED
Start: 2024-08-31 | End: 2024-08-31

## 2024-08-31 RX ADMIN — HALOPERIDOL 5 MG: 5 INJECTION INTRAMUSCULAR at 09:20

## 2024-08-31 RX ADMIN — ATORVASTATIN CALCIUM 20 MG: 20 TABLET, FILM COATED ORAL at 20:32

## 2024-08-31 RX ADMIN — Medication 2000 UNITS: at 08:15

## 2024-08-31 RX ADMIN — LORAZEPAM 2 MG: 2 INJECTION INTRAMUSCULAR at 09:19

## 2024-08-31 RX ADMIN — HALOPERIDOL LACTATE 5 MG: 5 INJECTION, SOLUTION INTRAMUSCULAR at 09:20

## 2024-08-31 RX ADMIN — DULOXETINE HYDROCHLORIDE 60 MG: 30 CAPSULE, DELAYED RELEASE ORAL at 08:14

## 2024-08-31 RX ADMIN — QUETIAPINE FUMARATE 150 MG: 100 TABLET ORAL at 08:14

## 2024-08-31 RX ADMIN — LORAZEPAM 2 MG: 2 INJECTION INTRAMUSCULAR; INTRAVENOUS at 09:19

## 2024-08-31 RX ADMIN — HYDROXYZINE HYDROCHLORIDE 50 MG: 50 TABLET, FILM COATED ORAL at 08:14

## 2024-08-31 RX ADMIN — CLONAZEPAM 1 MG: 1 TABLET ORAL at 08:14

## 2024-08-31 RX ADMIN — LAMOTRIGINE 150 MG: 100 TABLET ORAL at 08:14

## 2024-08-31 RX ADMIN — LORAZEPAM 1 MG: 1 TABLET ORAL at 16:49

## 2024-08-31 RX ADMIN — LAMOTRIGINE 150 MG: 100 TABLET ORAL at 20:32

## 2024-08-31 RX ADMIN — QUETIAPINE FUMARATE 150 MG: 100 TABLET ORAL at 20:32

## 2024-08-31 RX ADMIN — HYDROXYZINE HYDROCHLORIDE 50 MG: 50 TABLET, FILM COATED ORAL at 16:49

## 2024-08-31 RX ADMIN — AMITRIPTYLINE HYDROCHLORIDE 100 MG: 50 TABLET, FILM COATED ORAL at 20:32

## 2024-08-31 RX ADMIN — AMLODIPINE BESYLATE 2.5 MG: 5 TABLET ORAL at 08:15

## 2024-08-31 RX ADMIN — ASPIRIN 81 MG: 81 TABLET, COATED ORAL at 08:15

## 2024-08-31 RX ADMIN — CLONAZEPAM 1 MG: 1 TABLET ORAL at 20:32

## 2024-08-31 RX ADMIN — DULOXETINE HYDROCHLORIDE 60 MG: 30 CAPSULE, DELAYED RELEASE ORAL at 20:32

## 2024-08-31 RX ADMIN — CLONAZEPAM 1 MG: 1 TABLET ORAL at 14:32

## 2024-08-31 NOTE — BH NOTE
At the beginning of the shift pt was quite anxious and displayed psychomotor agitation.  Staff encouraged pt to use his coping skills to help calm himself down.  After pt received his hs scheduled medications pt was able to calm down and rest.  Pt denied si/hi and avh, but continued to ruminate on negative feelings of low self worth and \"feeling nothing\".  Staff was able to help pt process his feelings.  Pt is 1:1 w/ staff at all times d/t hx of self injurious behaviors during this hospitalization. Will continue to monitor/support

## 2024-09-01 PROCEDURE — 6370000000 HC RX 637 (ALT 250 FOR IP): Performed by: PSYCHIATRY & NEUROLOGY

## 2024-09-01 PROCEDURE — 99231 SBSQ HOSP IP/OBS SF/LOW 25: CPT | Performed by: PSYCHIATRY & NEUROLOGY

## 2024-09-01 PROCEDURE — 1240000000 HC EMOTIONAL WELLNESS R&B

## 2024-09-01 RX ORDER — QUETIAPINE FUMARATE 100 MG/1
200 TABLET, FILM COATED ORAL 2 TIMES DAILY
Status: DISCONTINUED | OUTPATIENT
Start: 2024-09-01 | End: 2024-09-04

## 2024-09-01 RX ADMIN — QUETIAPINE FUMARATE 200 MG: 100 TABLET ORAL at 20:28

## 2024-09-01 RX ADMIN — DULOXETINE HYDROCHLORIDE 60 MG: 30 CAPSULE, DELAYED RELEASE ORAL at 08:54

## 2024-09-01 RX ADMIN — ASPIRIN 81 MG: 81 TABLET, COATED ORAL at 08:53

## 2024-09-01 RX ADMIN — DULOXETINE HYDROCHLORIDE 60 MG: 30 CAPSULE, DELAYED RELEASE ORAL at 20:28

## 2024-09-01 RX ADMIN — CLONAZEPAM 1 MG: 1 TABLET ORAL at 08:54

## 2024-09-01 RX ADMIN — Medication 2000 UNITS: at 08:53

## 2024-09-01 RX ADMIN — LAMOTRIGINE 150 MG: 100 TABLET ORAL at 20:27

## 2024-09-01 RX ADMIN — HYDROXYZINE HYDROCHLORIDE 50 MG: 50 TABLET, FILM COATED ORAL at 08:54

## 2024-09-01 RX ADMIN — ATORVASTATIN CALCIUM 20 MG: 20 TABLET, FILM COATED ORAL at 20:28

## 2024-09-01 RX ADMIN — CLONAZEPAM 1 MG: 1 TABLET ORAL at 15:51

## 2024-09-01 RX ADMIN — AMLODIPINE BESYLATE 2.5 MG: 5 TABLET ORAL at 08:54

## 2024-09-01 RX ADMIN — HYDROXYZINE HYDROCHLORIDE 50 MG: 50 TABLET, FILM COATED ORAL at 15:52

## 2024-09-01 RX ADMIN — QUETIAPINE FUMARATE 150 MG: 100 TABLET ORAL at 08:54

## 2024-09-01 RX ADMIN — AMITRIPTYLINE HYDROCHLORIDE 100 MG: 50 TABLET, FILM COATED ORAL at 20:28

## 2024-09-01 RX ADMIN — LAMOTRIGINE 150 MG: 100 TABLET ORAL at 08:53

## 2024-09-01 RX ADMIN — CLONAZEPAM 1 MG: 1 TABLET ORAL at 20:28

## 2024-09-01 NOTE — BH NOTE
Pt bite himself. When staff asked why he said, \"it helps relieve my pain\". Staff told him that biting himself will not help relieve his pain and that he needs to stop. He said ok.
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
## 2024-09-01 NOTE — BH NOTE
Patient noted to have slept for approximately 7.5 hours. Staff will continue to monitor patient on 1:1 precautions.

## 2024-09-01 NOTE — BH NOTE
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 Pt talked with staff in his room about different things that have happened in his life. Pt started to get anxious and staff got him to calm back down. Staff let the RN know. Shortly after that pt stated \"It's coming again, I can't control it.\" Staff got him calmed down a little but then he started pulling his hair and continued to shake his legs. He said, \"The anxiety won't go away\".

## 2024-09-02 PROCEDURE — 6370000000 HC RX 637 (ALT 250 FOR IP)

## 2024-09-02 PROCEDURE — 1240000000 HC EMOTIONAL WELLNESS R&B

## 2024-09-02 PROCEDURE — 6370000000 HC RX 637 (ALT 250 FOR IP): Performed by: PSYCHIATRY & NEUROLOGY

## 2024-09-02 PROCEDURE — 99232 SBSQ HOSP IP/OBS MODERATE 35: CPT | Performed by: PSYCHIATRY & NEUROLOGY

## 2024-09-02 RX ORDER — COLCHICINE 0.6 MG/1
0.6 CAPSULE ORAL 2 TIMES DAILY PRN
Status: DISCONTINUED | OUTPATIENT
Start: 2024-09-02 | End: 2024-09-30 | Stop reason: HOSPADM

## 2024-09-02 RX ADMIN — ASPIRIN 81 MG: 81 TABLET, COATED ORAL at 08:04

## 2024-09-02 RX ADMIN — Medication 2000 UNITS: at 08:04

## 2024-09-02 RX ADMIN — QUETIAPINE FUMARATE 200 MG: 100 TABLET ORAL at 08:04

## 2024-09-02 RX ADMIN — DULOXETINE HYDROCHLORIDE 60 MG: 30 CAPSULE, DELAYED RELEASE ORAL at 20:08

## 2024-09-02 RX ADMIN — DULOXETINE HYDROCHLORIDE 60 MG: 30 CAPSULE, DELAYED RELEASE ORAL at 08:04

## 2024-09-02 RX ADMIN — COLCHICINE 0.6 MG: 0.6 CAPSULE ORAL at 15:20

## 2024-09-02 RX ADMIN — LAMOTRIGINE 150 MG: 100 TABLET ORAL at 21:00

## 2024-09-02 RX ADMIN — LAMOTRIGINE 150 MG: 100 TABLET ORAL at 08:03

## 2024-09-02 RX ADMIN — INDOMETHACIN 50 MG: 25 CAPSULE ORAL at 12:18

## 2024-09-02 RX ADMIN — ATORVASTATIN CALCIUM 20 MG: 20 TABLET, FILM COATED ORAL at 20:08

## 2024-09-02 RX ADMIN — AMLODIPINE BESYLATE 2.5 MG: 5 TABLET ORAL at 08:04

## 2024-09-02 RX ADMIN — CLONAZEPAM 1 MG: 1 TABLET ORAL at 15:20

## 2024-09-02 RX ADMIN — AMITRIPTYLINE HYDROCHLORIDE 100 MG: 50 TABLET, FILM COATED ORAL at 20:07

## 2024-09-02 RX ADMIN — QUETIAPINE FUMARATE 200 MG: 100 TABLET ORAL at 20:07

## 2024-09-02 RX ADMIN — CLONAZEPAM 1 MG: 1 TABLET ORAL at 21:00

## 2024-09-02 RX ADMIN — CLONAZEPAM 1 MG: 1 TABLET ORAL at 08:03

## 2024-09-02 ASSESSMENT — PAIN DESCRIPTION - ORIENTATION
ORIENTATION: LEFT
ORIENTATION: LEFT

## 2024-09-02 ASSESSMENT — PAIN SCALES - GENERAL
PAINLEVEL_OUTOF10: 5
PAINLEVEL_OUTOF10: 4
PAINLEVEL_OUTOF10: 1
PAINLEVEL_OUTOF10: 0

## 2024-09-02 ASSESSMENT — PAIN - FUNCTIONAL ASSESSMENT: PAIN_FUNCTIONAL_ASSESSMENT: PREVENTS OR INTERFERES SOME ACTIVE ACTIVITIES AND ADLS

## 2024-09-02 ASSESSMENT — PAIN DESCRIPTION - LOCATION
LOCATION: FOOT
LOCATION: FOOT

## 2024-09-03 PROCEDURE — 99232 SBSQ HOSP IP/OBS MODERATE 35: CPT | Performed by: PSYCHIATRY & NEUROLOGY

## 2024-09-03 PROCEDURE — 6370000000 HC RX 637 (ALT 250 FOR IP): Performed by: PSYCHIATRY & NEUROLOGY

## 2024-09-03 PROCEDURE — 1240000000 HC EMOTIONAL WELLNESS R&B

## 2024-09-03 RX ADMIN — CLONAZEPAM 1 MG: 1 TABLET ORAL at 21:00

## 2024-09-03 RX ADMIN — QUETIAPINE FUMARATE 200 MG: 100 TABLET ORAL at 20:15

## 2024-09-03 RX ADMIN — Medication 2000 UNITS: at 08:25

## 2024-09-03 RX ADMIN — LAMOTRIGINE 150 MG: 100 TABLET ORAL at 08:22

## 2024-09-03 RX ADMIN — CLONAZEPAM 1 MG: 1 TABLET ORAL at 15:04

## 2024-09-03 RX ADMIN — CLONAZEPAM 1 MG: 1 TABLET ORAL at 08:25

## 2024-09-03 RX ADMIN — ACETAMINOPHEN 325MG 650 MG: 325 TABLET ORAL at 08:24

## 2024-09-03 RX ADMIN — ASPIRIN 81 MG: 81 TABLET, COATED ORAL at 08:25

## 2024-09-03 RX ADMIN — DULOXETINE HYDROCHLORIDE 60 MG: 30 CAPSULE, DELAYED RELEASE ORAL at 20:14

## 2024-09-03 RX ADMIN — LAMOTRIGINE 150 MG: 100 TABLET ORAL at 21:00

## 2024-09-03 RX ADMIN — QUETIAPINE FUMARATE 200 MG: 100 TABLET ORAL at 08:26

## 2024-09-03 RX ADMIN — AMLODIPINE BESYLATE 2.5 MG: 5 TABLET ORAL at 08:25

## 2024-09-03 RX ADMIN — AMITRIPTYLINE HYDROCHLORIDE 100 MG: 50 TABLET, FILM COATED ORAL at 20:15

## 2024-09-03 RX ADMIN — DULOXETINE HYDROCHLORIDE 60 MG: 30 CAPSULE, DELAYED RELEASE ORAL at 08:25

## 2024-09-03 RX ADMIN — ATORVASTATIN CALCIUM 20 MG: 20 TABLET, FILM COATED ORAL at 20:15

## 2024-09-03 ASSESSMENT — PAIN DESCRIPTION - LOCATION
LOCATION: FINGER (COMMENT WHICH ONE)
LOCATION: FINGER (COMMENT WHICH ONE)

## 2024-09-03 ASSESSMENT — PAIN - FUNCTIONAL ASSESSMENT
PAIN_FUNCTIONAL_ASSESSMENT: ACTIVITIES ARE NOT PREVENTED
PAIN_FUNCTIONAL_ASSESSMENT: ACTIVITIES ARE NOT PREVENTED

## 2024-09-03 ASSESSMENT — PAIN SCALES - GENERAL
PAINLEVEL_OUTOF10: 4
PAINLEVEL_OUTOF10: 0
PAINLEVEL_OUTOF10: 4
PAINLEVEL_OUTOF10: 0

## 2024-09-03 ASSESSMENT — PAIN DESCRIPTION - ORIENTATION
ORIENTATION: RIGHT
ORIENTATION: LEFT

## 2024-09-03 ASSESSMENT — PAIN SCALES - WONG BAKER
WONGBAKER_NUMERICALRESPONSE: NO HURT
WONGBAKER_NUMERICALRESPONSE: NO HURT

## 2024-09-03 ASSESSMENT — PAIN DESCRIPTION - DESCRIPTORS: DESCRIPTORS: ACHING;DISCOMFORT

## 2024-09-03 NOTE — GROUP NOTE
Art Therapy Group Progress Note    PATIENT SCHEDULED FOR GROUP AT: 1:00 PM     GROUP STOP TIME:  1:45 PM    ATTENDANCE: Low (5/12 participants)     TOPIC / FOCUS:  Self-Care Bucket     GOALS:  define self-care, identify ways to practice self-care, define burn out, identify ways that encourage burnout, identify positive coping skills to decrease burnout, increase self-awareness, encourage emotional awareness, promote creativity and creative problem solving, encourage meeting basic self-care goals     PARTICIPATION LEVEL:  Pt did not attend.     Humza Nixon  Art Therapist, MA, ATR-P  Provisional Registered Art Therapist     Pt entered day area for the last few minutes of group. Pt was encouraged to participate at shared in discussion briefly. Pt shared that they listened to Shinto radio programming and enjoyed fishing. Pt was provided with the worksheet used during group.

## 2024-09-03 NOTE — GROUP NOTE
Group Therapy Note    Date: 9/3/2024    Group Start Time: 0930  Group End Time: 1015  Group Topic: Music Therapy    Toni Burger        Group Therapy Note    Attendees: 10/16    Group Focus: Music therapy group consisted of a mindfulness-based music listening exercise. Therapist provided psychoeducation on mindfulness and how to utilize music as a form of mindfulness. Group members listened, wrote, and discussed what they noticed in five songs from differing genres. Group members discussed instruments and other musical elements, emotions in the music, thoughts, associations with music, and body sensations. The group may promote self-awareness and use of music and mindfulness as coping skills.       Notes:  Pt reported having a \"rough night\" at the start of group and mentioned being in the hospital for depression and anxiety. Pt engaged in group through asking questions, and writing minimally on handout. Pt seemed to not fully comprehend group topic at times based on questions pt asked. Pt discussed how he could not spell.     Status After Intervention:  Unchanged    Participation Level: Interactive and Minimal    Participation Quality: Appropriate and Attentive      Speech:  normal      Thought Process/Content: Logical      Affective Functioning: Blunted      Mood: depressed      Level of consciousness:  Alert and Attentive      Response to Learning: Able to verbalize current knowledge/experience and Resistant      Endings: None Reported    Modes of Intervention: Education, Support, Socialization, Exploration, and Media      Discipline Responsible: /Counselor      Signature:  LUCIAN Dickson, LPC  Board-Certified Music Therapist  Licensed Professional Counselor

## 2024-09-03 NOTE — GROUP NOTE
Group Therapy Note    Date: 9/3/2024    Group Start Time: 1510  Group End Time: 1600  Group Topic: Recreational    [facility]    Jolly Clark        Group Therapy Note    Attendees: 5/9    Group: Stress Bingo    Focus: Recreational therapy groups engaged patients through a game that focused on stress. Patients examined: external stressors, internal stressors, physical stress symptoms, emotional/behavioral stress symptoms, and stress relievers. Group may help enhance social and coping skills, and decrease stress, depression and anxiety        Notes: Patient did not attend group.     Recreational Therapist   Jolly Clark

## 2024-09-04 PROCEDURE — 99232 SBSQ HOSP IP/OBS MODERATE 35: CPT | Performed by: PSYCHIATRY & NEUROLOGY

## 2024-09-04 PROCEDURE — 6370000000 HC RX 637 (ALT 250 FOR IP): Performed by: PSYCHIATRY & NEUROLOGY

## 2024-09-04 PROCEDURE — 1240000000 HC EMOTIONAL WELLNESS R&B

## 2024-09-04 RX ADMIN — CLONAZEPAM 1 MG: 1 TABLET ORAL at 07:45

## 2024-09-04 RX ADMIN — DULOXETINE HYDROCHLORIDE 60 MG: 30 CAPSULE, DELAYED RELEASE ORAL at 07:45

## 2024-09-04 RX ADMIN — LAMOTRIGINE 150 MG: 100 TABLET ORAL at 20:21

## 2024-09-04 RX ADMIN — AMLODIPINE BESYLATE 2.5 MG: 5 TABLET ORAL at 07:46

## 2024-09-04 RX ADMIN — LORAZEPAM 1 MG: 1 TABLET ORAL at 17:47

## 2024-09-04 RX ADMIN — LAMOTRIGINE 150 MG: 100 TABLET ORAL at 07:45

## 2024-09-04 RX ADMIN — CLONAZEPAM 1 MG: 1 TABLET ORAL at 14:37

## 2024-09-04 RX ADMIN — ATORVASTATIN CALCIUM 20 MG: 20 TABLET, FILM COATED ORAL at 21:09

## 2024-09-04 RX ADMIN — CLONAZEPAM 1 MG: 1 TABLET ORAL at 20:21

## 2024-09-04 RX ADMIN — AMITRIPTYLINE HYDROCHLORIDE 100 MG: 50 TABLET, FILM COATED ORAL at 20:21

## 2024-09-04 RX ADMIN — QUETIAPINE FUMARATE 200 MG: 100 TABLET ORAL at 07:45

## 2024-09-04 RX ADMIN — Medication 2000 UNITS: at 07:45

## 2024-09-04 RX ADMIN — DULOXETINE HYDROCHLORIDE 60 MG: 30 CAPSULE, DELAYED RELEASE ORAL at 20:21

## 2024-09-04 RX ADMIN — ASPIRIN 81 MG: 81 TABLET, COATED ORAL at 07:45

## 2024-09-04 ASSESSMENT — PAIN SCALES - GENERAL
PAINLEVEL_OUTOF10: 0
PAINLEVEL_OUTOF10: 0

## 2024-09-04 NOTE — GROUP NOTE
Group Therapy Note    Date: 9/4/2024    Group Start Time: 1400  Group End Time: 1445  Group Topic: Music Therapy      Toni Burger        Group Therapy Note    Attendees: 4/9    Group Focus: Music therapy group explored the theme of self-compassion through marimar analysis and a one-word marimar replacement technique. The group discussed negative self-talk and applying self-compassion. The group closed with a live rendition of the song discussed with instruments to support the group process and further reflection.       Notes:  Pt did not attend group.      Discipline Responsible: /Counselor      Signature:  LUCIAN Dickson, LPC  Board-Certified Music Therapist  Licensed Professional Counselor     Dr. Zacarias  Post-Operative Instructions    1. Take your prescribed medication as needed. You can take ibuprofen in between doses of the narcotic if needed.   2. Upon arrival at home, lie down and elevate your surgical foot on 2 pillows.  3. Remain quiet, off your feet as much as possible, for the first 24-48 hours. This is when your feet first swell and may become painful. After 48 hours you may begin limited walking following these restrictions:weight bearing as tolerated in a surgical shoe      4. Drink large quantities of water. Consume no alcohol. Continue a well-balanced diet.  5. Report any unusual discomfort or fever to this office.  6. A limited amount of discomfort and swelling is to be expected. In some cases the skin may take on a bruised appearance. The surgical solution that was applied to your foot prior to the operation is dark in color and the operation site may appear to be oozing when it actually is not.  7. A slight amount of blood is to be expected, and is no cause for alarm. Do not remove the dressings. If there is active bleeding and if the bleeding persists, add additional gauze to the bandage, apply direct pressure, elevate your feet and call this office.  8. Do not get the dressings wet. As regular bathing may be inconvenient, sponge baths are recommended. If you shower, make sure the dressing stays dry.   9. When anesthesia wears off and if any discomfort should be present, apply an ice pack directly over the operated area for 15 minute intervals for several hours or until the pain leaves. (USE IN EXCESS OF 15 MINUTES COULD CAUSE FROSTBITE). Do not use hot water bags or electric pads. A convenient icepack can be made by placing ice cubes in a plastic bag and covering this with a towel.  10. If necessary, take a mild laxative before retiring.  11. Wear your special open shoes anytime you put weight on your foot, even if it is just to walk to the bathroom and back. It will probably be 2 or  3 weeks before you will be permitted to try regular shoes.  12. Having performed the operation, we are interested in a prompt recovery. Please cooperate by following the above instructions.  13. Please call to confirm your post-op appointment or call with any other questions.

## 2024-09-04 NOTE — GROUP NOTE
Group Therapy Note    Date: 9/4/2024    Group Start Time: 1500  Group End Time: 1545  Group Topic: Recreational    [facility]    Kirsten Clark        Group Therapy Note    Attendees: 2/9    Group: Game/Trouble    Focus: Recreational therapy group engaged patients through a table game and listening to requested songs from patients. Group members also engaged in conversation that focused on memory reflecting, self-care and leisure activities they find/once found fulfilling. This group may help boost serotonin, relieve symptoms of anxiety, stress, depression, and increase leisure education.              Notes: Patient was engaged and shared. Patient shared his \"safe place\" is home. Patient expressed wanting to change himself but not knowing what to change. Patient discussed how he felt he was not playing the game correctly, after therapist had assured him, he was. Patient requested for a sheet of paper at the end of group, shared he wanted to \"test\" himself to see how much he has forgotten.      Status After Intervention:  Unchanged    Participation Level: Active Listener and Interactive    Participation Quality: Appropriate, Attentive, and Sharing      Speech:  normal and hesitant      Thought Process/Content: Logical      Affective Functioning: Flat      Mood: anxious and euthymic      Level of consciousness:  Alert and Attentive      Response to Learning: Able to verbalize current knowledge/experience      Endings: None Reported    Modes of Intervention: Socialization and Activity      Recreational Therapist  KIRSTEN CLARK

## 2024-09-04 NOTE — GROUP NOTE
Art Therapy Group Progress Note    PATIENT SCHEDULED FOR GROUP AT: 1:00 PM     GROUP STOP TIME:  1:45 PM    ATTENDANCE: Low (1/9 participants)     TOPIC / FOCUS: Mask - how others see you vs. internal experience     GOALS:  increasing emotional awareness, exploring identity and social expectations, DBT skills/ Dialects, encourage self-expression, promote effective coping skills, practice emotional communication, increase self-awareness, psychoeducation on safety    PARTICIPATION LEVEL:  Pt did not attend.     Humza Nixon  Art Therapist, MA, ATR-P  Provisional Registered Art Therapist

## 2024-09-05 PROCEDURE — 6370000000 HC RX 637 (ALT 250 FOR IP): Performed by: PSYCHIATRY & NEUROLOGY

## 2024-09-05 PROCEDURE — 1240000000 HC EMOTIONAL WELLNESS R&B

## 2024-09-05 PROCEDURE — 99233 SBSQ HOSP IP/OBS HIGH 50: CPT | Performed by: PSYCHIATRY & NEUROLOGY

## 2024-09-05 PROCEDURE — 2580000003 HC RX 258: Performed by: PSYCHIATRY & NEUROLOGY

## 2024-09-05 PROCEDURE — 6360000002 HC RX W HCPCS: Performed by: PSYCHIATRY & NEUROLOGY

## 2024-09-05 RX ORDER — WATER 10 ML/10ML
1.2 INJECTION INTRAMUSCULAR; INTRAVENOUS; SUBCUTANEOUS ONCE
Status: COMPLETED | OUTPATIENT
Start: 2024-09-05 | End: 2024-09-05

## 2024-09-05 RX ORDER — ZIPRASIDONE MESYLATE 20 MG/ML
20 INJECTION, POWDER, LYOPHILIZED, FOR SOLUTION INTRAMUSCULAR ONCE
Status: COMPLETED | OUTPATIENT
Start: 2024-09-05 | End: 2024-09-05

## 2024-09-05 RX ORDER — MIRTAZAPINE 15 MG/1
15 TABLET, FILM COATED ORAL NIGHTLY
Status: DISCONTINUED | OUTPATIENT
Start: 2024-09-05 | End: 2024-09-06

## 2024-09-05 RX ADMIN — AMLODIPINE BESYLATE 2.5 MG: 5 TABLET ORAL at 07:38

## 2024-09-05 RX ADMIN — LORAZEPAM 1 MG: 1 TABLET ORAL at 13:59

## 2024-09-05 RX ADMIN — CLONAZEPAM 1 MG: 1 TABLET ORAL at 14:51

## 2024-09-05 RX ADMIN — ATORVASTATIN CALCIUM 20 MG: 20 TABLET, FILM COATED ORAL at 19:59

## 2024-09-05 RX ADMIN — HALOPERIDOL LACTATE 5 MG: 5 INJECTION, SOLUTION INTRAMUSCULAR at 14:23

## 2024-09-05 RX ADMIN — QUETIAPINE FUMARATE 400 MG: 300 TABLET ORAL at 19:58

## 2024-09-05 RX ADMIN — LORAZEPAM 2 MG: 2 INJECTION INTRAMUSCULAR; INTRAVENOUS at 15:16

## 2024-09-05 RX ADMIN — ASPIRIN 81 MG: 81 TABLET, COATED ORAL at 07:38

## 2024-09-05 RX ADMIN — DULOXETINE HYDROCHLORIDE 60 MG: 30 CAPSULE, DELAYED RELEASE ORAL at 19:58

## 2024-09-05 RX ADMIN — CLONAZEPAM 1 MG: 1 TABLET ORAL at 07:38

## 2024-09-05 RX ADMIN — HYDROXYZINE HYDROCHLORIDE 50 MG: 50 TABLET, FILM COATED ORAL at 10:21

## 2024-09-05 RX ADMIN — MIRTAZAPINE 15 MG: 15 TABLET, FILM COATED ORAL at 20:00

## 2024-09-05 RX ADMIN — LAMOTRIGINE 150 MG: 100 TABLET ORAL at 21:00

## 2024-09-05 RX ADMIN — Medication 2000 UNITS: at 07:37

## 2024-09-05 RX ADMIN — DULOXETINE HYDROCHLORIDE 60 MG: 30 CAPSULE, DELAYED RELEASE ORAL at 07:38

## 2024-09-05 RX ADMIN — WATER 1.2 ML: 1 INJECTION INTRAMUSCULAR; INTRAVENOUS; SUBCUTANEOUS at 15:44

## 2024-09-05 RX ADMIN — COLCHICINE 0.6 MG: 0.6 CAPSULE ORAL at 19:58

## 2024-09-05 RX ADMIN — ZIPRASIDONE MESYLATE 20 MG: 20 INJECTION, POWDER, LYOPHILIZED, FOR SOLUTION INTRAMUSCULAR at 15:44

## 2024-09-05 RX ADMIN — CLONAZEPAM 1 MG: 1 TABLET ORAL at 21:00

## 2024-09-05 RX ADMIN — LAMOTRIGINE 150 MG: 100 TABLET ORAL at 07:38

## 2024-09-05 ASSESSMENT — PAIN DESCRIPTION - DESCRIPTORS: DESCRIPTORS: ACHING

## 2024-09-05 ASSESSMENT — PAIN SCALES - GENERAL
PAINLEVEL_OUTOF10: 0
PAINLEVEL_OUTOF10: 10
PAINLEVEL_OUTOF10: 0

## 2024-09-05 ASSESSMENT — PAIN DESCRIPTION - LOCATION: LOCATION: FOOT

## 2024-09-05 ASSESSMENT — PAIN DESCRIPTION - ORIENTATION: ORIENTATION: LEFT

## 2024-09-05 NOTE — GROUP NOTE
Group Therapy Note    Date: 9/5/2024    Group Start Time: 0930  Group End Time: 1030  Group Topic: Music Therapy      Toni Burger        Group Therapy Note    Attendees: 2/10    Group Focus: Music therapy group explored healthy and unhealthy coping skills through the intervention of marimar analysis. Therapist also provided psychoeducation on deep breathing techniques, and group members explored several breathing techniques with and without music. The group may promote stress reduction, self-expression, and use of music and breathing techniques as coping skills.       Notes:  Pt did not attend group. Pt was sitting on the bathroom floor when invited to group and declined to attend the group. After group ended, pt continued to present as anxious.      Discipline Responsible: /Counselor      Signature:  LUCIAN Dickson, LPC  Board-Certified Music Therapist  Licensed Professional Counselor

## 2024-09-05 NOTE — GROUP NOTE
Group Therapy Note    Date: 9/5/2024    Group Start Time: 1430  Group End Time: 1500  Group Topic: Psychoeducation      Haley Martinez        Group Therapy Note    Attendees: 2  Group Topic: Recovery is Possible/Exploring Change       Patient declined to participate      Discipline Responsible: /Counselor      Signature:  Haley Martinez

## 2024-09-06 PROCEDURE — 6370000000 HC RX 637 (ALT 250 FOR IP): Performed by: PSYCHIATRY & NEUROLOGY

## 2024-09-06 PROCEDURE — 1240000000 HC EMOTIONAL WELLNESS R&B

## 2024-09-06 PROCEDURE — 99233 SBSQ HOSP IP/OBS HIGH 50: CPT | Performed by: PSYCHIATRY & NEUROLOGY

## 2024-09-06 RX ORDER — MIRTAZAPINE 15 MG/1
30 TABLET, FILM COATED ORAL NIGHTLY
Status: DISCONTINUED | OUTPATIENT
Start: 2024-09-06 | End: 2024-09-11

## 2024-09-06 RX ADMIN — DULOXETINE HYDROCHLORIDE 60 MG: 30 CAPSULE, DELAYED RELEASE ORAL at 07:56

## 2024-09-06 RX ADMIN — CLONAZEPAM 1 MG: 1 TABLET ORAL at 15:21

## 2024-09-06 RX ADMIN — LAMOTRIGINE 150 MG: 100 TABLET ORAL at 07:56

## 2024-09-06 RX ADMIN — ATORVASTATIN CALCIUM 20 MG: 20 TABLET, FILM COATED ORAL at 19:40

## 2024-09-06 RX ADMIN — ASPIRIN 81 MG: 81 TABLET, COATED ORAL at 07:56

## 2024-09-06 RX ADMIN — CLONAZEPAM 1 MG: 1 TABLET ORAL at 19:40

## 2024-09-06 RX ADMIN — LAMOTRIGINE 150 MG: 100 TABLET ORAL at 19:39

## 2024-09-06 RX ADMIN — Medication 2000 UNITS: at 07:56

## 2024-09-06 RX ADMIN — MIRTAZAPINE 30 MG: 15 TABLET, FILM COATED ORAL at 19:39

## 2024-09-06 RX ADMIN — AMLODIPINE BESYLATE 2.5 MG: 5 TABLET ORAL at 07:56

## 2024-09-06 RX ADMIN — DULOXETINE HYDROCHLORIDE 60 MG: 30 CAPSULE, DELAYED RELEASE ORAL at 19:40

## 2024-09-06 RX ADMIN — QUETIAPINE FUMARATE 400 MG: 300 TABLET ORAL at 19:40

## 2024-09-06 RX ADMIN — CLONAZEPAM 1 MG: 1 TABLET ORAL at 07:56

## 2024-09-06 RX ADMIN — HYDROXYZINE HYDROCHLORIDE 50 MG: 50 TABLET, FILM COATED ORAL at 18:35

## 2024-09-06 ASSESSMENT — PAIN SCALES - GENERAL
PAINLEVEL_OUTOF10: 0
PAINLEVEL_OUTOF10: 0

## 2024-09-06 ASSESSMENT — PAIN SCALES - WONG BAKER: WONGBAKER_NUMERICALRESPONSE: NO HURT

## 2024-09-06 NOTE — GROUP NOTE
Group Therapy Note    Date: 9/6/2024    Group Start Time: 1500  Group End Time: 1545  Group Topic: Recreational    [facility]    Jolly Clark        Group Therapy Note    Attendees: 1/10    Topic: Wheel Of Fortune/Goal Setting   Focus: This group focused on goal setting. Patients created a wheel divided into segments. Patients labeled each segment with areas of their life their life they want to improve, some being friends and family, personal growth, fun & leisure, health, career/money, and home environment. Patients discussed each category they selected, goals they listed, and steps they will take to achieve their goals. This group may increase focus, personal growth, and self-awareness.        Notes: Patient did not attend group. Patient sleeping.    Recreational Therapist   Jolly Clark

## 2024-09-06 NOTE — GROUP NOTE
Art Therapy Group Progress Note    PATIENT SCHEDULED FOR GROUP AT: 9:30 AM     GROUP STOP TIME:  10:15 AM    ATTENDANCE: Low (5/13 participants)     TOPIC / FOCUS: Patterned Mandala and Title     GOALS:  define mindfulness, practice deep breathing, promote concentration, disrupt negative thinking, increase autonomy, reduce feelings of stress and anxiety, practice positive coping skills, promote self-awareness     Notes:  Pt agreed to try in group today. Pt was offered a simple image to work with, but the Pt preferred to make their own selection. Pt chose a full page image with a large patterned heart in the middle. Pt appeared to have decision paralysis and self-doubt. Pt asked if they were going the task right. The PT was reminded that art therapy is not right or wrong. This writer encouraged the Pt that their approach was the right approach. Pt shared in discussion. Pt verbalizations were negative.     Status After Intervention:  Unchanged    Participation Level: Minimal    Participation Quality: Resistant      Speech:  normal      Thought Process/Content: Logical, negative, concrete       Affective Functioning: Flat      Mood: anxious and dysphoric      Level of consciousness:  Alert      Response to Learning: Able to verbalize current knowledge/experience      Endings: None Reported    Modes of Intervention: Education, Support, Socialization, Exploration, and Activity      Discipline Responsible: Psychoeducational Specialist      Humza Nixon  Art Therapist, MA, ATR-P  Provisional Registered Art Therapist

## 2024-09-07 PROCEDURE — 6370000000 HC RX 637 (ALT 250 FOR IP): Performed by: PSYCHIATRY & NEUROLOGY

## 2024-09-07 PROCEDURE — 99231 SBSQ HOSP IP/OBS SF/LOW 25: CPT | Performed by: PSYCHIATRY & NEUROLOGY

## 2024-09-07 PROCEDURE — 1240000000 HC EMOTIONAL WELLNESS R&B

## 2024-09-07 RX ADMIN — CLONAZEPAM 1 MG: 1 TABLET ORAL at 16:57

## 2024-09-07 RX ADMIN — DULOXETINE HYDROCHLORIDE 60 MG: 30 CAPSULE, DELAYED RELEASE ORAL at 20:09

## 2024-09-07 RX ADMIN — Medication 2000 UNITS: at 08:33

## 2024-09-07 RX ADMIN — LAMOTRIGINE 150 MG: 100 TABLET ORAL at 21:00

## 2024-09-07 RX ADMIN — QUETIAPINE FUMARATE 400 MG: 300 TABLET ORAL at 20:09

## 2024-09-07 RX ADMIN — CLONAZEPAM 1 MG: 1 TABLET ORAL at 21:00

## 2024-09-07 RX ADMIN — MIRTAZAPINE 30 MG: 15 TABLET, FILM COATED ORAL at 20:09

## 2024-09-07 RX ADMIN — ASPIRIN 81 MG: 81 TABLET, COATED ORAL at 08:33

## 2024-09-07 RX ADMIN — ATORVASTATIN CALCIUM 20 MG: 20 TABLET, FILM COATED ORAL at 20:09

## 2024-09-07 RX ADMIN — HYDROXYZINE HYDROCHLORIDE 50 MG: 50 TABLET, FILM COATED ORAL at 10:57

## 2024-09-07 RX ADMIN — CLONAZEPAM 1 MG: 1 TABLET ORAL at 08:33

## 2024-09-07 RX ADMIN — LAMOTRIGINE 150 MG: 100 TABLET ORAL at 08:33

## 2024-09-07 RX ADMIN — AMLODIPINE BESYLATE 2.5 MG: 5 TABLET ORAL at 08:33

## 2024-09-07 RX ADMIN — DULOXETINE HYDROCHLORIDE 60 MG: 30 CAPSULE, DELAYED RELEASE ORAL at 08:33

## 2024-09-07 ASSESSMENT — PAIN SCALES - WONG BAKER: WONGBAKER_NUMERICALRESPONSE: NO HURT

## 2024-09-07 ASSESSMENT — PAIN SCALES - GENERAL: PAINLEVEL_OUTOF10: 0

## 2024-09-07 NOTE — GROUP NOTE
Art Therapy Group Progress Note    PATIENT SCHEDULED FOR GROUP AT: 12:00 PM     GROUP STOP TIME:  12:45 PM    ATTENDANCE: Low (5/12 participants)     TOPIC / FOCUS: Draw your Mood as a Weather Event      GOALS:  identify current emotional state, increase self-awareness, encourage insight, practice emotional communication, identify coping skills, encourage healthy coping skills, increase focus, promote creativity     Notes:  Pt minimally engaged in art making. Pt began drawing and then became focused that \"my lines are not straight.\" Pt was redirected that its about the process. Pt appeared confused at times. Pt chase a mountain with snow caps to represent their mood. Pt shared in group, identifying walking as a coping skill.     Status After Intervention:  Unchanged    Participation Level: Minimal    Participation Quality: Appropriate and Resistant      Speech:  normal      Thought Process/Content: Logical, negative, concrete, confused       Affective Functioning: Flat      Mood: anxious and dysphoric      Level of consciousness:  Alert      Response to Learning: Able to verbalize current knowledge/experience and Resistant      Endings: None Reported    Modes of Intervention: Education, Support, Socialization, and Exploration      Discipline Responsible: Psychoeducational Specialist      Humza Nixon  Art Therapist, MA, ATR-P  Provisional Registered Art Therapist

## 2024-09-08 PROCEDURE — 1240000000 HC EMOTIONAL WELLNESS R&B

## 2024-09-08 PROCEDURE — 6360000002 HC RX W HCPCS: Performed by: PSYCHIATRY & NEUROLOGY

## 2024-09-08 PROCEDURE — 6370000000 HC RX 637 (ALT 250 FOR IP): Performed by: PSYCHIATRY & NEUROLOGY

## 2024-09-08 PROCEDURE — 99231 SBSQ HOSP IP/OBS SF/LOW 25: CPT | Performed by: PSYCHIATRY & NEUROLOGY

## 2024-09-08 RX ADMIN — HYDROXYZINE HYDROCHLORIDE 50 MG: 50 TABLET, FILM COATED ORAL at 17:28

## 2024-09-08 RX ADMIN — LORAZEPAM 2 MG: 2 INJECTION INTRAMUSCULAR; INTRAVENOUS at 11:07

## 2024-09-08 RX ADMIN — DULOXETINE HYDROCHLORIDE 60 MG: 30 CAPSULE, DELAYED RELEASE ORAL at 07:36

## 2024-09-08 RX ADMIN — CLONAZEPAM 1 MG: 1 TABLET ORAL at 07:36

## 2024-09-08 RX ADMIN — CLONAZEPAM 1 MG: 1 TABLET ORAL at 21:00

## 2024-09-08 RX ADMIN — ASPIRIN 81 MG: 81 TABLET, COATED ORAL at 07:36

## 2024-09-08 RX ADMIN — HYDROXYZINE HYDROCHLORIDE 50 MG: 50 TABLET, FILM COATED ORAL at 10:28

## 2024-09-08 RX ADMIN — ATORVASTATIN CALCIUM 20 MG: 20 TABLET, FILM COATED ORAL at 20:03

## 2024-09-08 RX ADMIN — LAMOTRIGINE 150 MG: 100 TABLET ORAL at 07:36

## 2024-09-08 RX ADMIN — MIRTAZAPINE 30 MG: 15 TABLET, FILM COATED ORAL at 20:02

## 2024-09-08 RX ADMIN — Medication 2000 UNITS: at 07:36

## 2024-09-08 RX ADMIN — LAMOTRIGINE 150 MG: 100 TABLET ORAL at 21:00

## 2024-09-08 RX ADMIN — DULOXETINE HYDROCHLORIDE 60 MG: 30 CAPSULE, DELAYED RELEASE ORAL at 20:02

## 2024-09-08 RX ADMIN — QUETIAPINE FUMARATE 400 MG: 300 TABLET ORAL at 20:02

## 2024-09-08 RX ADMIN — CLONAZEPAM 1 MG: 1 TABLET ORAL at 14:42

## 2024-09-08 RX ADMIN — AMLODIPINE BESYLATE 2.5 MG: 5 TABLET ORAL at 07:36

## 2024-09-08 ASSESSMENT — PAIN SCALES - WONG BAKER
WONGBAKER_NUMERICALRESPONSE: NO HURT
WONGBAKER_NUMERICALRESPONSE: NO HURT

## 2024-09-08 ASSESSMENT — PAIN SCALES - GENERAL
PAINLEVEL_OUTOF10: 0
PAINLEVEL_OUTOF10: 0

## 2024-09-09 PROCEDURE — 99233 SBSQ HOSP IP/OBS HIGH 50: CPT | Performed by: PSYCHIATRY & NEUROLOGY

## 2024-09-09 PROCEDURE — 6370000000 HC RX 637 (ALT 250 FOR IP): Performed by: PSYCHIATRY & NEUROLOGY

## 2024-09-09 PROCEDURE — 1240000000 HC EMOTIONAL WELLNESS R&B

## 2024-09-09 PROCEDURE — 6360000002 HC RX W HCPCS: Performed by: PSYCHIATRY & NEUROLOGY

## 2024-09-09 RX ADMIN — CLONAZEPAM 1 MG: 1 TABLET ORAL at 07:57

## 2024-09-09 RX ADMIN — MIRTAZAPINE 30 MG: 15 TABLET, FILM COATED ORAL at 20:39

## 2024-09-09 RX ADMIN — AMLODIPINE BESYLATE 2.5 MG: 5 TABLET ORAL at 07:58

## 2024-09-09 RX ADMIN — LAMOTRIGINE 150 MG: 100 TABLET ORAL at 07:57

## 2024-09-09 RX ADMIN — LAMOTRIGINE 150 MG: 100 TABLET ORAL at 20:38

## 2024-09-09 RX ADMIN — DULOXETINE HYDROCHLORIDE 60 MG: 30 CAPSULE, DELAYED RELEASE ORAL at 20:38

## 2024-09-09 RX ADMIN — ASPIRIN 81 MG: 81 TABLET, COATED ORAL at 07:57

## 2024-09-09 RX ADMIN — CLONAZEPAM 1 MG: 1 TABLET ORAL at 14:31

## 2024-09-09 RX ADMIN — LORAZEPAM 2 MG: 2 INJECTION INTRAMUSCULAR; INTRAVENOUS at 10:38

## 2024-09-09 RX ADMIN — Medication 2000 UNITS: at 07:57

## 2024-09-09 RX ADMIN — HYDROXYZINE HYDROCHLORIDE 50 MG: 50 TABLET, FILM COATED ORAL at 14:02

## 2024-09-09 RX ADMIN — CLONAZEPAM 1 MG: 1 TABLET ORAL at 20:39

## 2024-09-09 RX ADMIN — ATORVASTATIN CALCIUM 20 MG: 20 TABLET, FILM COATED ORAL at 20:39

## 2024-09-09 RX ADMIN — LORAZEPAM 1 MG: 1 TABLET ORAL at 18:24

## 2024-09-09 RX ADMIN — QUETIAPINE FUMARATE 400 MG: 300 TABLET ORAL at 20:39

## 2024-09-09 RX ADMIN — DULOXETINE HYDROCHLORIDE 60 MG: 30 CAPSULE, DELAYED RELEASE ORAL at 07:57

## 2024-09-09 ASSESSMENT — PAIN SCALES - GENERAL
PAINLEVEL_OUTOF10: 0
PAINLEVEL_OUTOF10: 0

## 2024-09-09 NOTE — GROUP NOTE
Group Therapy Note    Date: 9/9/2024    Group Start Time: 1430  Group End Time: 1600  Group Topic: Music Therapy      Toni Burger        Group Therapy Note    Attendees: 9/14    Group Focus: Music therapy group explored the topic of music and emotions. The group started with psychoeducation regarding the DBT skills of Emotions on a Continuum and Emotions and their Opposites. The group continued with group members selecting a variety of emotions to represent musically. Group members also discussed symptoms and warning signs related to burn out, anxiety, and other more intense emotions to be able to recognize lower intensity versions of those emotional states to begin applying coping skills.        Notes:  Pt did not attend group.      Discipline Responsible: /Counselor      Signature:  LUCIAN Dickson, LPC  Board-Certified Music Therapist  Licensed Professional Counselor

## 2024-09-09 NOTE — GROUP NOTE
Art Therapy Group Progress Note    PATIENT SCHEDULED FOR GROUP AT: 1:00 PM     GROUP STOP TIME:  1:45 PM    ATTENDANCE: Low (8/13 participants)     TOPIC / FOCUS: Emotion Color Wheel     GOALS:  To identify different emotions, discuss experiences of different emotions, reflect on emotions and reactions to emotions, modeling emotional communication skills, identify coping skills and provide alternative coping options, encourage self-expression, support creativity, encourage emotional management tools    Notes:  Pt had difficulty identifying and labeling emotions. Pt was able to identify anger. Pt then became frustrated/ anxious about the work. A mental health tech helped the pt with their work by asking guiding questions. The pt was resistant and unable to complete their work.     Status After Intervention:  Unchanged    Participation Level: Minimal    Participation Quality: Resistant      Speech:  normal      Thought Process/Content: logical at times, concrete, negative       Affective Functioning: Flat      Mood: anxious and dysphoric      Level of consciousness:  Alert      Response to Learning: Able to verbalize current knowledge/experience and Resistant      Endings: None Reported    Modes of Intervention: Education, Support, and Socialization      Discipline Responsible: Psychoeducational Specialist      Humza Nixon  Art Therapist, MA, ATR-P  Provisional Registered Art Therapist

## 2024-09-09 NOTE — GROUP NOTE
Group Therapy Note    Date: 9/9/2024    Group Start Time: 0930  Group End Time: 1020  Group Topic: Recreational    [facility]    Kirsten Clark        Group Therapy Note    Attendees: 3/14    Group type: Stretching   Group Focus: This recreational group engaged patients in physical activity. Recreational therapists led group members in guided stretches. After stretches, patients discussed anxiety. Group members were able to answer questions, listen and communicate with peers on handling anxious feeling. This group may help reduce feelings of depression and stress and enhance mood and over emotional well-being.               Notes:Patient resistant to engaging in group. Therapist continued to encourage pt to participate in group, pt stated \"I can't\" \"I'm not able to\". Patient seemed as if he was anxious, at times legs shaking, putting hands on his head and putting it down, and pacing unit. PT shared he doesn't know what triggers his anxiety \"I just happens\". Therapist questioned patient about what has changed/he worked on since being in treatment facility pt stated \" Nothing has changed, everything is still that same\".      Status After Intervention:  Unchanged    Participation Level: Minimal    Participation Quality: Resistant      Speech:  normal      Thought Process/Content: Logical      Affective Functioning: Exaggerated and Flat      Mood: anxious and dysphoric      Level of consciousness:  Preoccupied and Inattentive      Response to Learning: Resistant      Endings: None Reported    Modes of Intervention: Socialization, Activity, Movement, and Media      Recreational Therapist  KIRSTEN CLARK

## 2024-09-10 PROCEDURE — 1240000000 HC EMOTIONAL WELLNESS R&B

## 2024-09-10 PROCEDURE — 6370000000 HC RX 637 (ALT 250 FOR IP): Performed by: PSYCHIATRY & NEUROLOGY

## 2024-09-10 PROCEDURE — 99233 SBSQ HOSP IP/OBS HIGH 50: CPT | Performed by: PSYCHIATRY & NEUROLOGY

## 2024-09-10 RX ORDER — QUETIAPINE FUMARATE 300 MG/1
300 TABLET, FILM COATED ORAL NIGHTLY
Status: DISCONTINUED | OUTPATIENT
Start: 2024-09-11 | End: 2024-09-11

## 2024-09-10 RX ADMIN — AMLODIPINE BESYLATE 2.5 MG: 5 TABLET ORAL at 08:37

## 2024-09-10 RX ADMIN — DULOXETINE HYDROCHLORIDE 60 MG: 30 CAPSULE, DELAYED RELEASE ORAL at 08:36

## 2024-09-10 RX ADMIN — ATORVASTATIN CALCIUM 20 MG: 20 TABLET, FILM COATED ORAL at 20:05

## 2024-09-10 RX ADMIN — LAMOTRIGINE 150 MG: 100 TABLET ORAL at 08:37

## 2024-09-10 RX ADMIN — ASPIRIN 81 MG: 81 TABLET, COATED ORAL at 08:37

## 2024-09-10 RX ADMIN — Medication 2000 UNITS: at 08:36

## 2024-09-10 RX ADMIN — CLONAZEPAM 1 MG: 1 TABLET ORAL at 14:56

## 2024-09-10 RX ADMIN — DULOXETINE HYDROCHLORIDE 60 MG: 30 CAPSULE, DELAYED RELEASE ORAL at 20:05

## 2024-09-10 RX ADMIN — MIRTAZAPINE 30 MG: 15 TABLET, FILM COATED ORAL at 20:05

## 2024-09-10 RX ADMIN — CLONAZEPAM 1 MG: 1 TABLET ORAL at 20:05

## 2024-09-10 RX ADMIN — LAMOTRIGINE 150 MG: 100 TABLET ORAL at 20:05

## 2024-09-10 RX ADMIN — QUETIAPINE FUMARATE 400 MG: 300 TABLET ORAL at 20:05

## 2024-09-10 RX ADMIN — CLONAZEPAM 1 MG: 1 TABLET ORAL at 08:36

## 2024-09-10 NOTE — GROUP NOTE
Group Therapy Note    Date: 9/10/2024    Group Start Time: 1510  Group End Time: 1600  Group Topic: Recreational    [facility]    Kirsten Clark        Group Therapy Note    Attendees: 5/12      Group:Anger Bingo    Focus: Recreational therapy groups engaged patients through a game that focused on anger. PTs identified triggers, symptoms, control and ways to prevent anger. Group may help enhance social and coping skills, and decrease stress, depression and anxiety               Notes:  Patient engaged in group with low energy, patient made several attempts to participate in group, required constant encouragement during group. Patient at times resistant to sharing. Stating \"I don't know\",\"I can't do this\". Patient at time discussed how he didn't feel anything reading the words on bingo card. Therapist met with pt one after group and continued to provide encouragement and gave patient positive self-talk task to work on.   Status After Intervention:  Unchanged    Participation Level: Active Listener and Interactive    Participation Quality: Appropriate, Attentive, and Sharing      Speech:  normal and hesitant      Thought Process/Content: concrete \"I don't know\"       Affective Functioning: Flat      Mood: anxious and depressed      Level of consciousness:  Alert and Attentive      Response to Learning: Able to verbalize/acknowledge new learning and Resistant (at times)      Endings: None Reported    Modes of Intervention: Education, Socialization, Problem-solving, and Activity      Recreational Therapist  KIRSTEN CLARK

## 2024-09-10 NOTE — BH NOTE
Patient appeared to have slept for 8 hours throughout the night with no issues will continue to 1:1 monitor for safety..

## 2024-09-10 NOTE — GROUP NOTE
Group Therapy Note    Date: 9/10/2024    Group Start Time: 0930  Group End Time: 1030  Group Topic: Music Therapy      Toni Burger        Group Therapy Note    Attendees: 5/14    Group Focus: Music therapy group explored values and wisdom or life advice through song marimar analysis, a songwriting technique, and an ACT values handout. Group members identified values and life advice which were then sung back with a live rendition of the song to support the group process. The group may promote self-awareness, self-reflection, identification of values, and use of music as a coping skill.        Notes:  Pt reported feeling \"anxious at times\" and \"ready to leave\" at the start of group. Pt then stated he was feeling \"homicidal,\" which was vague as pt did not provide any specifics on HI and contracted to safety within the group. Treatment team members including , FRANCO, and nursing staff informed. Pt engaged in the group with dysphoric affect. Pt discussed music at start of group in a manner that was appropriate and logical at times. Later in group, pt discussed how he could not feel the music, and also discussed how he could no longer feel songs like \"[place],\" stating he used to feel something for that song as he stated it related to his wife. Therapist asked pt about values worksheet which was what the group was working on at the time, and pt discussed how he could not feel music and how he could not identify triggers. Pt made a gesture related to his mind and therapist provided ACT metaphor related to settling thoughts and going in values-based direction to address pt's concerns. It is unclear if pt was receptive to this idea.     Status After Intervention:  Unchanged    Participation Level: Interactive    Participation Quality: Appropriate and Resistant      Speech:  normal      Thought Process/Content: Logical      Affective Functioning:

## 2024-09-10 NOTE — GROUP NOTE
Art Therapy Group Progress Note    PATIENT SCHEDULED FOR GROUP AT: 1:00 PM     GROUP STOP TIME:  1:45 PM    ATTENDANCE: Low (4/15 participants)     TOPIC / FOCUS:  DBT House      GOALS:  identifying strengths and supports, identifying areas of growth, identifying coping skills, encouraging focus, increasing reflective distance, promoting healthy and appropriate coping skills, encouraging creativity, increasing feelings of autonomy, increasing personal awareness, encouraging insight, increasing sense of self     Notes:  Pt began working, identifying depression as something they wanted to change. However, the Pt quickly became preoccupied with \"I can't.\" Pt did not even complete writing depression on their page. This writer was walking the group through each step of the art directive. The Pt had difficulty with comprehension and stuck on \"I can't.\" Pt got frustrated and began walking around the day area. Pt then settled in the kitchenette, with their head under the corner of the cabinet. The Pt was redirected to move from under the cabinets for pt safety. The Pt responded to staff redirection, however the effects were temporary and the Pt kept putting their head under the cabinet corner. The PT was redirected about 3 times before they returned to their room for sometime.      Status After Intervention:  Unchanged    Participation Level: None    Participation Quality: Resistant      Speech:  normal      Thought Process/Content: Perseverating on \"I can't\", negative, concrete       Affective Functioning: Flat      Mood: anxious and depressed      Level of consciousness:  Alert and Inattentive      Response to Learning: Resistant      Endings: None Reported    Modes of Intervention: Education, Support, Socialization, Exploration, and Clarifying      Discipline Responsible: Psychoeducational Specialist      Humza Nixon  Art Therapist, MA, ATR-P  Provisional Registered Art Therapist

## 2024-09-11 PROCEDURE — 6370000000 HC RX 637 (ALT 250 FOR IP): Performed by: PSYCHIATRY & NEUROLOGY

## 2024-09-11 PROCEDURE — 99232 SBSQ HOSP IP/OBS MODERATE 35: CPT | Performed by: PSYCHIATRY & NEUROLOGY

## 2024-09-11 PROCEDURE — 1240000000 HC EMOTIONAL WELLNESS R&B

## 2024-09-11 RX ORDER — QUETIAPINE FUMARATE 100 MG/1
200 TABLET, FILM COATED ORAL NIGHTLY
Status: DISCONTINUED | OUTPATIENT
Start: 2024-09-11 | End: 2024-09-30 | Stop reason: HOSPADM

## 2024-09-11 RX ORDER — MIRTAZAPINE 15 MG/1
45 TABLET, FILM COATED ORAL NIGHTLY
Status: DISCONTINUED | OUTPATIENT
Start: 2024-09-11 | End: 2024-09-30 | Stop reason: HOSPADM

## 2024-09-11 RX ADMIN — ATORVASTATIN CALCIUM 20 MG: 20 TABLET, FILM COATED ORAL at 20:17

## 2024-09-11 RX ADMIN — CLONAZEPAM 1 MG: 1 TABLET ORAL at 21:00

## 2024-09-11 RX ADMIN — QUETIAPINE FUMARATE 200 MG: 100 TABLET ORAL at 20:17

## 2024-09-11 RX ADMIN — ASPIRIN 81 MG: 81 TABLET, COATED ORAL at 08:16

## 2024-09-11 RX ADMIN — CLONAZEPAM 1 MG: 1 TABLET ORAL at 14:23

## 2024-09-11 RX ADMIN — Medication 2000 UNITS: at 08:16

## 2024-09-11 RX ADMIN — LAMOTRIGINE 150 MG: 100 TABLET ORAL at 08:16

## 2024-09-11 RX ADMIN — AMLODIPINE BESYLATE 2.5 MG: 5 TABLET ORAL at 08:16

## 2024-09-11 RX ADMIN — CLONAZEPAM 1 MG: 1 TABLET ORAL at 08:16

## 2024-09-11 RX ADMIN — LAMOTRIGINE 150 MG: 100 TABLET ORAL at 21:00

## 2024-09-11 RX ADMIN — DULOXETINE HYDROCHLORIDE 60 MG: 30 CAPSULE, DELAYED RELEASE ORAL at 20:18

## 2024-09-11 RX ADMIN — DULOXETINE HYDROCHLORIDE 60 MG: 30 CAPSULE, DELAYED RELEASE ORAL at 08:16

## 2024-09-11 RX ADMIN — MIRTAZAPINE 45 MG: 15 TABLET, FILM COATED ORAL at 20:17

## 2024-09-11 ASSESSMENT — PAIN SCALES - GENERAL: PAINLEVEL_OUTOF10: 0

## 2024-09-11 ASSESSMENT — PAIN SCALES - WONG BAKER: WONGBAKER_NUMERICALRESPONSE: NO HURT

## 2024-09-11 NOTE — GROUP NOTE
Group Therapy Note    Date: 9/11/2024    Group Start Time: 0930  Group End Time: 1015  Group Topic: Recreational    [facility]    Kirsten Clark        Group Therapy Note    Attendees: 9/11    Group : Name That Tune    Focus: Recreational Therapist engaged patients through name that tune. Patients used different songs to have meaningful conversation. This group may help/promote reducing stress, anxiety, and symptoms of depression, while improving mood, well-being, and communication with peers/ team.            Notes: Patient was engaged and shared in group. Patient reported feeling \"okay\" at the start of group. Patient discussed wanting to work on his relationship with his wife and expressed feeling bad about the thoughts he \"was\" having towards her at one point. Patient expressed feeling happy that he has been \"trying\" in groups.     Status After Intervention:  Unchanged    Participation Level: Active Listener and Interactive    Participation Quality: Appropriate, Attentive, and Sharing      Speech:  normal      Thought Process/Content: Logical      Affective Functioning: Flat      Mood: calm      Level of consciousness:  Alert and Attentive      Response to Learning: Able to verbalize current knowledge/experience      Endings: None Reported    Modes of Intervention: Socialization, Activity, and Media      Recreational Therapist  KIRSTEN CLARK

## 2024-09-11 NOTE — GROUP NOTE
Group Therapy Note    Date: 9/11/2024    Group Start Time: 1400  Group End Time: 1500  Group Topic: Music Therapy      Toni Burger        Group Therapy Note    Attendees: 8/11    Group Focus: Music therapy group consisted of psychoeducation on the creation of mood regulation playlists. The group created a playlist moving from the group's undesired mood state of despair to their desired mood state of hopeful. Group members assigned a Likert scale of 1-5 for the emotional content of the music, lyrics, and overall song to then order the playlist. The group may promote use of music as a tool to help regulate mood.       Notes:  Pt did not attend this group. Before group started, pt shared his Safety Plan with this therapist. Pt's Safety Plan showed that he had added items to it since therapist met with him this morning about it. Therapist provided support, answered question pt had about 988 number, and encouraged him to continue to add to his safety plan as he thought of items to add.      Discipline Responsible: /Counselor      Signature:  LUCIAN Dickson, LPC  Board-Certified Music Therapist  Licensed Professional Counselor

## 2024-09-11 NOTE — GROUP NOTE
Group Therapy Note    Date: 9/11/2024    Group Start Time: 1500  Group End Time: 1600  Group Topic: Recreational    [facility]    Kirsten Clark        Group Therapy Note    Attendees: 9/11    Group : Noemy    Focus: Recreational therapy group engaged patients through a group game. RT placed patients into two teams to encourage team building. RT used topics that focused on self-care, coping skills, and communication terms. The group can assist in increasing positive mood, self-care, social and problem-solving skills, and reduce stress.            Notes:  Patient was engaged, focused, and shared. Pt engaged with peers in a positive manner. At times therapist had to provide patient with encouragement to help pt with participation in group. Patient left group early, at about a half way point.     Status After Intervention:  Unchanged    Participation Level: Active Listener and Interactive    Participation Quality: Appropriate, Attentive, and Sharing      Speech:  normal      Thought Process/Content: Logical      Affective Functioning: Flat      Mood: euthymic      Level of consciousness:  Alert and Attentive      Response to Learning: Able to verbalize current knowledge/experience      Endings: None Reported    Modes of Intervention: Socialization, Problem-solving, and Activity       Recreational Therapist  KIRSTEN CLARK

## 2024-09-11 NOTE — GROUP NOTE
Art Therapy Group Progress Note    PATIENT SCHEDULED FOR GROUP AT: 1:00 PM     GROUP STOP TIME:  1:45 PM    ATTENDANCE: Moderate (5/11 participants)     TOPIC / FOCUS:  Creating A Schedule       GOALS:  create structure, identify priorities, encourage organization, promote routine, identify healthy habits, encourage positive coping skills, reduce feelings of stress and anxiety, encourage problem solving skills, support reality orientation    Notes:  Pt engagement was minimal. Pt was able to identify some activities that occur in the morning such as letting the dog out. Pt expressed that they let the dog out and then go back to sleep. Pt was unable to select a wake up time. Pt appeared to have difficulty with the task. Pt was observed with his hand in his head. Pt was called from group to meet with the Dr. Pt returned to group as it was completing.     Status After Intervention:  Unchanged    Participation Level: Minimal    Participation Quality: Resistant      Speech:  normal      Thought Process/Content: concrete, negative, confused, resistant       Affective Functioning: Flat      Mood: anxious and dysphoric      Level of consciousness:  Inattentive      Response to Learning: Resistant      Endings: None Reported    Modes of Intervention: Education, Support, Socialization, Exploration, Clarifying, and Problem-solving      Discipline Responsible: Psychoeducational Specialist    Humza Nixon  Art Therapist, MA, ATR-P  Provisional Registered Art Therapist

## 2024-09-12 PROCEDURE — 6370000000 HC RX 637 (ALT 250 FOR IP): Performed by: PSYCHIATRY & NEUROLOGY

## 2024-09-12 PROCEDURE — 99232 SBSQ HOSP IP/OBS MODERATE 35: CPT | Performed by: PSYCHIATRY & NEUROLOGY

## 2024-09-12 PROCEDURE — 1240000000 HC EMOTIONAL WELLNESS R&B

## 2024-09-12 RX ADMIN — QUETIAPINE FUMARATE 200 MG: 100 TABLET ORAL at 20:15

## 2024-09-12 RX ADMIN — DULOXETINE HYDROCHLORIDE 60 MG: 30 CAPSULE, DELAYED RELEASE ORAL at 07:32

## 2024-09-12 RX ADMIN — CLONAZEPAM 1 MG: 1 TABLET ORAL at 21:00

## 2024-09-12 RX ADMIN — MIRTAZAPINE 45 MG: 15 TABLET, FILM COATED ORAL at 20:15

## 2024-09-12 RX ADMIN — LAMOTRIGINE 150 MG: 100 TABLET ORAL at 07:32

## 2024-09-12 RX ADMIN — Medication 2000 UNITS: at 07:32

## 2024-09-12 RX ADMIN — DULOXETINE HYDROCHLORIDE 60 MG: 30 CAPSULE, DELAYED RELEASE ORAL at 20:15

## 2024-09-12 RX ADMIN — LAMOTRIGINE 150 MG: 100 TABLET ORAL at 21:00

## 2024-09-12 RX ADMIN — CLONAZEPAM 1 MG: 1 TABLET ORAL at 14:50

## 2024-09-12 RX ADMIN — AMLODIPINE BESYLATE 2.5 MG: 5 TABLET ORAL at 07:32

## 2024-09-12 RX ADMIN — CLONAZEPAM 1 MG: 1 TABLET ORAL at 07:32

## 2024-09-12 RX ADMIN — ATORVASTATIN CALCIUM 20 MG: 20 TABLET, FILM COATED ORAL at 20:14

## 2024-09-12 RX ADMIN — ASPIRIN 81 MG: 81 TABLET, COATED ORAL at 07:32

## 2024-09-12 ASSESSMENT — PAIN SCALES - GENERAL
PAINLEVEL_OUTOF10: 0
PAINLEVEL_OUTOF10: 0

## 2024-09-12 ASSESSMENT — PAIN SCALES - WONG BAKER
WONGBAKER_NUMERICALRESPONSE: NO HURT
WONGBAKER_NUMERICALRESPONSE: NO HURT

## 2024-09-12 NOTE — GROUP NOTE
Art Therapy Group Progress Note    PATIENT SCHEDULED FOR GROUP AT: 1:00 PM     GROUP STOP TIME:  1:45 PM    ATTENDANCE: Moderate (8/13 participants)     TOPIC / FOCUS: Positive Affirmation Coloring Sheet    GOALS: define positive affirmations, identify ways to incorporate affirmations into daily practice, encourage creativity, increase focus, promote social skills/connection, increase positive feelings, increase autonomy, promote positive coping skills     Notes:  Pt selected \"let go.\" Pt was able to complete coloring of two sections of the image. Pt shared they needed to let go of the past so that new growth can blossom. Pt engagement was low energy and negative at times. However, pt engagement has increased.     Status After Intervention:  small improvement     Participation Level: minimal, slightly interactive     Participation Quality: Appropriate, low energy       Speech:  normal      Thought Process/Content: Logical, concrete, negative at times       Affective Functioning: Flat      Mood: anxious      Level of consciousness:  Alert      Response to Learning: Able to verbalize current knowledge/experience      Endings: None Reported    Modes of Intervention: Education, Support, Socialization, Exploration, Clarifying, and Problem-solving      Discipline Responsible: Psychoeducational Specialist    Humza Nixon  Art Therapist, MA, ATR-P  Provisional Registered Art Therapist

## 2024-09-12 NOTE — GROUP NOTE
Group Therapy Note    Date: 9/12/2024    Group Start Time: 0930  Group End Time: 1030  Group Topic: Music Therapy      Toni Burger        Group Therapy Note    Attendees: 7/12    Group Focus: Music therapy group explored themes related to depression and resiliency through marimar analysis. The group also discussed topics including healthy and unhealthy coping skills, knowing a person isn't alone, and not giving up. The group closed with group members sharing songs related to resiliency.       Notes:  Pt attended about the first half of group and left early. Pt originally shared a reaction to the song that was on-topic. Later in group, pt reported feeling \"lost\" as to what the group was doing. Therapist explained again that the group was engaging in song discussion and marimar discussion. Pt seemed to demonstrate some confusion.    Status After Intervention:  Unchanged    Participation Level: Active Listener    Participation Quality: Appropriate      Speech:  normal      Thought Process/Content: Logical and Confused      Affective Functioning: Blunted      Mood: depressed      Level of consciousness:  Alert and Attentive      Response to Learning: Able to verbalize current knowledge/experience      Endings: None Reported    Modes of Intervention: Support, Socialization, Exploration, and Media      Discipline Responsible: /Counselor      Signature:  LUCIAN Dickson, LPC  Board-Certified Music Therapist  Licensed Professional Counselor

## 2024-09-13 PROCEDURE — 6370000000 HC RX 637 (ALT 250 FOR IP): Performed by: PSYCHIATRY & NEUROLOGY

## 2024-09-13 PROCEDURE — 1240000000 HC EMOTIONAL WELLNESS R&B

## 2024-09-13 PROCEDURE — 99232 SBSQ HOSP IP/OBS MODERATE 35: CPT | Performed by: PSYCHIATRY & NEUROLOGY

## 2024-09-13 RX ADMIN — LAMOTRIGINE 150 MG: 100 TABLET ORAL at 21:00

## 2024-09-13 RX ADMIN — LAMOTRIGINE 150 MG: 100 TABLET ORAL at 08:46

## 2024-09-13 RX ADMIN — CLONAZEPAM 1 MG: 1 TABLET ORAL at 21:00

## 2024-09-13 RX ADMIN — CLONAZEPAM 1 MG: 1 TABLET ORAL at 15:27

## 2024-09-13 RX ADMIN — DULOXETINE HYDROCHLORIDE 60 MG: 30 CAPSULE, DELAYED RELEASE ORAL at 20:22

## 2024-09-13 RX ADMIN — QUETIAPINE FUMARATE 200 MG: 100 TABLET ORAL at 20:22

## 2024-09-13 RX ADMIN — ASPIRIN 81 MG: 81 TABLET, COATED ORAL at 08:46

## 2024-09-13 RX ADMIN — ATORVASTATIN CALCIUM 20 MG: 20 TABLET, FILM COATED ORAL at 20:23

## 2024-09-13 RX ADMIN — MIRTAZAPINE 45 MG: 15 TABLET, FILM COATED ORAL at 20:22

## 2024-09-13 RX ADMIN — AMLODIPINE BESYLATE 2.5 MG: 5 TABLET ORAL at 08:46

## 2024-09-13 RX ADMIN — Medication 2000 UNITS: at 08:46

## 2024-09-13 RX ADMIN — CLONAZEPAM 1 MG: 1 TABLET ORAL at 08:46

## 2024-09-13 RX ADMIN — DULOXETINE HYDROCHLORIDE 60 MG: 30 CAPSULE, DELAYED RELEASE ORAL at 08:46

## 2024-09-13 ASSESSMENT — PAIN SCALES - WONG BAKER: WONGBAKER_NUMERICALRESPONSE: NO HURT

## 2024-09-13 ASSESSMENT — PAIN SCALES - GENERAL: PAINLEVEL_OUTOF10: 0

## 2024-09-13 NOTE — GROUP NOTE
Art Therapy Group Progress Note    PATIENT SCHEDULED FOR GROUP AT: 9:30 AM     GROUP STOP TIME:  10:15 AM    ATTENDANCE: Moderate (5/13 participants)     TOPIC / FOCUS:  Kinesthetic Art Making     GOALS:  decreasing stress and anxiety, promote positive coping skills, increase focus, increase mind body connection and regulation, encourage creativity and joyful experience, practice bilateral stimulation, increase engagement, encourage socialization     Notes:  Pt was overwhelmed by the lack of structure for the prompt. Pt was shown multiple different approaches, encouraged by peers, and encouraged by this writer. Pt was able to create some imagery. Pt verbalizations continue to be negative and centered around comparison. Pt appears to be gaining some frustration tolerance evidenced by their attempts to draw multiple things. At the end of group the Pt had drawn a wave, themselves fishing at the end of a pier, an American Flag, and a car. Pt's level of engagement has improved.       Status After Intervention:  Improved    Participation Level: Interactive    Participation Quality: Appropriate and Resistant      Speech:  normal      Thought Process/Content: Logical, concrete, negative       Affective Functioning: Flat      Mood: anxious and dysphoric      Level of consciousness:  Alert and Attentive      Response to Learning: Able to verbalize current knowledge/experience      Endings: None Reported    Modes of Intervention: Support, Socialization, Exploration, Clarifying, Activity, and Confrontation      Discipline Responsible: Psychoeducational Specialist      Humza Nixon  Art Therapist, MA, ATR-P  Provisional Registered Art Therapist

## 2024-09-13 NOTE — FLOWSHEET NOTE
09/13/24 0831   Vital Signs   Temp 98 °F (36.7 °C)   Temp Source Oral   Pulse 89   Heart Rate Source Brachial   Respirations 18   BP (!) 144/83   MAP (Calculated) 103   BP Location Right upper arm   BP Method Automatic   Patient Position Sitting

## 2024-09-13 NOTE — GROUP NOTE
Group Therapy Note    Date: 9/13/2024    Group Start Time: 1300  Group End Time: 1400  Group Topic: Music Therapy      Toni Burger        Group Therapy Note    Attendees: 3/9    Group Focus: Music therapy group utilized the intervention of marimar analysis, including patient preferred music. The group explored topics and metaphor including darkness/light in relation to mental health, decision making in regards to one's life path, repeating what's familiar, resiliency, aligning with values, and support. The group may promote self-awareness and use of music as a coping skill.       Notes:  Pt did not attend group.      Discipline Responsible: /Counselor      Signature:  LUCIAN Dickson, LPC  Board-Certified Music Therapist  Licensed Professional Counselor

## 2024-09-13 NOTE — GROUP NOTE
Art Therapy Group Progress Note    PATIENT SCHEDULED FOR GROUP AT: 2:00 PM     GROUP STOP TIME:  2:45 PM    ATTENDANCE: Low (3/9 participants)     TOPIC / FOCUS: Clouded Thoughts, current thought record    GOALS:  identifying thoughts which are leading to problematic emotions, increase mindfulness, practice grounding techniques and positive coping skills, encourage self-expression, promote creativity, increase self-awareness, educate on reframing thoughts reduce stress, reduce anxiety    PARTICIPATION LEVEL:  Pt did not attend.     Humza Nixon  Art Therapist, MA, ATR-P  Provisional Registered Art Therapist

## 2024-09-13 NOTE — GROUP NOTE
Group Therapy Note    Date: 9/13/2024    Group Start Time: 1520  Group End Time: 1600  Group Topic: Recreational    [facility]    Jolly Clark        Group Therapy Note    Attendees: 8/10    Group Type: Act It, Draw It, Describe It     Focus: Recreational therapy group involved group members identifying emotions and coping skills through acting drawing, or descriptions. Patients discussed how to positively react and cope with stressful life events. This group may enhance coping strategies, emotional awareness, mood, and decrease stress and anxiety.           Notes: Patient did not attend group.     Recreational Therapist   Jolly Clark

## 2024-09-14 PROCEDURE — 99232 SBSQ HOSP IP/OBS MODERATE 35: CPT | Performed by: PSYCHIATRY & NEUROLOGY

## 2024-09-14 PROCEDURE — 1240000000 HC EMOTIONAL WELLNESS R&B

## 2024-09-14 PROCEDURE — 6370000000 HC RX 637 (ALT 250 FOR IP): Performed by: PSYCHIATRY & NEUROLOGY

## 2024-09-14 RX ADMIN — CLONAZEPAM 1 MG: 1 TABLET ORAL at 20:01

## 2024-09-14 RX ADMIN — QUETIAPINE FUMARATE 200 MG: 100 TABLET ORAL at 20:01

## 2024-09-14 RX ADMIN — CLONAZEPAM 1 MG: 1 TABLET ORAL at 14:34

## 2024-09-14 RX ADMIN — CLONAZEPAM 1 MG: 1 TABLET ORAL at 08:33

## 2024-09-14 RX ADMIN — ASPIRIN 81 MG: 81 TABLET, COATED ORAL at 08:34

## 2024-09-14 RX ADMIN — LAMOTRIGINE 150 MG: 100 TABLET ORAL at 08:34

## 2024-09-14 RX ADMIN — MIRTAZAPINE 45 MG: 15 TABLET, FILM COATED ORAL at 20:01

## 2024-09-14 RX ADMIN — ATORVASTATIN CALCIUM 20 MG: 20 TABLET, FILM COATED ORAL at 20:01

## 2024-09-14 RX ADMIN — AMLODIPINE BESYLATE 2.5 MG: 5 TABLET ORAL at 08:34

## 2024-09-14 RX ADMIN — DULOXETINE HYDROCHLORIDE 60 MG: 30 CAPSULE, DELAYED RELEASE ORAL at 20:01

## 2024-09-14 RX ADMIN — DULOXETINE HYDROCHLORIDE 60 MG: 30 CAPSULE, DELAYED RELEASE ORAL at 08:33

## 2024-09-14 RX ADMIN — Medication 2000 UNITS: at 08:33

## 2024-09-14 RX ADMIN — LAMOTRIGINE 150 MG: 100 TABLET ORAL at 20:01

## 2024-09-14 ASSESSMENT — PAIN SCALES - GENERAL: PAINLEVEL_OUTOF10: 0

## 2024-09-15 PROCEDURE — 99232 SBSQ HOSP IP/OBS MODERATE 35: CPT | Performed by: PSYCHIATRY & NEUROLOGY

## 2024-09-15 PROCEDURE — 6370000000 HC RX 637 (ALT 250 FOR IP): Performed by: PSYCHIATRY & NEUROLOGY

## 2024-09-15 PROCEDURE — 1240000000 HC EMOTIONAL WELLNESS R&B

## 2024-09-15 RX ADMIN — CLONAZEPAM 1 MG: 1 TABLET ORAL at 15:04

## 2024-09-15 RX ADMIN — LAMOTRIGINE 150 MG: 100 TABLET ORAL at 20:15

## 2024-09-15 RX ADMIN — DULOXETINE HYDROCHLORIDE 60 MG: 30 CAPSULE, DELAYED RELEASE ORAL at 08:15

## 2024-09-15 RX ADMIN — QUETIAPINE FUMARATE 200 MG: 100 TABLET ORAL at 20:15

## 2024-09-15 RX ADMIN — AMLODIPINE BESYLATE 2.5 MG: 5 TABLET ORAL at 08:14

## 2024-09-15 RX ADMIN — CLONAZEPAM 1 MG: 1 TABLET ORAL at 08:14

## 2024-09-15 RX ADMIN — ATORVASTATIN CALCIUM 20 MG: 20 TABLET, FILM COATED ORAL at 20:16

## 2024-09-15 RX ADMIN — DULOXETINE HYDROCHLORIDE 60 MG: 30 CAPSULE, DELAYED RELEASE ORAL at 20:15

## 2024-09-15 RX ADMIN — LAMOTRIGINE 150 MG: 100 TABLET ORAL at 08:14

## 2024-09-15 RX ADMIN — Medication 2000 UNITS: at 08:14

## 2024-09-15 RX ADMIN — ASPIRIN 81 MG: 81 TABLET, COATED ORAL at 08:14

## 2024-09-15 RX ADMIN — CLONAZEPAM 1 MG: 1 TABLET ORAL at 20:16

## 2024-09-15 RX ADMIN — MIRTAZAPINE 45 MG: 15 TABLET, FILM COATED ORAL at 20:16

## 2024-09-15 ASSESSMENT — PAIN SCALES - GENERAL: PAINLEVEL_OUTOF10: 0

## 2024-09-16 PROCEDURE — 6370000000 HC RX 637 (ALT 250 FOR IP): Performed by: PSYCHIATRY & NEUROLOGY

## 2024-09-16 PROCEDURE — 6360000002 HC RX W HCPCS: Performed by: PSYCHIATRY & NEUROLOGY

## 2024-09-16 PROCEDURE — 1240000000 HC EMOTIONAL WELLNESS R&B

## 2024-09-16 RX ADMIN — ATORVASTATIN CALCIUM 20 MG: 20 TABLET, FILM COATED ORAL at 20:40

## 2024-09-16 RX ADMIN — CLONAZEPAM 1 MG: 1 TABLET ORAL at 14:38

## 2024-09-16 RX ADMIN — LAMOTRIGINE 150 MG: 100 TABLET ORAL at 08:32

## 2024-09-16 RX ADMIN — AMLODIPINE BESYLATE 2.5 MG: 5 TABLET ORAL at 08:33

## 2024-09-16 RX ADMIN — CLONAZEPAM 1 MG: 1 TABLET ORAL at 20:27

## 2024-09-16 RX ADMIN — LAMOTRIGINE 150 MG: 100 TABLET ORAL at 20:28

## 2024-09-16 RX ADMIN — ASPIRIN 81 MG: 81 TABLET, COATED ORAL at 08:32

## 2024-09-16 RX ADMIN — DULOXETINE HYDROCHLORIDE 60 MG: 30 CAPSULE, DELAYED RELEASE ORAL at 20:27

## 2024-09-16 RX ADMIN — CLONAZEPAM 1 MG: 1 TABLET ORAL at 08:33

## 2024-09-16 RX ADMIN — QUETIAPINE FUMARATE 200 MG: 100 TABLET ORAL at 20:28

## 2024-09-16 RX ADMIN — DULOXETINE HYDROCHLORIDE 60 MG: 30 CAPSULE, DELAYED RELEASE ORAL at 08:32

## 2024-09-16 RX ADMIN — HALOPERIDOL LACTATE 5 MG: 5 INJECTION, SOLUTION INTRAMUSCULAR at 21:01

## 2024-09-16 RX ADMIN — Medication 2000 UNITS: at 08:32

## 2024-09-16 RX ADMIN — MIRTAZAPINE 45 MG: 15 TABLET, FILM COATED ORAL at 20:28

## 2024-09-16 ASSESSMENT — PAIN SCALES - GENERAL: PAINLEVEL_OUTOF10: 0

## 2024-09-16 NOTE — GROUP NOTE
Art Therapy Group Progress Note    PATIENT SCHEDULED FOR GROUP AT: 1:00 PM     GROUP STOP TIME:  1:45 PM    ATTENDANCE: High (8/13 participants)     TOPIC / FOCUS:  Heart inventory     GOALS:  identify and label emotions related to experience, visualize intensity and prevalence of each emotion, practice emotional checking in, identify positive and appropriate coping skills, practicing CBT and DBT techniques such as cognitive reframing, encouraging interpersonal awareness, promoting emotional communication    Notes:  Pt was able to identify anxiety and colored half of their heart orange. Pt then felt the other half of their heart was empty and colored filled it with grey. Pt shared that they needed to find things to fill up the empty space. Pt had difficulty identifying coping skills or interests that might help them fill the void. Pt was able to completely color fill their image. Pt continues to display moments of confusion. Pt was supported and encouraged by nursing students during group, which they responded to positively.     Status After Intervention: slightly Improved, increased participation in art making and sharing in group    Participation Level: Interactive    Participation Quality: Appropriate      Speech:  normal      Thought Process/Content: Logical, confused at times      Affective Functioning: Flat      Mood: anxious and dysphoric      Level of consciousness:  Alert      Response to Learning: Able to verbalize current knowledge/experience      Endings: None Reported    Modes of Intervention: Education, Support, Socialization, Exploration, Clarifying, and Problem-solving      Discipline Responsible: Psychoeducational Specialist      Humza Nixon  Art Therapist, MA, ATR-P  Provisional Registered Art Therapist

## 2024-09-16 NOTE — GROUP NOTE
Group Therapy Note    Date: 9/16/2024    Group Start Time: 1500  Group End Time: 1600  Group Topic: Music Therapy    Toni Burger        Group Therapy Note    Attendees: 8/12    Group Focus: Music therapy group consisted of psychoeducation on cognitive distortions followed by group members identifying cognitive distortions within song lyrics of several different songs. The group may promote self-awareness of common thought patterns that can impact one's judgement and emotions.       Notes:  Pt did not attend group.      Discipline Responsible: /Counselor      Signature:  LUCIAN Dickson, LPC  Board-Certified Music Therapist  Licensed Professional Counselor

## 2024-09-16 NOTE — GROUP NOTE
Group Therapy Note    Date: 9/16/2024    Group Start Time: 0930  Group End Time: 1015  Group Topic: Recreational    [facility]    Jolly Clark        Group Therapy Note    Attendees: 7/13    Group: Anxiety Thumball    Focus: Patients used thumball to learn coping skills for anxiety, while increasing social skills. Group members were able to answer questions, listen and communicate with peers on handling anxious feelings. This group may help enhance social skills, coping skills, mood, and decrease stress and anxiety.          Notes: Patient did not attend group.     Recreational Therapist   Jolly Clark

## 2024-09-17 PROCEDURE — 6370000000 HC RX 637 (ALT 250 FOR IP): Performed by: PSYCHIATRY & NEUROLOGY

## 2024-09-17 PROCEDURE — 1240000000 HC EMOTIONAL WELLNESS R&B

## 2024-09-17 PROCEDURE — 99231 SBSQ HOSP IP/OBS SF/LOW 25: CPT | Performed by: PSYCHIATRY & NEUROLOGY

## 2024-09-17 RX ORDER — AMLODIPINE BESYLATE 5 MG/1
5 TABLET ORAL DAILY
Status: DISCONTINUED | OUTPATIENT
Start: 2024-09-18 | End: 2024-09-30 | Stop reason: HOSPADM

## 2024-09-17 RX ADMIN — MIRTAZAPINE 45 MG: 15 TABLET, FILM COATED ORAL at 20:14

## 2024-09-17 RX ADMIN — ASPIRIN 81 MG: 81 TABLET, COATED ORAL at 08:34

## 2024-09-17 RX ADMIN — DULOXETINE HYDROCHLORIDE 60 MG: 30 CAPSULE, DELAYED RELEASE ORAL at 08:33

## 2024-09-17 RX ADMIN — ATORVASTATIN CALCIUM 20 MG: 20 TABLET, FILM COATED ORAL at 20:13

## 2024-09-17 RX ADMIN — LAMOTRIGINE 150 MG: 100 TABLET ORAL at 08:33

## 2024-09-17 RX ADMIN — LORAZEPAM 1 MG: 1 TABLET ORAL at 11:17

## 2024-09-17 RX ADMIN — AMLODIPINE BESYLATE 2.5 MG: 5 TABLET ORAL at 08:33

## 2024-09-17 RX ADMIN — CLONAZEPAM 1 MG: 1 TABLET ORAL at 08:34

## 2024-09-17 RX ADMIN — QUETIAPINE FUMARATE 200 MG: 100 TABLET ORAL at 20:14

## 2024-09-17 RX ADMIN — DULOXETINE HYDROCHLORIDE 60 MG: 30 CAPSULE, DELAYED RELEASE ORAL at 20:14

## 2024-09-17 RX ADMIN — LAMOTRIGINE 150 MG: 100 TABLET ORAL at 21:00

## 2024-09-17 RX ADMIN — Medication 2000 UNITS: at 08:34

## 2024-09-17 RX ADMIN — CLONAZEPAM 1 MG: 1 TABLET ORAL at 21:00

## 2024-09-17 RX ADMIN — CLONAZEPAM 1 MG: 1 TABLET ORAL at 15:51

## 2024-09-17 ASSESSMENT — PAIN SCALES - GENERAL
PAINLEVEL_OUTOF10: 0
PAINLEVEL_OUTOF10: 0

## 2024-09-17 ASSESSMENT — PAIN SCALES - WONG BAKER
WONGBAKER_NUMERICALRESPONSE: NO HURT
WONGBAKER_NUMERICALRESPONSE: NO HURT

## 2024-09-17 NOTE — GROUP NOTE
Group Therapy Note    Date: 9/17/2024    Group Start Time: 0930  Group End Time: 1030  Group Topic: Music Therapy      Toni Burger        Group Therapy Note    Attendees: 11/15    Group Focus: Music therapy group explored themes of overcoming obstacles and hopes for the future utilizing a collaborative group songwriting exercise. Group members worked together to re-write lyrics to a well-known song while processing the lyrics they were contributing. The group's newly created song was then sung back with instrumentation. The group may promote self-expression, socialization, improve mood, and promote use of music as a coping skill.       Notes:  Pt left group and returned. Pt reported feeling \"so-so\" and \"antsy\" at the start of group. Pt engaged in music making in group and contributed a song marimar about anxiety. Pt did not make any negative self-statements in this group, which seems to be an improvement compared to prior music therapy groups.    Status After Intervention:  Unchanged    Participation Level: Active Listener and Interactive    Participation Quality: Appropriate and Attentive      Speech:  normal      Thought Process/Content: Logical      Affective Functioning: Congruent      Mood: \"so-so,\" \"antsy\"      Level of consciousness:  Alert and Attentive      Response to Learning: Able to verbalize current knowledge/experience      Endings: None Reported    Modes of Intervention: Support, Socialization, Exploration, and Media      Discipline Responsible: /Counselor      Signature:  LUCIAN Dickson, LPC  Board-Certified Music Therapist  Licensed Professional Counselor

## 2024-09-17 NOTE — GROUP NOTE
Group Therapy Note    Date: 9/17/2024    Group Start Time: 1500  Group End Time: 1545  Group Topic: Recreational    [facility]    Kirsten Clark        Group Therapy Note    Attendees: 9/14    Group: Goal setting flower   Focus: Recreational therapy group focused on the topic \"What can I do to better myself?\". Patients developed a goal setting flower, where they listed a goal in each petal that would inspire positive change. The group may help promote change and self-improvement, increase self-awareness, motivation, and focus, and decrease stress and anxiety.              Notes: Patient shared he felt \"anxious at the start of group\" and \"better\". Patient engaged in group by creating a goal flower. Patient shared he wanted to work on having a more positive mind and shared changing how he thinks well help. Therapist noticed patient was open to participating in group and didn't require encouragement to engage or did not make self-doubting statements has he have in previous recreational therapy group.    Status After Intervention:  Unchanged    Participation Level: Active Listener and Interactive    Participation Quality: Appropriate, Attentive, and Sharing      Speech:  normal      Thought Process/Content: Logical      Affective Functioning: Congruent      Mood: anxious      Level of consciousness:  Alert and Attentive      Response to Learning: Able to verbalize current knowledge/experience      Endings: None Reported    Modes of Intervention: Socialization, Clarifying, and Activity      Recreational Therapist  KIRSTEN CLARK     all other ROS negative except as per HPI
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
## 2024-09-18 PROCEDURE — 1240000000 HC EMOTIONAL WELLNESS R&B

## 2024-09-18 PROCEDURE — 99232 SBSQ HOSP IP/OBS MODERATE 35: CPT | Performed by: PSYCHIATRY & NEUROLOGY

## 2024-09-18 PROCEDURE — 6370000000 HC RX 637 (ALT 250 FOR IP): Performed by: PSYCHIATRY & NEUROLOGY

## 2024-09-18 RX ADMIN — CLONAZEPAM 1 MG: 1 TABLET ORAL at 08:21

## 2024-09-18 RX ADMIN — LAMOTRIGINE 150 MG: 100 TABLET ORAL at 20:45

## 2024-09-18 RX ADMIN — COLCHICINE 0.6 MG: 0.6 CAPSULE ORAL at 10:08

## 2024-09-18 RX ADMIN — MIRTAZAPINE 45 MG: 15 TABLET, FILM COATED ORAL at 20:45

## 2024-09-18 RX ADMIN — Medication 2000 UNITS: at 08:21

## 2024-09-18 RX ADMIN — ASPIRIN 81 MG: 81 TABLET, COATED ORAL at 08:22

## 2024-09-18 RX ADMIN — DULOXETINE HYDROCHLORIDE 60 MG: 30 CAPSULE, DELAYED RELEASE ORAL at 20:45

## 2024-09-18 RX ADMIN — DULOXETINE HYDROCHLORIDE 60 MG: 30 CAPSULE, DELAYED RELEASE ORAL at 08:21

## 2024-09-18 RX ADMIN — AMLODIPINE BESYLATE 5 MG: 5 TABLET ORAL at 08:22

## 2024-09-18 RX ADMIN — CLONAZEPAM 1 MG: 1 TABLET ORAL at 20:45

## 2024-09-18 RX ADMIN — QUETIAPINE FUMARATE 200 MG: 100 TABLET ORAL at 20:45

## 2024-09-18 RX ADMIN — CLONAZEPAM 1 MG: 1 TABLET ORAL at 14:35

## 2024-09-18 RX ADMIN — LAMOTRIGINE 150 MG: 100 TABLET ORAL at 08:22

## 2024-09-18 RX ADMIN — ATORVASTATIN CALCIUM 20 MG: 20 TABLET, FILM COATED ORAL at 20:45

## 2024-09-18 ASSESSMENT — PAIN DESCRIPTION - ORIENTATION: ORIENTATION: LEFT

## 2024-09-18 ASSESSMENT — PAIN DESCRIPTION - DESCRIPTORS: DESCRIPTORS: ACHING

## 2024-09-18 ASSESSMENT — PAIN DESCRIPTION - LOCATION: LOCATION: KNEE

## 2024-09-18 ASSESSMENT — PAIN SCALES - GENERAL: PAINLEVEL_OUTOF10: 4

## 2024-09-18 ASSESSMENT — PAIN SCALES - WONG BAKER: WONGBAKER_NUMERICALRESPONSE: NO HURT

## 2024-09-18 ASSESSMENT — PAIN - FUNCTIONAL ASSESSMENT: PAIN_FUNCTIONAL_ASSESSMENT: ACTIVITIES ARE NOT PREVENTED

## 2024-09-18 NOTE — BSMART NOTE
Axis I: Major depressive disorder recurrent without psychotic symptoms.  Generalized anxiety disorder with panic-like symptoms.  Axis II: Noncontributory.  Axis III: Status post remote craniotomy for blood clot extraction.  (1964).  History of nonalcoholic liver disease currently with normal hepatic function tests.  History of hepatitis C without coma, chronic.  Hydrocele by history.  Dyslipidemia by history.  Chronic history of lumbar and cervical disc disease with secondary chronic pain.  Status post left rotator cuff surgical repair.  Status post left ulnar nerve transport.  Status post lumbar discectomy L4-L5.        Pt.'s case was discussed in staffing and treatment team. Pt  stated he was doing fine until he felt like something just jumped into him causing him to feel anxious and wanted to kill self and others. Pt stated today he no longer feels that way. Pt stated he does feel anxious and does not want to harm self nor his spouse. Pt stated he is not sure way he said  that to his spouse on her last visit. Pt report to feeling less anxious. Pt was given positive feedback and positive verbal praise for his efforts to maintain self-control. Pt was calm not anxious and not impulsive. Pt.continues to be depressed and has limited insight.  Pt will be provided support towards dc planning.             Kathie Gonzales HS-BCP MA, LMHP-R

## 2024-09-18 NOTE — GROUP NOTE
Art Therapy Group Progress Note    PATIENT SCHEDULED FOR GROUP AT: 1:08 PM     GROUP STOP TIME:  1:56 PM    ATTENDANCE: High (10/12 participants)     TOPIC / FOCUS:  Gratitude leaves and group tree     GOALS:  define gratitude, identify 3 things to be grateful for, increase positive feelings, decrease stress, increase emotional communication, increase self-awareness, practice gratitude, encourage creativity, encourage positive thinking, encourage focus, encourage gratitude practice and positive coping skills    Notes:  Pt reported feeling fine/OK at the start of group. Pt was able to color in 3 different leaves. Pt shared that one was colored to represent the changing of the seasons. The Pt added their leaves to the group tree and contributed to group discussion. Pt expressed gratitude for their dogs.     Status After Intervention:  Improved    Participation Level: Interactive    Participation Quality: Appropriate and Sharing      Speech:  normal      Thought Process/Content: Logical      Affective Functioning: Congruent      Mood: anxious and euthymic      Level of consciousness:  Alert      Response to Learning: Able to verbalize current knowledge/experience      Endings: None Reported    Modes of Intervention: Education, Support, and Socialization      Discipline Responsible: Psychoeducational Specialist    Humza Nixon  Art Therapist, MA, ATR-P  Provisional Registered Art Therapist

## 2024-09-18 NOTE — GROUP NOTE
Group Therapy Note    Date: 9/18/2024    Group Start Time: 1500  Group End Time: 1600  Group Topic: Recreational    [facility]    Kirsten Clark        Group Therapy Note    Attendees: 10/13    Group: Stress and Anger Bingo    Focus: Recreational therapy groups engaged patients through a game that focused on stress. Patients examined: external stressors, internal stressors, physical stress symptoms, emotional/behavioral stress symptoms, and stress relievers. Group may help enhance social and coping skills, and decrease stress, depression and anxiety            Notes: Patient was able to share things he is grateful for at the start of group. Patient was engaged and contributed to discussions. Patient was able to identify stressors and symptoms from stress.     Status After Intervention:  Improved    Participation Level: Active Listener and Interactive    Participation Quality: Appropriate, Attentive, and Sharing      Speech:  normal      Thought Process/Content: Logical      Affective Functioning: Congruent      Mood: calm      Level of consciousness:  Alert and Attentive      Response to Learning: Able to verbalize current knowledge/experience      Endings: None Reported    Modes of Intervention: Education, Socialization, Problem-solving, and Activity      Recreational Therapist  KIRSTEN CLARK

## 2024-09-18 NOTE — GROUP NOTE
Group Therapy Note    Date: 9/18/2024    Group Start Time: 1400  Group End Time: 1445  Group Topic: Music Therapy      Toni Burger        Group Therapy Note    Attendees: 10/14    Group Focus: Music therapy group consisted of collaborative music making with a focus on group drumming and instrumental improvisation. The group also discussed problem-solving as it related to the music making experiences and how that could relate to life. The group may promote improved mood, energy, sense of self-efficacy, and use of music as a coping skill.       Notes:  Pt reported feeling \"okay\" at the start of group and participated appropriately throughout the group. Pt engaged in music making trying more than one instrument in group today. This therapist did not hear patient make any negative self-statements in this group, which seems to be an improvement compared to a number of prior music therapy groups.    Status After Intervention:  Unchanged    Participation Level: Interactive    Participation Quality: Appropriate and Attentive      Speech:  normal      Thought Process/Content: Logical      Affective Functioning: Congruent      Mood: Calm      Level of consciousness:  Alert and Attentive      Response to Learning: Able to verbalize current knowledge/experience      Endings: None Reported    Modes of Intervention: Support, Socialization, Exploration, and Media      Discipline Responsible: /Counselor      Signature:  LUCIAN Dickson, LPC  Board-Certified Music Therapist  Licensed Professional Counselor

## 2024-09-19 PROCEDURE — 6370000000 HC RX 637 (ALT 250 FOR IP): Performed by: PSYCHIATRY & NEUROLOGY

## 2024-09-19 PROCEDURE — 99231 SBSQ HOSP IP/OBS SF/LOW 25: CPT | Performed by: PSYCHIATRY & NEUROLOGY

## 2024-09-19 PROCEDURE — 1240000000 HC EMOTIONAL WELLNESS R&B

## 2024-09-19 RX ADMIN — MIRTAZAPINE 45 MG: 15 TABLET, FILM COATED ORAL at 20:07

## 2024-09-19 RX ADMIN — LAMOTRIGINE 150 MG: 100 TABLET ORAL at 20:07

## 2024-09-19 RX ADMIN — DULOXETINE HYDROCHLORIDE 60 MG: 30 CAPSULE, DELAYED RELEASE ORAL at 08:46

## 2024-09-19 RX ADMIN — LAMOTRIGINE 150 MG: 100 TABLET ORAL at 08:47

## 2024-09-19 RX ADMIN — QUETIAPINE FUMARATE 200 MG: 100 TABLET ORAL at 20:08

## 2024-09-19 RX ADMIN — ASPIRIN 81 MG: 81 TABLET, COATED ORAL at 08:46

## 2024-09-19 RX ADMIN — ATORVASTATIN CALCIUM 20 MG: 20 TABLET, FILM COATED ORAL at 20:08

## 2024-09-19 RX ADMIN — AMLODIPINE BESYLATE 5 MG: 5 TABLET ORAL at 08:47

## 2024-09-19 RX ADMIN — DULOXETINE HYDROCHLORIDE 60 MG: 30 CAPSULE, DELAYED RELEASE ORAL at 20:07

## 2024-09-19 RX ADMIN — CLONAZEPAM 1 MG: 1 TABLET ORAL at 15:39

## 2024-09-19 RX ADMIN — CLONAZEPAM 1 MG: 1 TABLET ORAL at 20:07

## 2024-09-19 RX ADMIN — CLONAZEPAM 1 MG: 1 TABLET ORAL at 08:47

## 2024-09-19 RX ADMIN — Medication 2000 UNITS: at 08:48

## 2024-09-19 ASSESSMENT — PAIN SCALES - GENERAL
PAINLEVEL_OUTOF10: 0
PAINLEVEL_OUTOF10: 0

## 2024-09-19 NOTE — GROUP NOTE
Group Therapy Note    Date: 9/19/2024    Group Start Time: 1430  Group End Time: 1500  Group Topic: Psychoeducation        Haley Martinez        Group Therapy Note    Attendees: 9  Discuss Emotions and feelings. To identify coping strategies to use during challenging moments.            Status After Intervention:  Unchanged    Participation Level: Active Listener and Interactive    Participation Quality: Appropriate, Attentive, and Sharing      Speech:  normal      Thought Process/Content: Logical      Affective Functioning: Congruent      Mood: euthymic      Response to Learning: Able to verbalize current knowledge/experience, Able to verbalize/acknowledge new learning, and Able to retain information      Endings: None Reported    Modes of Intervention: Education and Support      Discipline Responsible: /Counselor      Signature:  Haley Martinez

## 2024-09-19 NOTE — GROUP NOTE
Group Therapy Note    Date: 9/19/2024    Group Start Time: 0930  Group End Time: 1030  Group Topic: Music Therapy      Toni Burger        Group Therapy Note    Attendees: 8/14    Group Focus: Music therapy group started with a bell choir intervention where group members were given lyrics color coded to resonator bells to support reality orientation, focus, and stress reduction. The group continued with an expressive art intervention utilizing a metaphor from the song where group members were prompted to identify something that is clouding them, their goal or what they are working towards, and then identify coping skills and/or items that can assist them or connect them towards their goal.        Notes:  Pt reported having \"a lot of anxiety\" at the start of group and received support from peers. Pt engaged in music making in a focused manner. Pt did not engage in the expressive arts intervention, stating he felt overwhelmed by it. Therapist stated he could color the paper instead as another option and pt chose to sit and listen to the group. Pt remained calm throughout the group.    Status After Intervention:  Unchanged    Participation Level: Active Listener and Interactive    Participation Quality: Appropriate and Attentive      Speech:  normal      Thought Process/Content: Logical      Affective Functioning: Congruent      Mood: anxious      Level of consciousness:  Alert and Attentive      Response to Learning: Able to verbalize current knowledge/experience      Endings: None Reported    Modes of Intervention: Support, Socialization, Exploration, and Media      Discipline Responsible: /Counselor      Signature:  LUCIAN Dickson, JOVANNA  Board-Certified Music Therapist  Licensed Professional Counselor

## 2024-09-19 NOTE — GROUP NOTE
Group Therapy Note    Date: 9/19/2024    Group Start Time: 1500  Group End Time: 1600  Group Topic: Recreational    [facility]    Kirsten Clark        Group Therapy Note    Attendees: 8/14    Group Type: Jeopardy     Group Focus: Recreational therapy group engaged patients through a group game. RT placed patients into two teams to encourage team building. RT used topics that focused on self-care, coping skills, identifying emotions, and communication terms. The group can assist in increasing positive mood, self-care, social and problem-solving skills, and reduce stress.             Notes: Patient engaged in group with moderate energy. Patient expressed feeling a little \"anxious but okay\" at the start of group. Patient expressed being hopeful about being discharge and discussed having a routine. PT was able to recognize a a variety of calming techniques.     Status After Intervention:  Improved    Participation Level: Active Listener and Interactive    Participation Quality: Appropriate, Attentive, and Sharing      Speech:  normal      Thought Process/Content: Logical      Affective Functioning: Congruent      Mood: euthymic      Level of consciousness:  Alert and Attentive      Response to Learning: Able to verbalize current knowledge/experience      Endings: None Reported    Modes of Intervention: Education, Socialization, Problem-solving, and Activity      Recreational Therapist  KIRSTEN CLARK

## 2024-09-20 PROCEDURE — 6370000000 HC RX 637 (ALT 250 FOR IP): Performed by: PSYCHIATRY & NEUROLOGY

## 2024-09-20 PROCEDURE — 99231 SBSQ HOSP IP/OBS SF/LOW 25: CPT | Performed by: PSYCHIATRY & NEUROLOGY

## 2024-09-20 PROCEDURE — 1240000000 HC EMOTIONAL WELLNESS R&B

## 2024-09-20 RX ADMIN — QUETIAPINE FUMARATE 200 MG: 100 TABLET ORAL at 20:14

## 2024-09-20 RX ADMIN — ASPIRIN 81 MG: 81 TABLET, COATED ORAL at 08:28

## 2024-09-20 RX ADMIN — CLONAZEPAM 1 MG: 1 TABLET ORAL at 08:28

## 2024-09-20 RX ADMIN — CLONAZEPAM 1 MG: 1 TABLET ORAL at 14:32

## 2024-09-20 RX ADMIN — MIRTAZAPINE 45 MG: 15 TABLET, FILM COATED ORAL at 20:14

## 2024-09-20 RX ADMIN — Medication 2000 UNITS: at 08:28

## 2024-09-20 RX ADMIN — AMLODIPINE BESYLATE 5 MG: 5 TABLET ORAL at 08:28

## 2024-09-20 RX ADMIN — CLONAZEPAM 1 MG: 1 TABLET ORAL at 20:14

## 2024-09-20 RX ADMIN — LAMOTRIGINE 150 MG: 100 TABLET ORAL at 08:28

## 2024-09-20 RX ADMIN — ATORVASTATIN CALCIUM 20 MG: 20 TABLET, FILM COATED ORAL at 20:14

## 2024-09-20 RX ADMIN — DULOXETINE HYDROCHLORIDE 60 MG: 30 CAPSULE, DELAYED RELEASE ORAL at 20:14

## 2024-09-20 RX ADMIN — DULOXETINE HYDROCHLORIDE 60 MG: 30 CAPSULE, DELAYED RELEASE ORAL at 08:28

## 2024-09-20 RX ADMIN — LAMOTRIGINE 150 MG: 100 TABLET ORAL at 20:14

## 2024-09-20 RX ADMIN — HYDROXYZINE HYDROCHLORIDE 50 MG: 50 TABLET, FILM COATED ORAL at 16:31

## 2024-09-20 ASSESSMENT — PAIN SCALES - GENERAL
PAINLEVEL_OUTOF10: 0
PAINLEVEL_OUTOF10: 0

## 2024-09-20 ASSESSMENT — PAIN SCALES - WONG BAKER: WONGBAKER_NUMERICALRESPONSE: NO HURT

## 2024-09-20 NOTE — BH NOTE
Patient continuous LOS has been modified to LOS while awake only. Patient has no S/S of distress apparent.

## 2024-09-20 NOTE — GROUP NOTE
Group Therapy Note    Date: 9/20/2024    Group Start Time: 1500  Group End Time: 1600  Group Topic: Recreational    [facility]    Kirsten Clark        Group Therapy Note    Attendees: 9/12    Group: Stretching   Group Focus: This recreational group engaged patients in physical activity. Recreational therapists led group members in guided stretches. After stretches, patients discussed creating daily routines and the importance having routines. Patient also discussed benefits of leisure engagement. This group may help reduce feelings of depression and stress and enhance mood and over emotional well-being.          Notes: Patient participated in group through completing guided exercises and sharing. Patient left group early during debriefing, about half way through.     Status After Intervention:  Improved    Participation Level: Active Listener and Interactive    Participation Quality: Appropriate, Attentive, and Sharing      Speech:  normal      Thought Process/Content: Logical      Affective Functioning: Congruent      Mood: anxious, calm      Level of consciousness:  Alert and Attentive      Response to Learning: Able to verbalize current knowledge/experience      Endings: None Reported    Modes of Intervention: Socialization, Movement, and Media      Recreational Therapist  KIRSTEN CLARK

## 2024-09-20 NOTE — GROUP NOTE
Art Therapy Group Progress Note    PATIENT SCHEDULED FOR GROUP AT: 1:05 PM     GROUP STOP TIME:  1:54 PM    ATTENDANCE: High (7/10 participants)     TOPIC / FOCUS:  Visual Response to music     GOALS:  promote self-expression, encourage self-awareness, practice stress reduction techniques, encourage creativity, accessing inner resources, provide space for leisure, encourage effective coping skills, increase feelings of fun and lisa, practice mindfulness     Notes:  Pt was resistant to trying to listen to music and make art. This writer encouraged the Pt verbally. This writer also attempted to work at the Pt's table to encourage particiaption. The Pt shared that they could see how this writer's hand was following the beat. Pt shared their experience of listening to the music.     Status After Intervention:  Unchanged    Participation Level: None    Participation Quality: Resistant      Speech:  normal      Thought Process/Content: Logical      Affective Functioning: Congruent      Mood: anxious      Level of consciousness:  Alert      Response to Learning: Able to verbalize current knowledge/experience      Endings: None Reported    Modes of Intervention: Education, Socialization, Exploration, and Media      Discipline Responsible: Psychoeducational Specialist      Humza Nixon  Art Therapist, MA, ATR-P  Provisional Registered Art Therapist

## 2024-09-20 NOTE — GROUP NOTE
Art Therapy Group Progress Note    PATIENT SCHEDULED FOR GROUP AT: 9:30 AM     GROUP STOP TIME:  10:30 AM    ATTENDANCE: Moderate (8/14 participants)     TOPIC / FOCUS: Mindfulness - Motivational Word Mandala     GOALS: define mindfulness, identify current feelings, encourage healthy emotional management, practicing positive coping skills, encourage focus, promote self-awareness, reduce stress and anxiety     Notes:  Pt was engaged and participated in art making. Pt completed their mandala, suggesting some improvement. Pt appeared to be responding positively to peer support from their tablemate. Pt reported they were feeling anxious. Pt selected calm as their word of the day, which they spelled as \"com\". Pt reported feeling calmer after completing their art, finding the motion of coloring to be soothing.     Status After Intervention:  Improved    Participation Level: Interactive    Participation Quality: Appropriate      Speech:  normal      Thought Process/Content: Logical      Affective Functioning: Congruent      Mood: anxious and euthymic      Level of consciousness:  Alert and Attentive      Response to Learning: Able to verbalize current knowledge/experience      Endings: None Reported    Modes of Intervention: Education, Support, Socialization, Exploration, Clarifying, and Problem-solving      Discipline Responsible: Psychoeducational Specialist      Humza Nixon  Art Therapist, MA, ATR-P  Provisional Registered Art Therapist

## 2024-09-21 PROCEDURE — 6370000000 HC RX 637 (ALT 250 FOR IP): Performed by: PSYCHIATRY & NEUROLOGY

## 2024-09-21 PROCEDURE — 1240000000 HC EMOTIONAL WELLNESS R&B

## 2024-09-21 PROCEDURE — 99231 SBSQ HOSP IP/OBS SF/LOW 25: CPT | Performed by: PSYCHIATRY & NEUROLOGY

## 2024-09-21 RX ADMIN — ASPIRIN 81 MG: 81 TABLET, COATED ORAL at 07:52

## 2024-09-21 RX ADMIN — MIRTAZAPINE 45 MG: 15 TABLET, FILM COATED ORAL at 20:39

## 2024-09-21 RX ADMIN — DULOXETINE HYDROCHLORIDE 60 MG: 30 CAPSULE, DELAYED RELEASE ORAL at 20:39

## 2024-09-21 RX ADMIN — DULOXETINE HYDROCHLORIDE 60 MG: 30 CAPSULE, DELAYED RELEASE ORAL at 07:53

## 2024-09-21 RX ADMIN — CLONAZEPAM 1 MG: 1 TABLET ORAL at 07:53

## 2024-09-21 RX ADMIN — CLONAZEPAM 1 MG: 1 TABLET ORAL at 14:37

## 2024-09-21 RX ADMIN — QUETIAPINE FUMARATE 200 MG: 100 TABLET ORAL at 20:39

## 2024-09-21 RX ADMIN — LAMOTRIGINE 150 MG: 100 TABLET ORAL at 20:39

## 2024-09-21 RX ADMIN — LAMOTRIGINE 150 MG: 100 TABLET ORAL at 07:52

## 2024-09-21 RX ADMIN — Medication 2000 UNITS: at 07:52

## 2024-09-21 RX ADMIN — AMLODIPINE BESYLATE 5 MG: 5 TABLET ORAL at 07:52

## 2024-09-21 RX ADMIN — ATORVASTATIN CALCIUM 20 MG: 20 TABLET, FILM COATED ORAL at 20:39

## 2024-09-21 RX ADMIN — CLONAZEPAM 1 MG: 1 TABLET ORAL at 20:39

## 2024-09-21 ASSESSMENT — PAIN SCALES - GENERAL
PAINLEVEL_OUTOF10: 0
PAINLEVEL_OUTOF10: 0

## 2024-09-21 ASSESSMENT — PAIN SCALES - WONG BAKER: WONGBAKER_NUMERICALRESPONSE: NO HURT

## 2024-09-21 NOTE — GROUP NOTE
Art Therapy Group Progress Note    PATIENT SCHEDULED FOR GROUP AT: 12:18 PM     GROUP STOP TIME:  1:00 PM    ATTENDANCE: High (7/11 participants)     TOPIC / FOCUS:  Neurographic art making     GOALS:  encourage connections between thoughts and feelings, enhance problem solving, reduce feelings of stress and anxiety, encourage focus, practice meditation, increase feelings of autonomy, encourage emotional expression, provide healthy coping skills     Notes:  Pt had difficulty with comprehension and confusion. Pt was unable to follow directions even after one-on-one demonstration with this writer. This writer encouraged the Pt to add color inside the demonstrations. Pt was preoccupied with previous art activities and \"I can't\". Pt received one-on-one support and encouragement from a nursing student. Pt participation was minimal and low energy.     Status After Intervention:  Unchanged    Participation Level: Minimal    Participation Quality: Resistant      Speech:  normal      Thought Process/Content: confused, concrete, negative       Affective Functioning: Flat      Mood: anxious and dysphoric      Level of consciousness:  Alert and Preoccupied      Response to Learning: Able to verbalize current knowledge/experience and Resistant      Endings: None Reported    Modes of Intervention: Education, Support, Socialization, Exploration, Clarifying, and Problem-solving      Discipline Responsible: Psychoeducational Specialist    Humza Nixon  Art Therapist, MA, ATR-P  Provisional Registered Art Therapist

## 2024-09-22 PROCEDURE — 6370000000 HC RX 637 (ALT 250 FOR IP): Performed by: PSYCHIATRY & NEUROLOGY

## 2024-09-22 PROCEDURE — 1240000000 HC EMOTIONAL WELLNESS R&B

## 2024-09-22 RX ADMIN — MIRTAZAPINE 45 MG: 15 TABLET, FILM COATED ORAL at 20:00

## 2024-09-22 RX ADMIN — CLONAZEPAM 1 MG: 1 TABLET ORAL at 14:35

## 2024-09-22 RX ADMIN — ASPIRIN 81 MG: 81 TABLET, COATED ORAL at 08:13

## 2024-09-22 RX ADMIN — LAMOTRIGINE 150 MG: 100 TABLET ORAL at 21:00

## 2024-09-22 RX ADMIN — AMLODIPINE BESYLATE 5 MG: 5 TABLET ORAL at 08:13

## 2024-09-22 RX ADMIN — DULOXETINE HYDROCHLORIDE 60 MG: 30 CAPSULE, DELAYED RELEASE ORAL at 08:14

## 2024-09-22 RX ADMIN — CLONAZEPAM 1 MG: 1 TABLET ORAL at 21:00

## 2024-09-22 RX ADMIN — LAMOTRIGINE 150 MG: 100 TABLET ORAL at 08:12

## 2024-09-22 RX ADMIN — Medication 2000 UNITS: at 08:13

## 2024-09-22 RX ADMIN — DULOXETINE HYDROCHLORIDE 60 MG: 30 CAPSULE, DELAYED RELEASE ORAL at 20:01

## 2024-09-22 RX ADMIN — CLONAZEPAM 1 MG: 1 TABLET ORAL at 08:14

## 2024-09-22 RX ADMIN — ATORVASTATIN CALCIUM 20 MG: 20 TABLET, FILM COATED ORAL at 20:01

## 2024-09-22 RX ADMIN — QUETIAPINE FUMARATE 200 MG: 100 TABLET ORAL at 20:01

## 2024-09-22 ASSESSMENT — PAIN SCALES - WONG BAKER
WONGBAKER_NUMERICALRESPONSE: NO HURT
WONGBAKER_NUMERICALRESPONSE: NO HURT

## 2024-09-22 ASSESSMENT — PAIN SCALES - GENERAL
PAINLEVEL_OUTOF10: 0

## 2024-09-23 PROCEDURE — 6370000000 HC RX 637 (ALT 250 FOR IP): Performed by: PSYCHIATRY & NEUROLOGY

## 2024-09-23 PROCEDURE — 1240000000 HC EMOTIONAL WELLNESS R&B

## 2024-09-23 RX ADMIN — LAMOTRIGINE 150 MG: 100 TABLET ORAL at 08:21

## 2024-09-23 RX ADMIN — MIRTAZAPINE 45 MG: 15 TABLET, FILM COATED ORAL at 20:26

## 2024-09-23 RX ADMIN — DULOXETINE HYDROCHLORIDE 60 MG: 30 CAPSULE, DELAYED RELEASE ORAL at 08:21

## 2024-09-23 RX ADMIN — CLONAZEPAM 1 MG: 1 TABLET ORAL at 14:58

## 2024-09-23 RX ADMIN — ASPIRIN 81 MG: 81 TABLET, COATED ORAL at 08:23

## 2024-09-23 RX ADMIN — DULOXETINE HYDROCHLORIDE 60 MG: 30 CAPSULE, DELAYED RELEASE ORAL at 20:26

## 2024-09-23 RX ADMIN — LAMOTRIGINE 150 MG: 100 TABLET ORAL at 21:00

## 2024-09-23 RX ADMIN — CLONAZEPAM 1 MG: 1 TABLET ORAL at 21:00

## 2024-09-23 RX ADMIN — Medication 2000 UNITS: at 08:21

## 2024-09-23 RX ADMIN — QUETIAPINE FUMARATE 200 MG: 100 TABLET ORAL at 20:26

## 2024-09-23 RX ADMIN — CLONAZEPAM 1 MG: 1 TABLET ORAL at 08:22

## 2024-09-23 RX ADMIN — ATORVASTATIN CALCIUM 20 MG: 20 TABLET, FILM COATED ORAL at 20:26

## 2024-09-23 RX ADMIN — AMLODIPINE BESYLATE 5 MG: 5 TABLET ORAL at 08:22

## 2024-09-23 ASSESSMENT — PAIN SCALES - GENERAL
PAINLEVEL_OUTOF10: 0

## 2024-09-23 ASSESSMENT — PAIN SCALES - WONG BAKER
WONGBAKER_NUMERICALRESPONSE: NO HURT
WONGBAKER_NUMERICALRESPONSE: NO HURT

## 2024-09-23 NOTE — GROUP NOTE
Art Therapy Group Progress Note    PATIENT SCHEDULED FOR GROUP AT: 1:06 PM     GROUP STOP TIME:  1:46 PM    ATTENDANCE: High (7/11 participants)     TOPIC / FOCUS:  Web of Compliments      GOALS:  define compliments, create a personal list of compliments, foster positive sense of self, encourage positive behaviors, increase feelings of self-esteem, promote positive thinking, bolster emotional connection, identify strengths, encourage positive inner dialogue, reduce feelings of stress and anxiety    Notes:  Pt began making a comment about \"your student who doesn't understand.\" Pt was encouraged to list their compliments. Pt wrote that their wife loves them and is bringing them clothing. Pt also wrote that the staff started off the morning with compliments. Pt was unable to identify one kind thing to say about themselves.     Status After Intervention:  Unchanged    Participation Level: Minimal    Participation Quality: Appropriate pt attempted their best.       Speech:  normal      Thought Process/Content: Logical, concrete, confused       Affective Functioning: Flat      Mood: dysphoric      Level of consciousness:  Alert      Response to Learning: Able to verbalize current knowledge/experience and Resistant      Endings: None Reported    Modes of Intervention: Education, Support, and Socialization      Discipline Responsible: Psychoeducational Specialist      Humza Nixon  Art Therapist, MA, ATR-P  Provisional Registered Art Therapist

## 2024-09-23 NOTE — GROUP NOTE
Group Therapy Note    Date: 9/23/2024    Group Start Time: 0940  Group End Time: 1030  Group Topic: Recreational    [facility]    Kirsten Clark        Group Therapy Note    Attendees: 8/13    Group: Self Care Plan   Focus: Patients developed a self-care routine that involves boosting emotional/spiritual, physical, and mental(mind) health. Patients also discussed what self-care looks like to them and the different types of self-care. This group may help enhance focus, mindfulness, productivity, self-awareness, and decrease stress, anxiety, and depression.          Notes: Patient reported feeling \"anxious\" during check-in. Patient made an attempt to fill out self-care plan, therapist observed pt having difficulties and would provide encouragement. Patient's self-care plan focused on bettering his marriage and having positive thoughts. Pt provided self-doubting and negative comments, therapist would encourage more positive thoughts.      Status After Intervention:  Unchanged    Participation Level: Active Listener and Interactive    Participation Quality: Appropriate, Attentive, and Sharing      Speech:  normal      Thought Process/Content: self-doubting      Affective Functioning: Congruent      Mood: anxious      Level of consciousness:  Alert and Attentive      Response to Learning: Able to verbalize current knowledge/experience      Endings: None Reported    Modes of Intervention: Socialization, Clarifying, and Activity      Recreational Therapist  KIRSTEN CLARK

## 2024-09-23 NOTE — GROUP NOTE
Group Therapy Note    Date: 9/23/2024    Group Start Time: 1500  Group End Time: 1600  Group Topic: Music Therapy      Toni Burger        Group Therapy Note    Attendees: 8/12    Group Focus: Music therapy group explored the topic of music and emotions. The group started with psychoeducation regarding the DBT skills of Emotions on a Continuum and Emotions and their Opposites. The group continued with group members selecting a variety of emotions to represent musically. The group may promote emotion regulation and identification, tolerance of negative emotions, and use of music as an outlet for self-expression.        Notes:  Pt did not attend group, reporting that he was feeling anxious.      Discipline Responsible: /Counselor      Signature:  LUCIAN Dickson, LPC  Board-Certified Music Therapist  Licensed Professional Counselor

## 2024-09-24 PROCEDURE — 6370000000 HC RX 637 (ALT 250 FOR IP): Performed by: PSYCHIATRY & NEUROLOGY

## 2024-09-24 PROCEDURE — 1240000000 HC EMOTIONAL WELLNESS R&B

## 2024-09-24 RX ADMIN — Medication 2000 UNITS: at 09:26

## 2024-09-24 RX ADMIN — ATORVASTATIN CALCIUM 20 MG: 20 TABLET, FILM COATED ORAL at 20:28

## 2024-09-24 RX ADMIN — CLONAZEPAM 1 MG: 1 TABLET ORAL at 20:29

## 2024-09-24 RX ADMIN — LAMOTRIGINE 150 MG: 100 TABLET ORAL at 09:21

## 2024-09-24 RX ADMIN — MIRTAZAPINE 45 MG: 15 TABLET, FILM COATED ORAL at 20:28

## 2024-09-24 RX ADMIN — LAMOTRIGINE 150 MG: 100 TABLET ORAL at 20:28

## 2024-09-24 RX ADMIN — CLONAZEPAM 1 MG: 1 TABLET ORAL at 09:26

## 2024-09-24 RX ADMIN — CLONAZEPAM 1 MG: 1 TABLET ORAL at 14:33

## 2024-09-24 RX ADMIN — ASPIRIN 81 MG: 81 TABLET, COATED ORAL at 09:26

## 2024-09-24 RX ADMIN — AMLODIPINE BESYLATE 5 MG: 5 TABLET ORAL at 09:26

## 2024-09-24 RX ADMIN — QUETIAPINE FUMARATE 200 MG: 100 TABLET ORAL at 20:28

## 2024-09-24 RX ADMIN — DULOXETINE HYDROCHLORIDE 60 MG: 30 CAPSULE, DELAYED RELEASE ORAL at 20:28

## 2024-09-24 RX ADMIN — DULOXETINE HYDROCHLORIDE 60 MG: 30 CAPSULE, DELAYED RELEASE ORAL at 09:26

## 2024-09-24 ASSESSMENT — PAIN SCALES - GENERAL: PAINLEVEL_OUTOF10: 0

## 2024-09-24 NOTE — GROUP NOTE
Group Therapy Note    Date: 9/24/2024    Group Start Time: 0930  Group End Time: 1050  Group Topic: Music Therapy      Toni Burger        Group Therapy Note    Attendees: 7/13    Group Focus: Music therapy group consisted of a music-assisted relaxation incorporating elements of stretching, affirmation statements, and imagining a peaceful place. After the relaxation, group members processed their experiences. Group continued with exploring the theme of change utilizing marimar analysis of two songs about change. The group may promote use of music as a coping skill and for self-awareness.       Notes:  Pt reported feeling \"anxious\" at the start of group. Pt left group and returned. Pt demonstrated active listening and made supportive comments to a peer who shared in group.    Status After Intervention:  Unchanged    Participation Level: Interactive    Participation Quality: Appropriate, Attentive, and Supportive      Speech:  normal      Thought Process/Content: Logical      Affective Functioning: Congruent      Mood: anxious      Level of consciousness:  Alert and Attentive      Response to Learning: Able to verbalize current knowledge/experience      Endings: None Reported    Modes of Intervention: Support, Socialization, Exploration, and Media      Discipline Responsible: /Counselor      Signature:  LUCIAN Dickson, LPC  Board-Certified Music Therapist  Licensed Professional Counselor

## 2024-09-24 NOTE — GROUP NOTE
Art Therapy Group Progress Note    PATIENT SCHEDULED FOR GROUP AT: 1:10 PM     GROUP STOP TIME:  2:00 PM  \  ATTENDANCE: Moderate (7/13 participants)     TOPIC / FOCUS:  Nancy in the Rough     GOALS:  promote positive thinking skills, encourage self-esteem, identify positive traits in self, develop communication skills, promote goal setting skills, encourage focus, increase self-awareness, promote creativity    Notes:  Pt was able to follow directions. Pt chose kind, respectful, and helpful as their nancy in the rough qualities. Pt used 3 different colors. Pt appeared to be anxious at times. Pt did not begin with negative thinking, or say \"I can't\".     Status After Intervention:  Improved    Participation Level: Interactive    Participation Quality: Appropriate      Speech:  normal      Thought Process/Content: Logical      Affective Functioning: Congruent      Mood: anxious      Level of consciousness:  Alert      Response to Learning: Able to verbalize current knowledge/experience      Endings: None Reported    Modes of Intervention: Education, Support, Socialization, and Clarifying      Discipline Responsible: Psychoeducational Specialist      Humza Nixon  Art Therapist, MA, ATR-P  Provisional Registered Art Therapist

## 2024-09-24 NOTE — GROUP NOTE
Group Therapy Note    Date: 9/24/2024    Group Start Time: 1500  Group End Time: 1550  Group Topic: Music Therapy    Toni Burger        Group Therapy Note    Attendees: 8/13    Group Focus: Music therapy group consisted of collaborative music making with a combination of instrumental improvisation and group singing. The group may promote improved mood, sense of self-efficacy, present-centered focus, social connection, self-expression, and use of music as a coping skill.       Notes:  Pt did not attend group, reporting he was experiencing anxiety.      Discipline Responsible: /Counselor      Signature:  LUCIAN Dickson, LPC  Board-Certified Music Therapist  Licensed Professional Counselor

## 2024-09-25 PROCEDURE — 6370000000 HC RX 637 (ALT 250 FOR IP): Performed by: PSYCHIATRY & NEUROLOGY

## 2024-09-25 PROCEDURE — 1240000000 HC EMOTIONAL WELLNESS R&B

## 2024-09-25 RX ADMIN — CLONAZEPAM 1 MG: 1 TABLET ORAL at 08:45

## 2024-09-25 RX ADMIN — DULOXETINE HYDROCHLORIDE 60 MG: 30 CAPSULE, DELAYED RELEASE ORAL at 08:45

## 2024-09-25 RX ADMIN — CLONAZEPAM 1 MG: 1 TABLET ORAL at 14:57

## 2024-09-25 RX ADMIN — CLONAZEPAM 1 MG: 1 TABLET ORAL at 20:35

## 2024-09-25 RX ADMIN — DULOXETINE HYDROCHLORIDE 60 MG: 30 CAPSULE, DELAYED RELEASE ORAL at 20:35

## 2024-09-25 RX ADMIN — LORAZEPAM 1 MG: 1 TABLET ORAL at 11:31

## 2024-09-25 RX ADMIN — AMLODIPINE BESYLATE 5 MG: 5 TABLET ORAL at 08:45

## 2024-09-25 RX ADMIN — MIRTAZAPINE 45 MG: 15 TABLET, FILM COATED ORAL at 20:35

## 2024-09-25 RX ADMIN — QUETIAPINE FUMARATE 200 MG: 100 TABLET ORAL at 20:35

## 2024-09-25 RX ADMIN — ATORVASTATIN CALCIUM 20 MG: 20 TABLET, FILM COATED ORAL at 20:35

## 2024-09-25 RX ADMIN — LAMOTRIGINE 150 MG: 100 TABLET ORAL at 08:45

## 2024-09-25 RX ADMIN — ASPIRIN 81 MG: 81 TABLET, COATED ORAL at 08:45

## 2024-09-25 RX ADMIN — Medication 2000 UNITS: at 08:45

## 2024-09-25 RX ADMIN — LAMOTRIGINE 150 MG: 100 TABLET ORAL at 20:35

## 2024-09-25 ASSESSMENT — PAIN SCALES - WONG BAKER: WONGBAKER_NUMERICALRESPONSE: NO HURT

## 2024-09-25 ASSESSMENT — PAIN SCALES - GENERAL: PAINLEVEL_OUTOF10: 0

## 2024-09-25 NOTE — GROUP NOTE
Art Therapy Group Progress Note    PATIENT SCHEDULED FOR GROUP AT: 1:03 PM     GROUP STOP TIME:  1:55 PM    ATTENDANCE: Moderate (6/12 participants)     TOPIC / FOCUS:  Feeling Flowers      GOALS:  categorize base emotions, identify emotion categories, identify differences between feeling and emotions, identify ways emotions are impacted, identify coping skills, encourage positive coping skills, increase focus, increase emotional communication skills, increase interpersonal awareness, encourage creativity       Notes:  Pt identified anxiety as an emotion. Pt added their somatic experience of anxiety to the petals of the flower. Pt engagement was limited/minimal. Pt contributed to group discussion when prompted.      Status After Intervention:  Improved, Pt didn't say \"I can't\" and attempted to participate     Participation Level: Interactive, improving but low energy     Participation Quality: Appropriate      Speech:  normal      Thought Process/Content: Logical, concrete       Affective Functioning: Congruent      Mood: anxious      Level of consciousness:  Alert and Attentive      Response to Learning: Able to verbalize current knowledge/experience      Endings: None Reported    Modes of Intervention: Education, Support, Socialization, Exploration, and Clarifying      Discipline Responsible: Psychoeducational Specialist      Humza Nixon  Art Therapist, MA, ATR-P  Provisional Registered Art Therapist

## 2024-09-25 NOTE — GROUP NOTE
Group Therapy Note    Date: 9/25/2024    Group Start Time: 1400  Group End Time: 1450  Group Topic: Music Therapy    Toni Burger        Group Therapy Note    Attendees: 6/9    Group Focus: Music therapy group explored themes of self-acceptance, empowerment, and resilience through the interventions of marimar analysis, songwriting, and group singing. Group members also shared 'I am' and 'I am not' statements which were utilized in the group's song. The group closed with a live rendition of the group's song and group singing of additional songs chosen by group members.         Notes:  Pt reported feeling \"anxious\" at the start of group. Pt shared the I am not statement, \"I am not a stranger to the dark,\" which was also a marimar in the song. Pt was logical although at times pt's statements were difficult to follow in terms of his train of thought.    Status After Intervention:  Unchanged    Participation Level: Interactive and Minimal    Participation Quality: Appropriate and Attentive      Speech:  normal      Thought Process/Content: Logical      Affective Functioning: Congruent      Mood: anxious      Level of consciousness:  Alert and Attentive      Response to Learning: Able to verbalize current knowledge/experience      Endings: None Reported    Modes of Intervention: Support, Socialization, Exploration, and Media      Discipline Responsible: /Counselor      Signature:  LUCIAN Dickson, LPC  Board-Certified Music Therapist  Licensed Professional Counselor

## 2024-09-26 PROCEDURE — 6370000000 HC RX 637 (ALT 250 FOR IP): Performed by: PSYCHIATRY & NEUROLOGY

## 2024-09-26 PROCEDURE — 1240000000 HC EMOTIONAL WELLNESS R&B

## 2024-09-26 RX ADMIN — DULOXETINE HYDROCHLORIDE 60 MG: 30 CAPSULE, DELAYED RELEASE ORAL at 20:27

## 2024-09-26 RX ADMIN — Medication 2000 UNITS: at 08:11

## 2024-09-26 RX ADMIN — CLONAZEPAM 1 MG: 1 TABLET ORAL at 21:00

## 2024-09-26 RX ADMIN — QUETIAPINE FUMARATE 200 MG: 100 TABLET ORAL at 20:27

## 2024-09-26 RX ADMIN — LAMOTRIGINE 150 MG: 100 TABLET ORAL at 08:11

## 2024-09-26 RX ADMIN — LAMOTRIGINE 150 MG: 100 TABLET ORAL at 21:00

## 2024-09-26 RX ADMIN — ATORVASTATIN CALCIUM 20 MG: 20 TABLET, FILM COATED ORAL at 20:27

## 2024-09-26 RX ADMIN — CLONAZEPAM 1 MG: 1 TABLET ORAL at 08:11

## 2024-09-26 RX ADMIN — AMLODIPINE BESYLATE 5 MG: 5 TABLET ORAL at 08:11

## 2024-09-26 RX ADMIN — ASPIRIN 81 MG: 81 TABLET, COATED ORAL at 08:11

## 2024-09-26 RX ADMIN — CLONAZEPAM 1 MG: 1 TABLET ORAL at 14:03

## 2024-09-26 RX ADMIN — MIRTAZAPINE 45 MG: 15 TABLET, FILM COATED ORAL at 20:27

## 2024-09-26 RX ADMIN — DULOXETINE HYDROCHLORIDE 60 MG: 30 CAPSULE, DELAYED RELEASE ORAL at 08:11

## 2024-09-26 ASSESSMENT — PAIN SCALES - GENERAL
PAINLEVEL_OUTOF10: 0
PAINLEVEL_OUTOF10: 0

## 2024-09-26 ASSESSMENT — PAIN SCALES - WONG BAKER
WONGBAKER_NUMERICALRESPONSE: NO HURT
WONGBAKER_NUMERICALRESPONSE: NO HURT

## 2024-09-26 NOTE — GROUP NOTE
Art Therapy Group Progress Note    PATIENT SCHEDULED FOR GROUP AT: 12:40 PM     GROUP STOP TIME:  1:25 PM    ATTENDANCE: Moderate (5/11 participants)     TOPIC / FOCUS:  Let it Go     GOALS:  encourage self-awareness, identify counterproductive behaviors or habits, promote positive coping skills, encourage creativity, encourage new decision-making skills, practice creativity, encourage focus        Notes:  Pt stated they wanted to let go of anxiety. Pt spoke of feeling anxiety in their stomach and chest. Pt was unable to identify triggers of anxiety. Pt reported they just all of a sudden feel anxious. However, Pt expressed they are feeling some anxiety about meeting with their wife tomorrow. Pt participation in art making was low energy and minimal.     Status After Intervention:  Unchanged    Participation Level: interactive by contributing to group discussion, minimal in art making    Participation Quality: Appropriate and Sharing      Speech:  normal      Thought Process/Content: Logical      Affective Functioning: Congruent      Mood: anxious      Level of consciousness:  Alert      Response to Learning: Able to verbalize current knowledge/experience      Endings: None Reported    Modes of Intervention: Education, Support, and Socialization      Discipline Responsible: Psychoeducational Specialist      Humza Nixon  Art Therapist, MA, ATR-P  Provisional Registered Art Therapist

## 2024-09-26 NOTE — GROUP NOTE
Group Therapy Note    Date: 9/26/2024    Group Start Time: 1500  Group End Time: 1550  Group Topic: Music Therapy    Toni Burger        Group Therapy Note    Attendees: 9/11    Group Focus: Music therapy group consisted of collaborative music making with a combination of instrument playing and group singing. The group may promote improved mood, sense of self-efficacy, present-centered focus, social connection, self-expression, and use of music as a coping skill.       Notes:  Pt reported that he had a \"lot of anxiety\" in advance of the group and stated he received medication. Pt stated he may need to step out of the group due to his anxiety. Pt did not step out of group and remained in the full group. Pt demonstrated active listening and sang along lightly at times to songs. Pt made a supportive comment to a peer related to their music making.    Status After Intervention:  Unchanged    Participation Level: Active Listener and Interactive    Participation Quality: Appropriate      Speech:  normal      Thought Process/Content: Logical      Affective Functioning: Congruent      Mood: anxious      Level of consciousness:  Alert and Attentive      Response to Learning: Able to verbalize current knowledge/experience      Endings: None Reported    Modes of Intervention: Support, Socialization, Exploration, and Media      Discipline Responsible: /Counselor      Signature:  LUCIAN Dickson, LPC  Board-Certified Music Therapist  Licensed Professional Counselor

## 2024-09-26 NOTE — GROUP NOTE
Group Therapy Note    Date: 2024    Group Start Time: 930  Group End Time:   Group Topic: Music Therapy      Toni Burger        Group Therapy Note    Attendees:     Group Focus: Music therapy group consisted of group members identifying and sharing personally meaningful songs in various categories related to their lives and in coping. Categories included identifying songs that remind them of someone important or an important event, songs that inspire them, songs that help them feel understood, songs to help calm down, and songs or lyrics to help accept oneself or their situation. Group members shared songs from their lists, and space was provided for verbal processing. The group may promote use of music as a coping skill.       Notes:  Pt attended group late and attended the majority of group. Pt originally stated he could not think of any music for the group topic. Pt then shared a song with the group that he stated reminded him of an important event. Pt discussed how the song he shared in group was played at his mother's  and was his mother's favorite song.    Status After Intervention:  Unchanged    Participation Level: Interactive    Participation Quality: Appropriate, Attentive, and Sharing      Speech:  normal      Thought Process/Content: Logical      Affective Functioning: Congruent      Mood: Calm      Level of consciousness:  Alert and Attentive      Response to Learning: Able to verbalize current knowledge/experience      Endings: None Reported    Modes of Intervention: Support, Socialization, Exploration, and Media      Discipline Responsible: /Counselor      Signature:  LUCIAN Dickson, LPC  Board-Certified Music Therapist  Licensed Professional Counselor

## 2024-09-27 PROCEDURE — 6370000000 HC RX 637 (ALT 250 FOR IP): Performed by: PSYCHIATRY & NEUROLOGY

## 2024-09-27 PROCEDURE — 1240000000 HC EMOTIONAL WELLNESS R&B

## 2024-09-27 PROCEDURE — 6370000000 HC RX 637 (ALT 250 FOR IP)

## 2024-09-27 RX ADMIN — CLONAZEPAM 1 MG: 1 TABLET ORAL at 20:29

## 2024-09-27 RX ADMIN — CLONAZEPAM 1 MG: 1 TABLET ORAL at 15:53

## 2024-09-27 RX ADMIN — AMLODIPINE BESYLATE 5 MG: 5 TABLET ORAL at 09:12

## 2024-09-27 RX ADMIN — ASPIRIN 81 MG: 81 TABLET, COATED ORAL at 09:10

## 2024-09-27 RX ADMIN — CLONAZEPAM 1 MG: 1 TABLET ORAL at 09:11

## 2024-09-27 RX ADMIN — ATORVASTATIN CALCIUM 20 MG: 20 TABLET, FILM COATED ORAL at 20:29

## 2024-09-27 RX ADMIN — LAMOTRIGINE 150 MG: 100 TABLET ORAL at 20:28

## 2024-09-27 RX ADMIN — COLCHICINE 0.6 MG: 0.6 CAPSULE ORAL at 09:12

## 2024-09-27 RX ADMIN — DULOXETINE HYDROCHLORIDE 60 MG: 30 CAPSULE, DELAYED RELEASE ORAL at 20:29

## 2024-09-27 RX ADMIN — Medication 2000 UNITS: at 09:11

## 2024-09-27 RX ADMIN — LAMOTRIGINE 150 MG: 100 TABLET ORAL at 09:11

## 2024-09-27 RX ADMIN — QUETIAPINE FUMARATE 200 MG: 100 TABLET ORAL at 20:28

## 2024-09-27 RX ADMIN — MIRTAZAPINE 45 MG: 15 TABLET, FILM COATED ORAL at 20:28

## 2024-09-27 RX ADMIN — DULOXETINE HYDROCHLORIDE 60 MG: 30 CAPSULE, DELAYED RELEASE ORAL at 09:10

## 2024-09-27 RX ADMIN — INDOMETHACIN 50 MG: 25 CAPSULE ORAL at 09:11

## 2024-09-27 ASSESSMENT — PAIN DESCRIPTION - DESCRIPTORS: DESCRIPTORS: ACHING

## 2024-09-27 ASSESSMENT — PAIN DESCRIPTION - ORIENTATION: ORIENTATION: RIGHT

## 2024-09-27 ASSESSMENT — PAIN DESCRIPTION - ONSET: ONSET: SUDDEN

## 2024-09-27 ASSESSMENT — PAIN SCALES - GENERAL: PAINLEVEL_OUTOF10: 5

## 2024-09-27 ASSESSMENT — PAIN DESCRIPTION - FREQUENCY: FREQUENCY: INTERMITTENT

## 2024-09-27 ASSESSMENT — PAIN DESCRIPTION - PAIN TYPE: TYPE: ACUTE PAIN

## 2024-09-27 ASSESSMENT — PAIN DESCRIPTION - LOCATION: LOCATION: KNEE

## 2024-09-27 NOTE — GROUP NOTE
Art Therapy Group Progress Note    PATIENT SCHEDULED FOR GROUP AT:  3:00 PM     GROUP STOP TIME:  3:30 PM    ATTENDANCE: High (9/9 participants)     TOPIC / FOCUS:  Kinesthetic Art Making     GOALS:  decreasing stress and anxiety, promote positive coping skills, increase focus, increase mind body connection and regulation, encourage creativity and joyful experience, practice bilateral stimulation, increase engagement, encourage socialization    Notes:  Pt participation in group was limited. Pt did make marks on the large paper. Pt interaction with materials was brief. Pt reported they were waiting to talk/visit with their wife.     Status After Intervention:  Unchanged    Participation Level: Minimal    Participation Quality: Appropriate      Speech:  normal      Thought Process/Content: Logical      Affective Functioning: Flat      Mood: anxious      Level of consciousness:  Alert      Response to Learning: Able to verbalize current knowledge/experience      Endings: None Reported    Modes of Intervention: Socialization and Activity      Discipline Responsible: Psychoeducational Specialist      Humza Nixon  Art Therapist, MA, ATR-P  Provisional Registered Art Therapist

## 2024-09-27 NOTE — GROUP NOTE
Group Therapy Note    Date: 9/27/2024    Group Start Time: 1300  Group End Time: 1345  Group Topic: Music Therapy    Toni Burger        Group Therapy Note    Attendees: 7/10    Group Focus: Music therapy group consisted of songwriting and/or poem writing utilizing the technique of song collage. Group members selected lyrics and words from existing songs to create their own songs, raps, or poetry. Group members worked individually and then were given the opportunity to share. The group also discussed use of music for relaxation, mindfulness techniques utilizing music, and music and breathwork techniques.       Notes:  Pt reported feeling \"anxious\" at the start of group. Pt reported he did not understand how to do the group. Therapist provided individual guidance and options to engage in group and pt declined. Pt sat in the group area and observed. Pt also engaged in conversation with a peer at his table. Pt left group early to meet with staff as part of treatment.    Status After Intervention:  Unchanged    Participation Level: None    Participation Quality: Appropriate and Resistant      Speech:  normal      Thought Process/Content: Logical      Affective Functioning: Congruent      Mood: depressed      Level of consciousness:  Alert      Response to Learning: Resistant      Endings: None Reported    Modes of Intervention: Support, Socialization, Exploration, and Media      Discipline Responsible: /Counselor      Signature:  LUCIAN Dickson, LPC  Board-Certified Music Therapist  Licensed Professional Counselor

## 2024-09-28 PROCEDURE — 6370000000 HC RX 637 (ALT 250 FOR IP): Performed by: PSYCHIATRY & NEUROLOGY

## 2024-09-28 PROCEDURE — 1240000000 HC EMOTIONAL WELLNESS R&B

## 2024-09-28 RX ADMIN — LAMOTRIGINE 150 MG: 100 TABLET ORAL at 07:43

## 2024-09-28 RX ADMIN — CLONAZEPAM 1 MG: 1 TABLET ORAL at 13:41

## 2024-09-28 RX ADMIN — CLONAZEPAM 1 MG: 1 TABLET ORAL at 07:43

## 2024-09-28 RX ADMIN — ASPIRIN 81 MG: 81 TABLET, COATED ORAL at 07:43

## 2024-09-28 RX ADMIN — QUETIAPINE FUMARATE 200 MG: 100 TABLET ORAL at 20:10

## 2024-09-28 RX ADMIN — DULOXETINE HYDROCHLORIDE 60 MG: 30 CAPSULE, DELAYED RELEASE ORAL at 07:43

## 2024-09-28 RX ADMIN — ATORVASTATIN CALCIUM 20 MG: 20 TABLET, FILM COATED ORAL at 20:10

## 2024-09-28 RX ADMIN — LAMOTRIGINE 150 MG: 100 TABLET ORAL at 20:10

## 2024-09-28 RX ADMIN — DULOXETINE HYDROCHLORIDE 60 MG: 30 CAPSULE, DELAYED RELEASE ORAL at 20:10

## 2024-09-28 RX ADMIN — AMLODIPINE BESYLATE 5 MG: 5 TABLET ORAL at 07:43

## 2024-09-28 RX ADMIN — CLONAZEPAM 1 MG: 1 TABLET ORAL at 20:10

## 2024-09-28 RX ADMIN — MIRTAZAPINE 45 MG: 15 TABLET, FILM COATED ORAL at 20:10

## 2024-09-28 RX ADMIN — Medication 2000 UNITS: at 07:43

## 2024-09-28 ASSESSMENT — PAIN SCALES - GENERAL: PAINLEVEL_OUTOF10: 0

## 2024-09-29 PROCEDURE — 6370000000 HC RX 637 (ALT 250 FOR IP): Performed by: PSYCHIATRY & NEUROLOGY

## 2024-09-29 PROCEDURE — 1240000000 HC EMOTIONAL WELLNESS R&B

## 2024-09-29 RX ADMIN — AMLODIPINE BESYLATE 5 MG: 5 TABLET ORAL at 08:29

## 2024-09-29 RX ADMIN — ATORVASTATIN CALCIUM 20 MG: 20 TABLET, FILM COATED ORAL at 20:12

## 2024-09-29 RX ADMIN — CLONAZEPAM 1 MG: 1 TABLET ORAL at 14:40

## 2024-09-29 RX ADMIN — Medication 2000 UNITS: at 08:29

## 2024-09-29 RX ADMIN — DULOXETINE HYDROCHLORIDE 60 MG: 30 CAPSULE, DELAYED RELEASE ORAL at 08:29

## 2024-09-29 RX ADMIN — DULOXETINE HYDROCHLORIDE 60 MG: 30 CAPSULE, DELAYED RELEASE ORAL at 20:11

## 2024-09-29 RX ADMIN — LAMOTRIGINE 150 MG: 100 TABLET ORAL at 20:11

## 2024-09-29 RX ADMIN — LAMOTRIGINE 150 MG: 100 TABLET ORAL at 08:29

## 2024-09-29 RX ADMIN — CLONAZEPAM 1 MG: 1 TABLET ORAL at 08:29

## 2024-09-29 RX ADMIN — ASPIRIN 81 MG: 81 TABLET, COATED ORAL at 08:29

## 2024-09-29 RX ADMIN — QUETIAPINE FUMARATE 200 MG: 100 TABLET ORAL at 20:11

## 2024-09-29 RX ADMIN — CLONAZEPAM 1 MG: 1 TABLET ORAL at 20:12

## 2024-09-29 RX ADMIN — MIRTAZAPINE 45 MG: 15 TABLET, FILM COATED ORAL at 20:11

## 2024-09-29 ASSESSMENT — PAIN SCALES - GENERAL: PAINLEVEL_OUTOF10: 0

## 2024-09-29 ASSESSMENT — PAIN SCALES - WONG BAKER: WONGBAKER_NUMERICALRESPONSE: NO HURT

## 2024-09-30 VITALS
HEART RATE: 89 BPM | BODY MASS INDEX: 26.41 KG/M2 | TEMPERATURE: 97.5 F | OXYGEN SATURATION: 100 % | RESPIRATION RATE: 16 BRPM | HEIGHT: 72 IN | WEIGHT: 195 LBS | DIASTOLIC BLOOD PRESSURE: 72 MMHG | SYSTOLIC BLOOD PRESSURE: 147 MMHG

## 2024-09-30 PROCEDURE — 6370000000 HC RX 637 (ALT 250 FOR IP): Performed by: PSYCHIATRY & NEUROLOGY

## 2024-09-30 PROCEDURE — 99239 HOSP IP/OBS DSCHRG MGMT >30: CPT | Performed by: PSYCHIATRY & NEUROLOGY

## 2024-09-30 RX ORDER — CHOLECALCIFEROL (VITAMIN D3) 50 MCG
2000 TABLET ORAL DAILY
Qty: 1 TABLET | Refills: 0
Start: 2024-10-01

## 2024-09-30 RX ORDER — CLONAZEPAM 1 MG/1
1 TABLET ORAL 3 TIMES DAILY PRN
Qty: 90 TABLET | Refills: 0 | Status: SHIPPED | OUTPATIENT
Start: 2024-09-30 | End: 2024-10-30

## 2024-09-30 RX ORDER — AMITRIPTYLINE HYDROCHLORIDE 100 MG/1
100 TABLET ORAL NIGHTLY
Qty: 30 TABLET | Refills: 0 | Status: SHIPPED | OUTPATIENT
Start: 2024-09-30

## 2024-09-30 RX ORDER — COLCHICINE 0.6 MG/1
0.6 CAPSULE ORAL 2 TIMES DAILY PRN
Qty: 1 CAPSULE | Refills: 0
Start: 2024-09-30

## 2024-09-30 RX ORDER — QUETIAPINE FUMARATE 50 MG/1
50 TABLET, FILM COATED ORAL NIGHTLY
Qty: 30 TABLET | Refills: 0 | Status: SHIPPED | OUTPATIENT
Start: 2024-09-30

## 2024-09-30 RX ORDER — LAMOTRIGINE 150 MG/1
150 TABLET ORAL 2 TIMES DAILY
Qty: 60 TABLET | Refills: 0 | Status: SHIPPED | OUTPATIENT
Start: 2024-09-30

## 2024-09-30 RX ORDER — AMLODIPINE BESYLATE 5 MG/1
5 TABLET ORAL DAILY
Qty: 30 TABLET | Refills: 3 | Status: SHIPPED | OUTPATIENT
Start: 2024-10-01

## 2024-09-30 RX ORDER — MIRTAZAPINE 45 MG/1
45 TABLET, FILM COATED ORAL NIGHTLY
Qty: 30 TABLET | Refills: 0 | Status: SHIPPED | OUTPATIENT
Start: 2024-09-30

## 2024-09-30 RX ORDER — DULOXETIN HYDROCHLORIDE 60 MG/1
60 CAPSULE, DELAYED RELEASE ORAL 2 TIMES DAILY
Qty: 60 CAPSULE | Refills: 0 | Status: SHIPPED | OUTPATIENT
Start: 2024-09-30

## 2024-09-30 RX ORDER — QUETIAPINE FUMARATE 200 MG/1
200 TABLET, FILM COATED ORAL NIGHTLY
Qty: 30 TABLET | Refills: 0 | Status: SHIPPED | OUTPATIENT
Start: 2024-09-30

## 2024-09-30 RX ADMIN — HYDROXYZINE HYDROCHLORIDE 50 MG: 50 TABLET, FILM COATED ORAL at 13:11

## 2024-09-30 RX ADMIN — ASPIRIN 81 MG: 81 TABLET, COATED ORAL at 08:15

## 2024-09-30 RX ADMIN — AMLODIPINE BESYLATE 5 MG: 5 TABLET ORAL at 08:15

## 2024-09-30 RX ADMIN — Medication 2000 UNITS: at 08:15

## 2024-09-30 RX ADMIN — CLONAZEPAM 1 MG: 1 TABLET ORAL at 08:15

## 2024-09-30 RX ADMIN — DULOXETINE HYDROCHLORIDE 60 MG: 30 CAPSULE, DELAYED RELEASE ORAL at 08:15

## 2024-09-30 RX ADMIN — LAMOTRIGINE 150 MG: 100 TABLET ORAL at 08:15

## 2024-09-30 ASSESSMENT — PAIN SCALES - WONG BAKER: WONGBAKER_NUMERICALRESPONSE: NO HURT

## 2024-09-30 ASSESSMENT — PAIN SCALES - GENERAL: PAINLEVEL_OUTOF10: 0

## 2024-09-30 NOTE — PROGRESS NOTES
[facility]  Day 3 Interdisciplinary Treatment Plan NOTE    Review Date & Time: 8/28/24 1050    Admission Type:   Admission Type: Voluntary    Reason for admission:   SI with a plan to shoot self    Patient Diagnosis:major depressive disorder, recurrent type    PATIENT STRENGTHS:  Patient Strengths    Patient Strengths and Limitations:Limitations: Unrealistic self-view, Lacks leisure interests, Apathetic / unmotivated  Addictive Behavior:   Medical Problems:  Past Medical History:   Diagnosis Date    Anxiety and depression     Cervical radiculopathy     Dr. Rahman    Diverticulitis     Fatty liver disease, nonalcoholic 10/14    ALT up    Gout     Hep C w/o coma, chronic (HCC)     Herniated disc, cervical     C5-C6    Hydrocele     Hydrocele     Hyperlipidemia     Insomnia     Kidney stone     Sleep apnea     uses CPAP    Stroke (HCC)     TIA in the past but cannot remember       Risk:  Fall Risk    Jon Scale Jon Scale Score: 22  BVC    Change in scoresn/a. Changes to plan of Care n/a    Status EXAM:   Mental Status and Behavioral Exam  Normal: No  Level of Assistance: Independent/Self  Facial Expression: Sad, Worried  Affect: Congruent  Level of Consciousness: Alert  Frequency of Checks: 1 staff: 1 patient  - continuous  Mood:Normal: No  Mood: Depressed, Anxious, Labile, Despair  Motor Activity:Normal: Yes  Motor Activity: Increased  Eye Contact: Fair  Observed Behavior: Withdrawn, Cooperative  Sexual Misconduct History: Current - no  Preception: [place] to person, [place] to time, [place] to place, [place] to situation  Attention:Normal: Yes  Attention: Distractible  Thought Processes: Circumstantial  Thought Content:Normal: No  Thought Content: Preoccupations  Depression Symptoms: Crying, Feelings of hopelessess  Anxiety Symptoms: Generalized  Angelica Symptoms: No problems reported or observed.  Hallucinations: None  Delusions: No  Memory:Normal: Yes  Memory: Poor remote  Insight and Judgment: 
  [facility]  Day 3 Interdisciplinary Treatment Plan NOTE    Review Date & Time: 9/25/24 1100    Admission Type:   Admission Type: Voluntary    Reason for admission:   SI with a plan to shoot himself    Patient Diagnosis: major depressive disorder without psychotic features    PATIENT STRENGTHS:  Patient Strengths    Patient Strengths and Limitations:Limitations: Unrealistic self-view, Lacks leisure interests, Apathetic / unmotivated  Addictive Behavior:   Medical Problems:  Past Medical History:   Diagnosis Date    Anxiety and depression     Cervical radiculopathy     Dr. Rahman    Diverticulitis     Fatty liver disease, nonalcoholic 10/14    ALT up    Gout     Hep C w/o coma, chronic (HCC)     Herniated disc, cervical     C5-C6    Hydrocele     Hydrocele     Hyperlipidemia     Insomnia     Kidney stone     Sleep apnea     uses CPAP    Stroke (HCC)     TIA in the past but cannot remember       Risk:  Fall Risk    Jon Scale Jon Scale Score: 23  BVC    Change in scoresn/a. Changes to plan of Care utilize gripper socks    Status EXAM:   Mental Status and Behavioral Exam  Normal: Yes  Level of Assistance: Independent/Self  Facial Expression: Flat  Affect: Congruent  Level of Consciousness: Alert  Frequency of Checks: 4 times per hour, close  Mood:Normal: Yes  Mood: Anxious  Motor Activity:Normal: Yes  Motor Activity: Decreased  Eye Contact: Good  Observed Behavior: Cooperative  Sexual Misconduct History: Current - no  Preception: [place] to person  Attention:Normal: Yes  Attention: Distractible  Thought Processes: Circumstantial  Thought Content:Normal: Yes  Thought Content: Preoccupations  Depression Symptoms: Isolative  Anxiety Symptoms: Generalized  Angelica Symptoms: No problems reported or observed.  Hallucinations: None  Delusions: No  Delusions: Controlled  Memory:Normal: Yes  Memory: Poor remote  Insight and Judgment: No  Insight and Judgment: Poor judgment    Daily Assessment Last Entry:    
 07:17- Pt is awake and alert at this time. He appear not to be in any distress.   
 conducted an Spirituality Group for Hernandez Franz, who is a 68 y.o.,male.   Patient’s Primary Language is: English.   According to the patient’s EMR Confucianist Affiliation is: Zoroastrian.     The reason the Patient came to the hospital is:   Patient Active Problem List    Diagnosis Date Noted    Major depressive disorder without psychotic features 08/03/2024    Diverticulosis of large intestine without hemorrhage 02/22/2016    Erectile dysfunction 02/22/2016    Fatty liver disease, nonalcoholic     Prediabetes     Gout     Cervical radiculopathy     Herniated disc, cervical     Elevated fasting glucose     Hyperlipidemia     Hydrocele, left 08/06/2013    Diverticulitis     Hep C w/o coma, chronic (HCC)     Anxiety and depression          conducted Spirituality Group for Hernandez who was one of 7 participants.    The  provided the following Interventions:  Continued the relationship of care and support.   Listened empathically.  Offered prayer and assurance of continued prayer on patients behalf.   Chart reviewed.    The following outcomes were achieved:  Patient expressed gratitude for 's visit.    Plan:  Chaplains will continue to follow and will provide pastoral care on an as needed/requested basis.   recommends bedside caregivers page  on duty if patient shows signs of acute spiritual or emotional distress.     Spiritual Health History and Assessment/Progress Note  [facility]    Behavioral Health,  ,  , Follow up     Name: Hernandez Franz MRN: [id number]    Age: 68 y.o.     Sex: male   Language: English   Pentecostalism: Zoroastrian   Major depressive disorder without psychotic features     Date: 9/29/2024            Total Time Calculated: 15 min              Spiritual Assessment continued in [facility] 1 ADULT 2        Referral/Consult From: Multi-disciplinary team   Encounter Overview/Reason: Behavioral Health  Service Provided For: Patient    [place], Belief, 
 conducted an Spirituality Group for Hernandez Franz, who is a 68 y.o.,male.   Patient’s Primary Language is: English.   According to the patient’s EMR Islam Affiliation is: Alevism.     The reason the Patient came to the hospital is:   Patient Active Problem List    Diagnosis Date Noted    Major depressive disorder without psychotic features 08/03/2024    Diverticulosis of large intestine without hemorrhage 02/22/2016    Erectile dysfunction 02/22/2016    Fatty liver disease, nonalcoholic     Prediabetes     Gout     Cervical radiculopathy     Herniated disc, cervical     Elevated fasting glucose     Hyperlipidemia     Hydrocele, left 08/06/2013    Diverticulitis     Hep C w/o coma, chronic (HCC)     Anxiety and depression          conducted Spirituality Group for Hernandez who was one of 6 participants.    The  provided the following Interventions:  Continued the relationship of care and support.   Listened empathically.  Offered prayer and assurance of continued prayer on patients behalf.   Chart reviewed.    The following outcomes were achieved:  Patient expressed gratitude for 's visit.    Assessment:  There are no further spiritual or Spiritism issues which require Spiritual Care Services interventions at this time.     Plan:  Chaplains will continue to follow and will provide pastoral care on an as needed/requested basis.   recommends bedside caregivers page  on duty if patient shows signs of acute spiritual or emotional distress.     Spiritual Health Assessment/Progress Note  [facility]    Behavioral Health,  ,  , [facility]    Name: Hernandez Franz MRN: [id number]    Age: 68 y.o.     Sex: male   Language: English   Gnosticist: Alevism   Major depressive disorder without psychotic features     Date: 9/15/2024            Total Time Calculated: 10 min              Spiritual Assessment continued in [facility] 1 ADULT 2        Referral/Consult 
07:28- his vital signs were taken and documented he performed his hygiene he is eating his breakfast.   07:33-pt is eating  07:45- pt still eating  07:49-the above pt is talking to nurse.   08:01 pt in day area sitting calm  08:11 pt returned to his room and is quietly laying down awake and does not appear to be in any diatresa. He consumed 100% of his breakfast at this time he appeared to have tolerated it well.   08:16- pt awake in his bed quiet  08:32-pt is resting and does not appear in any distress  08:41 pt is compliant with medications.  08:46 pt is resting  09:01- the above pt resting and does not appear to be in any signs of distress  09:16-Pt awake in bed quiet and is not under no distress  09:30- pt sitting out in the common area waiting for group. (Music)  09:54 pt in group (music)  10:00 pt is awake and alert while in group .  10:15- pt is in group engaging   10:31- pt still in music group engaging with staff  10:37 pt left art group and is currently in treatment team at this time.
10:46 - cont of previous note;  10:46 the above oat still continue to be in treatment team .   10:57- the above pt left treatment team and he is currently working on his safety plan while out in the common area.   11:00- pt still working on his safety plan while out in the common area.   Hand off report was given to the in coming staff at this time 
11:45 hand of report was given the above pt is sitting eating his lunch at this time . He is still quiet n alert .   11:55- the above pt was out in the common area quiet he then verbalized “I want to take my jacket off” he processed to his room reminded to get his glasses by staff.  He oil his jacket off and is currently awak laying in his bed .   12:00- pt is awake in his bed appears not to be in my distress.  12:15 the about it sitting out in the common area  12:31pt sitting  in the day area  12;45 pt awake sitting in day area  13:00 pt awake and calm   13:15-pt in art group  13:33 the above  pt was in his room toileting he then proceeded to rest while on kevin room he then verbalized “I want to walk” He is currently walking and now standing in the day area listening to group . He did not experience any falls  13:40 he was able to engage with his  for a brief second while still in group listening .  13:46- the above pt still continue to be listening to art therapy group at this time   14:02-the above pt still quiet while listening to group as group comes to an end.   14:16 pt still quiet   14:46- the above pt is quiet in common area   14:51- The above pt attempted to use the phone however he  is still looking for his number.   14:53 he was able to find the number and call at this time he is engaging on the phone at this time.   14:58 he is getting medication at this time while on the phone he was able to tolerate it well. He resumed his phone call.  15:00 pt still on the phone at this time.  15:03 pt is still on the phone . Hand off was given at this time . 
1230-pt awake and alert  1246-pt awake is sitting in chair  12:59 pt refused music group  13;00-pt sitting at table laughing at self   13:05- the above pt still sitting out in the common area. Hand off was given to the returning staff. He is alert and able to respond. He has not appeared with any distress.
1540 Patient continues to pace floor agitation increasing. MD notified. MD ordered 1X dose Geodon 20 mg IM. Medication administered. Medication is affective. Patient calming down. Remains on a continuous 1:1 observation level.   
AVRGHESE Note: The above it is off the unit for treatment team being accompanied by nurse to his treatment team meeting. 
Approximately 0020, patient awaken with complaints of pain in his left great toe and right knee. Notified nurse on unit of this. We’ll continue to monitor patient for safety and behavioral changes.
Assumed 1:1 care of patient approximately 1300 sitting at table in dayroom. Pt endorses SI and HI both without a plan. Will continue to monitor for safety and behavior.
Assumed 1:1 care of patient approximately 1915. Patient lying in bed with eyes closed, respirations even and unlabored. No signs of distress noted or seen. Will continue to monitor patient for safety and behavioral changes.
Assumed care of patient approximately 0735 from [facility]. Patient alert & oriented x3. Patient denies SI/HI. Patient verbalized he does not have any feelings. Will continue to monitor patient for safety and behavioral changes.
At 1355, Patient observed pacing dayroom with fists becoming more and more agitated. Patient attempting to close his room door on his 1:1 staff. Patient stating he is feeling homicidal. Patient given PRN ativan 1 mg PO at 1359. Patient verbally de-escalated by this writer. Patient currently sitting in chair in room with 1:1 at this time.   
B/L hearing aides, case and charging cord with adapter released to wife.  
[facility]  Week Interdisciplinary Treatment Plan Note     Review Date & Time: 09/18/24 1038    Admission Type:   Admission Type: Voluntary    Reason for admission:       Patient Diagnosis: MDD      PATIENT STRENGTHS:  Patient Strengths:   Patient Strengths and Limitations:Limitations: Unrealistic self-view, Lacks leisure interests, Apathetic / unmotivated  Addictive Behavior:   Medical Problems:   Past Medical History:   Diagnosis Date    Anxiety and depression     Cervical radiculopathy     Dr. Rahman    Diverticulitis     Fatty liver disease, nonalcoholic 10/14    ALT up    Gout     Hep C w/o coma, chronic (HCC)     Herniated disc, cervical     C5-C6    Hydrocele     Hydrocele     Hyperlipidemia     Insomnia     Kidney stone     Sleep apnea     uses CPAP    Stroke (HCC)     TIA in the past but cannot remember       Risk:  Fall Risk   Jon Scale Jon Scale Score: 23  BVC    Change in scores no. Changes to plan of Care no    Status EXAM:   Mental Status and Behavioral Exam  Normal: No  Level of Assistance: Independent/Self  Facial Expression: Flat  Affect: Blunt  Level of Consciousness: Alert  Frequency of Checks: 1 staff: 1 patient  - continuous  Mood:Normal: No  Mood: Depressed  Motor Activity:Normal: Yes  Motor Activity: Decreased  Eye Contact: Fair  Observed Behavior: Cooperative  Sexual Misconduct History: Current - no  Preception: [place] to person, [place] to time, [place] to place, [place] to situation  Attention:Normal: Yes  Attention: Distractible  Thought Processes: Circumstantial  Thought Content:Normal: No  Thought Content: Preoccupations  Depression Symptoms: Feelings of helplessness, Feelings of hopelessess  Anxiety Symptoms: Generalized  Angelica Symptoms: No problems reported or observed.  Hallucinations: None  Delusions: Yes  Delusions: Controlled  Memory:Normal: Yes  Memory: Poor remote  Insight and Judgment: No  Insight and Judgment: Poor judgment, Poor insight    Daily Assessment 
Behavioral [facility] Progress Note    Admit Date: 8/3/2024      Vitals : Patient Vitals for the past 8 hrs:   BP Temp Temp src Pulse Resp   08/24/24 0857 134/77 98.4 °F (36.9 °C) Oral 89 17     Labs:  No results found for this or any previous visit (from the past 24 hour(s)).  Meds:   Current Facility-Administered Medications   Medication Dose Route Frequency    QUEtiapine (SEROQUEL) tablet 100 mg  100 mg Oral BID    amLODIPine (NORVASC) tablet 2.5 mg  2.5 mg Oral Daily    indomethacin (INDOCIN) capsule 50 mg  50 mg Oral TID PRN    colchicine (MITIGARE) capsule 1.2 mg  1.2 mg Oral Once    clonazePAM (KLONOPIN) tablet 1 mg  1 mg Oral TID    LORazepam (ATIVAN) tablet 1 mg  1 mg Oral Q6H PRN    hydrOXYzine HCl (ATARAX) tablet 50 mg  50 mg Oral Q6H PRN    amitriptyline (ELAVIL) tablet 100 mg  100 mg Oral Nightly    acetaminophen (TYLENOL) tablet 650 mg  650 mg Oral Q4H PRN    aluminum & magnesium hydroxide-simethicone (MAALOX) 200-200-20 MG/5ML suspension 30 mL  30 mL Oral Q6H PRN    traZODone (DESYREL) tablet 50 mg  50 mg Oral Nightly PRN    atorvastatin (LIPITOR) tablet 20 mg  20 mg Oral Nightly    aspirin EC tablet 81 mg  81 mg Oral Daily    Vitamin D (CHOLECALCIFEROL) tablet 2,000 Units  2,000 Units Oral Daily    DULoxetine (CYMBALTA) extended release capsule 60 mg  60 mg Oral BID    lamoTRIgine (LAMICTAL) tablet 150 mg  150 mg Oral BID      Hospital Problems: Principal Problem:    Major depressive disorder without psychotic features  Resolved Problems:    * No resolved hospital problems. *      Subjective:   Medication side effects: none      Mental Status Exam  Sensorium: alert  Orientation: oriented to time, place, person and situation  Relations: passive  Eye Contact: poor  Appearance: is unkempt and is tense  Thought Process: fast rate of thoughts, poor abstract reasoning/computation, and logical    Thought Content: obsessions    Suicidal: ideas   Homicidal: denies, had episode past week of thought to harm wife 
Behavioral Health Progress Note    Admit Date: 8/3/2024      Vitals : Patient Vitals for the past 8 hrs:   BP Temp Temp src Pulse Resp   08/25/24 0800 137/75 98.7 °F (37.1 °C) Oral (!) 101 18     Labs:  No results found for this or any previous visit (from the past 24 hour(s)).  Meds:   Current Facility-Administered Medications   Medication Dose Route Frequency    QUEtiapine (SEROQUEL) tablet 100 mg  100 mg Oral BID    amLODIPine (NORVASC) tablet 2.5 mg  2.5 mg Oral Daily    indomethacin (INDOCIN) capsule 50 mg  50 mg Oral TID PRN    colchicine (MITIGARE) capsule 1.2 mg  1.2 mg Oral Once    clonazePAM (KLONOPIN) tablet 1 mg  1 mg Oral TID    LORazepam (ATIVAN) tablet 1 mg  1 mg Oral Q6H PRN    hydrOXYzine HCl (ATARAX) tablet 50 mg  50 mg Oral Q6H PRN    amitriptyline (ELAVIL) tablet 100 mg  100 mg Oral Nightly    acetaminophen (TYLENOL) tablet 650 mg  650 mg Oral Q4H PRN    aluminum & magnesium hydroxide-simethicone (MAALOX) 200-200-20 MG/5ML suspension 30 mL  30 mL Oral Q6H PRN    traZODone (DESYREL) tablet 50 mg  50 mg Oral Nightly PRN    atorvastatin (LIPITOR) tablet 20 mg  20 mg Oral Nightly    aspirin EC tablet 81 mg  81 mg Oral Daily    Vitamin D (CHOLECALCIFEROL) tablet 2,000 Units  2,000 Units Oral Daily    DULoxetine (CYMBALTA) extended release capsule 60 mg  60 mg Oral BID    lamoTRIgine (LAMICTAL) tablet 150 mg  150 mg Oral BID      Hospital Problems: Principal Problem:    Major depressive disorder without psychotic features  Resolved Problems:    * No resolved hospital problems. *      Subjective:   Medication side effects: none      Mental Status Exam  Sensorium: alert  Orientation: only aware of time, place, and person  Relations: guarded  Eye Contact: intense  Appearance: shows poor hygiene  Thought Process: fast rate of thoughts, poor abstract reasoning/computation, and obsessive dwelling on negative material    Thought Content: delusions and obsessions    Suicidal: ideas   Homicidal: denies   Mood: 
Behavioral Health Progress Note    Admit Date: 8/3/2024      Vitals : Patient Vitals for the past 8 hrs:   BP Temp Temp src Pulse Resp SpO2   09/14/24 0757 (!) 148/89 97.6 °F (36.4 °C) Oral 80 18 98 %     Labs:  No results found for this or any previous visit (from the past 24 hour(s)).  Meds:   Current Facility-Administered Medications   Medication Dose Route Frequency    QUEtiapine (SEROQUEL) tablet 200 mg  200 mg Oral Nightly    mirtazapine (REMERON) tablet 45 mg  45 mg Oral Nightly    colchicine (MITIGARE) capsule 0.6 mg  0.6 mg Oral BID PRN    haloperidol lactate (HALDOL) injection 5 mg  5 mg IntraMUSCular Q8H PRN    LORazepam (ATIVAN) injection 2 mg  2 mg IntraMUSCular Q8H PRN    amLODIPine (NORVASC) tablet 2.5 mg  2.5 mg Oral Daily    indomethacin (INDOCIN) capsule 50 mg  50 mg Oral TID PRN    clonazePAM (KLONOPIN) tablet 1 mg  1 mg Oral TID    LORazepam (ATIVAN) tablet 1 mg  1 mg Oral Q6H PRN    hydrOXYzine HCl (ATARAX) tablet 50 mg  50 mg Oral Q6H PRN    acetaminophen (TYLENOL) tablet 650 mg  650 mg Oral Q4H PRN    aluminum & magnesium hydroxide-simethicone (MAALOX) 200-200-20 MG/5ML suspension 30 mL  30 mL Oral Q6H PRN    traZODone (DESYREL) tablet 50 mg  50 mg Oral Nightly PRN    atorvastatin (LIPITOR) tablet 20 mg  20 mg Oral Nightly    aspirin EC tablet 81 mg  81 mg Oral Daily    Vitamin D (CHOLECALCIFEROL) tablet 2,000 Units  2,000 Units Oral Daily    DULoxetine (CYMBALTA) extended release capsule 60 mg  60 mg Oral BID    lamoTRIgine (LAMICTAL) tablet 150 mg  150 mg Oral BID      Hospital Problems: Principal Problem:    Major depressive disorder without psychotic features  Resolved Problems:    * No resolved hospital problems. *      Subjective:   Medication side effects: sleepy    Mental Status Exam  Sensorium: alert  Orientation: only aware of time, place, and person  Relations: guarded and passive  Eye Contact: fair  Appearance: is tense  Thought Process: slow rate of thoughts, poor abstract 
Behavioral Health Progress Note    Admit Date: 8/3/2024      Vitals : Patient Vitals for the past 8 hrs:   BP Temp Temp src Pulse Resp SpO2   09/15/24 0805 112/77 97.1 °F (36.2 °C) Oral 96 18 98 %     Labs:  No results found for this or any previous visit (from the past 24 hour(s)).  Meds:   Current Facility-Administered Medications   Medication Dose Route Frequency    QUEtiapine (SEROQUEL) tablet 200 mg  200 mg Oral Nightly    mirtazapine (REMERON) tablet 45 mg  45 mg Oral Nightly    colchicine (MITIGARE) capsule 0.6 mg  0.6 mg Oral BID PRN    haloperidol lactate (HALDOL) injection 5 mg  5 mg IntraMUSCular Q8H PRN    LORazepam (ATIVAN) injection 2 mg  2 mg IntraMUSCular Q8H PRN    amLODIPine (NORVASC) tablet 2.5 mg  2.5 mg Oral Daily    indomethacin (INDOCIN) capsule 50 mg  50 mg Oral TID PRN    clonazePAM (KLONOPIN) tablet 1 mg  1 mg Oral TID    LORazepam (ATIVAN) tablet 1 mg  1 mg Oral Q6H PRN    hydrOXYzine HCl (ATARAX) tablet 50 mg  50 mg Oral Q6H PRN    acetaminophen (TYLENOL) tablet 650 mg  650 mg Oral Q4H PRN    aluminum & magnesium hydroxide-simethicone (MAALOX) 200-200-20 MG/5ML suspension 30 mL  30 mL Oral Q6H PRN    traZODone (DESYREL) tablet 50 mg  50 mg Oral Nightly PRN    atorvastatin (LIPITOR) tablet 20 mg  20 mg Oral Nightly    aspirin EC tablet 81 mg  81 mg Oral Daily    Vitamin D (CHOLECALCIFEROL) tablet 2,000 Units  2,000 Units Oral Daily    DULoxetine (CYMBALTA) extended release capsule 60 mg  60 mg Oral BID    lamoTRIgine (LAMICTAL) tablet 150 mg  150 mg Oral BID      Hospital Problems: Principal Problem:    Major depressive disorder without psychotic features  Resolved Problems:    * No resolved hospital problems. *      Subjective:   Medication side effects: sleepy    Mental Status Exam  Sensorium: alert  Orientation: only aware of time, place, and person  Relations: guarded and passive  Eye Contact: fair  Appearance: is tense  Thought Process: slow rate of thoughts, poor abstract 
Behavioral Health Progress Note    Admit Date: 8/3/2024  Hospital day 12    Vitals : No data found.  Labs:  No results found for this or any previous visit (from the past 24 hour(s)).  Meds:   Current Facility-Administered Medications   Medication Dose Route Frequency    QUEtiapine (SEROQUEL) tablet 50 mg  50 mg Oral Nightly    clonazePAM (KLONOPIN) tablet 1 mg  1 mg Oral TID    LORazepam (ATIVAN) tablet 1 mg  1 mg Oral Q6H PRN    hydrOXYzine HCl (ATARAX) tablet 50 mg  50 mg Oral Q6H PRN    amitriptyline (ELAVIL) tablet 100 mg  100 mg Oral Nightly    colchicine (MITIGARE) capsule 0.6 mg  0.6 mg Oral BID PRN    acetaminophen (TYLENOL) tablet 650 mg  650 mg Oral Q4H PRN    aluminum & magnesium hydroxide-simethicone (MAALOX) 200-200-20 MG/5ML suspension 30 mL  30 mL Oral Q6H PRN    traZODone (DESYREL) tablet 50 mg  50 mg Oral Nightly PRN    atorvastatin (LIPITOR) tablet 20 mg  20 mg Oral Nightly    aspirin EC tablet 81 mg  81 mg Oral Daily    Vitamin D (CHOLECALCIFEROL) tablet 2,000 Units  2,000 Units Oral Daily    DULoxetine (CYMBALTA) extended release capsule 60 mg  60 mg Oral BID    lamoTRIgine (LAMICTAL) tablet 150 mg  150 mg Oral BID      Hospital Problems: Principal Problem:    Major depressive disorder without psychotic features  Resolved Problems:    * No resolved hospital problems. *      Subjective:   Medication side effects: none      Mental Status Exam  Sensorium: alert  Orientation: only aware of time, place, and person  Relations: guarded and passive  Eye Contact: poor  Appearance: shows no evidence of impairment  Thought Process: normal rate of thoughts, fair abstract reasoning/computation, and logical, avoidant of issues    Thought Content: obsessions    Suicidal: recent ideas, denies now   Homicidal: denies   Mood: is anxious, is sad, and is irritable   Affect:  congruent  Memory: is impaired and is recent     Concentration: distractable  Abstraction: concrete  Insight: The patient shows little insight 
Behavioral Health Progress Note    Admit Date: 8/3/2024  Hospital day 13    Vitals : Patient Vitals for the past 8 hrs:   BP Temp Temp src Pulse Resp   08/16/24 0840 119/74 98 °F (36.7 °C) Oral 95 18     Labs:  No results found for this or any previous visit (from the past 24 hour(s)).  Meds:   Current Facility-Administered Medications   Medication Dose Route Frequency    QUEtiapine (SEROQUEL) tablet 100 mg  100 mg Oral Nightly    clonazePAM (KLONOPIN) tablet 1 mg  1 mg Oral TID    LORazepam (ATIVAN) tablet 1 mg  1 mg Oral Q6H PRN    hydrOXYzine HCl (ATARAX) tablet 50 mg  50 mg Oral Q6H PRN    amitriptyline (ELAVIL) tablet 100 mg  100 mg Oral Nightly    colchicine (MITIGARE) capsule 0.6 mg  0.6 mg Oral BID PRN    acetaminophen (TYLENOL) tablet 650 mg  650 mg Oral Q4H PRN    aluminum & magnesium hydroxide-simethicone (MAALOX) 200-200-20 MG/5ML suspension 30 mL  30 mL Oral Q6H PRN    traZODone (DESYREL) tablet 50 mg  50 mg Oral Nightly PRN    atorvastatin (LIPITOR) tablet 20 mg  20 mg Oral Nightly    aspirin EC tablet 81 mg  81 mg Oral Daily    Vitamin D (CHOLECALCIFEROL) tablet 2,000 Units  2,000 Units Oral Daily    DULoxetine (CYMBALTA) extended release capsule 60 mg  60 mg Oral BID    lamoTRIgine (LAMICTAL) tablet 150 mg  150 mg Oral BID      Hospital Problems: Principal Problem:    Major depressive disorder without psychotic features  Resolved Problems:    * No resolved hospital problems. *      Subjective:   Medication side effects: none      Mental Status Exam  Sensorium: alert  Orientation: only aware of time, place, and person  Relations: cooperative and guarded  Eye Contact: appropriate  Appearance: shows no evidence of impairment  Thought Process: slow rate of thoughts, poor abstract reasoning/computation, and logical    Thought Content: obsessions    Suicidal: ideas   Homicidal: denies   Mood: is depressed, is anxious, and is withdrawn   Affect:  constricted  Memory: shows no evidence of impairment 
Behavioral Health Progress Note    Admit Date: 8/3/2024  Hospital day 2    Vitals : No data found.  Labs:  No results found for this or any previous visit (from the past 24 hour(s)).  Meds:   Current Facility-Administered Medications   Medication Dose Route Frequency    amitriptyline (ELAVIL) tablet 100 mg  100 mg Oral Nightly    colchicine (MITIGARE) capsule 0.6 mg  0.6 mg Oral BID PRN    LORazepam (ATIVAN) tablet 1 mg  1 mg Oral TID    LORazepam (ATIVAN) tablet 2 mg  2 mg Oral Q6H PRN    acetaminophen (TYLENOL) tablet 650 mg  650 mg Oral Q4H PRN    aluminum & magnesium hydroxide-simethicone (MAALOX) 200-200-20 MG/5ML suspension 30 mL  30 mL Oral Q6H PRN    hydrOXYzine HCl (ATARAX) tablet 25 mg  25 mg Oral Q6H PRN    traZODone (DESYREL) tablet 50 mg  50 mg Oral Nightly PRN    atorvastatin (LIPITOR) tablet 20 mg  20 mg Oral Nightly    aspirin EC tablet 81 mg  81 mg Oral Daily    Vitamin D (CHOLECALCIFEROL) tablet 2,000 Units  2,000 Units Oral Daily    DULoxetine (CYMBALTA) extended release capsule 60 mg  60 mg Oral BID    lamoTRIgine (LAMICTAL) tablet 150 mg  150 mg Oral BID      Hospital Problems: Principal Problem:    Major depressive disorder without psychotic features  Resolved Problems:    * No resolved hospital problems. *      Subjective:   Medication side effects: none      Mental Status Exam  Sensorium: alert  Orientation: oriented to time, place, person and situation  Relations: guarded and passive  Eye Contact: poor  Appearance: shows no evidence of impairment  Thought Process: slow rate of thoughts, fair abstract reasoning/computation, and logical    Thought Content: obsessions    Suicidal: ideas   Homicidal: denies   Mood: is depressed and is anxious   Affect: stable  Memory: shows no evidence of impairment     Concentration: distractable  Abstraction: concrete  Insight: The patient shows little insight    OR Fair  Judgement: is psychologically impaired OR  Fair    Assessment/Plan:   not 
Behavioral Health Progress Note    Admit Date: 8/3/2024  Hospital day 31    Vitals : Patient Vitals for the past 8 hrs:   BP Temp Temp src Pulse Resp SpO2   09/03/24 2015 (!) 142/78 98.2 °F (36.8 °C) Oral 76 16 98 %     Labs:  No results found for this or any previous visit (from the past 24 hour(s)).  Meds:   Current Facility-Administered Medications   Medication Dose Route Frequency    colchicine (MITIGARE) capsule 0.6 mg  0.6 mg Oral BID PRN    QUEtiapine (SEROQUEL) tablet 200 mg  200 mg Oral BID    haloperidol lactate (HALDOL) injection 5 mg  5 mg IntraMUSCular Q8H PRN    LORazepam (ATIVAN) injection 2 mg  2 mg IntraMUSCular Q8H PRN    amLODIPine (NORVASC) tablet 2.5 mg  2.5 mg Oral Daily    indomethacin (INDOCIN) capsule 50 mg  50 mg Oral TID PRN    clonazePAM (KLONOPIN) tablet 1 mg  1 mg Oral TID    LORazepam (ATIVAN) tablet 1 mg  1 mg Oral Q6H PRN    hydrOXYzine HCl (ATARAX) tablet 50 mg  50 mg Oral Q6H PRN    amitriptyline (ELAVIL) tablet 100 mg  100 mg Oral Nightly    acetaminophen (TYLENOL) tablet 650 mg  650 mg Oral Q4H PRN    aluminum & magnesium hydroxide-simethicone (MAALOX) 200-200-20 MG/5ML suspension 30 mL  30 mL Oral Q6H PRN    traZODone (DESYREL) tablet 50 mg  50 mg Oral Nightly PRN    atorvastatin (LIPITOR) tablet 20 mg  20 mg Oral Nightly    aspirin EC tablet 81 mg  81 mg Oral Daily    Vitamin D (CHOLECALCIFEROL) tablet 2,000 Units  2,000 Units Oral Daily    DULoxetine (CYMBALTA) extended release capsule 60 mg  60 mg Oral BID    lamoTRIgine (LAMICTAL) tablet 150 mg  150 mg Oral BID      Hospital Problems: Principal Problem:    Major depressive disorder without psychotic features  Resolved Problems:    * No resolved hospital problems. *      Subjective:   Medication side effects:  light headed      Mental Status Exam  Sensorium: alert  Orientation: only aware of time, place, and person  Relations: guarded and passive  Eye Contact: poor  Appearance: is tense  Thought Process: slow rate of 
Behavioral Health Progress Note    Admit Date: 8/3/2024  Hospital day 33    Vitals : Patient Vitals for the past 8 hrs:   BP Pulse Resp   09/05/24 1730 112/70 91 18     Labs:  No results found for this or any previous visit (from the past 24 hour(s)).  Meds:   Current Facility-Administered Medications   Medication Dose Route Frequency    QUEtiapine (SEROQUEL) tablet 400 mg  400 mg Oral Nightly    colchicine (MITIGARE) capsule 0.6 mg  0.6 mg Oral BID PRN    haloperidol lactate (HALDOL) injection 5 mg  5 mg IntraMUSCular Q8H PRN    LORazepam (ATIVAN) injection 2 mg  2 mg IntraMUSCular Q8H PRN    amLODIPine (NORVASC) tablet 2.5 mg  2.5 mg Oral Daily    indomethacin (INDOCIN) capsule 50 mg  50 mg Oral TID PRN    clonazePAM (KLONOPIN) tablet 1 mg  1 mg Oral TID    LORazepam (ATIVAN) tablet 1 mg  1 mg Oral Q6H PRN    hydrOXYzine HCl (ATARAX) tablet 50 mg  50 mg Oral Q6H PRN    amitriptyline (ELAVIL) tablet 100 mg  100 mg Oral Nightly    acetaminophen (TYLENOL) tablet 650 mg  650 mg Oral Q4H PRN    aluminum & magnesium hydroxide-simethicone (MAALOX) 200-200-20 MG/5ML suspension 30 mL  30 mL Oral Q6H PRN    traZODone (DESYREL) tablet 50 mg  50 mg Oral Nightly PRN    atorvastatin (LIPITOR) tablet 20 mg  20 mg Oral Nightly    aspirin EC tablet 81 mg  81 mg Oral Daily    Vitamin D (CHOLECALCIFEROL) tablet 2,000 Units  2,000 Units Oral Daily    DULoxetine (CYMBALTA) extended release capsule 60 mg  60 mg Oral BID    lamoTRIgine (LAMICTAL) tablet 150 mg  150 mg Oral BID      Hospital Problems: Principal Problem:    Major depressive disorder without psychotic features  Resolved Problems:    * No resolved hospital problems. *      Subjective:   Medication side effects: sleepy    Mental Status Exam  Sensorium: alert  Orientation: only aware of time, place, and person  Relations: guarded and passive  Eye Contact: poor  Appearance: is tense  Thought Process: slow rate of thoughts, poor abstract reasoning/computation, and logical  
Behavioral Health Progress Note    Admit Date: 8/3/2024  Hospital day 34    Vitals : Patient Vitals for the past 8 hrs:   BP Temp Temp src Pulse Resp SpO2   09/06/24 0806 122/72 97.9 °F (36.6 °C) Oral 81 16 97 %     Labs:  No results found for this or any previous visit (from the past 24 hour(s)).  Meds:   Current Facility-Administered Medications   Medication Dose Route Frequency    mirtazapine (REMERON) tablet 30 mg  30 mg Oral Nightly    QUEtiapine (SEROQUEL) tablet 400 mg  400 mg Oral Nightly    colchicine (MITIGARE) capsule 0.6 mg  0.6 mg Oral BID PRN    haloperidol lactate (HALDOL) injection 5 mg  5 mg IntraMUSCular Q8H PRN    LORazepam (ATIVAN) injection 2 mg  2 mg IntraMUSCular Q8H PRN    amLODIPine (NORVASC) tablet 2.5 mg  2.5 mg Oral Daily    indomethacin (INDOCIN) capsule 50 mg  50 mg Oral TID PRN    clonazePAM (KLONOPIN) tablet 1 mg  1 mg Oral TID    LORazepam (ATIVAN) tablet 1 mg  1 mg Oral Q6H PRN    hydrOXYzine HCl (ATARAX) tablet 50 mg  50 mg Oral Q6H PRN    acetaminophen (TYLENOL) tablet 650 mg  650 mg Oral Q4H PRN    aluminum & magnesium hydroxide-simethicone (MAALOX) 200-200-20 MG/5ML suspension 30 mL  30 mL Oral Q6H PRN    traZODone (DESYREL) tablet 50 mg  50 mg Oral Nightly PRN    atorvastatin (LIPITOR) tablet 20 mg  20 mg Oral Nightly    aspirin EC tablet 81 mg  81 mg Oral Daily    Vitamin D (CHOLECALCIFEROL) tablet 2,000 Units  2,000 Units Oral Daily    DULoxetine (CYMBALTA) extended release capsule 60 mg  60 mg Oral BID    lamoTRIgine (LAMICTAL) tablet 150 mg  150 mg Oral BID      Hospital Problems: Principal Problem:    Major depressive disorder without psychotic features  Resolved Problems:    * No resolved hospital problems. *      Subjective:   Medication side effects: sleepy    Mental Status Exam  Sensorium: alert  Orientation: only aware of time, place, and person  Relations: guarded and passive  Eye Contact: poor  Appearance: is tense  Thought Process: slow rate of thoughts, poor 
Behavioral Health Progress Note    Admit Date: 8/3/2024  Hospital day 37    Vitals : Patient Vitals for the past 8 hrs:   BP Temp Temp src Pulse Resp SpO2   09/09/24 0824 130/80 97.2 °F (36.2 °C) Oral 82 18 95 %   09/09/24 0758 130/80 -- -- -- -- --     Labs:  No results found for this or any previous visit (from the past 24 hour(s)).  Meds:   Current Facility-Administered Medications   Medication Dose Route Frequency    mirtazapine (REMERON) tablet 30 mg  30 mg Oral Nightly    QUEtiapine (SEROQUEL) tablet 400 mg  400 mg Oral Nightly    colchicine (MITIGARE) capsule 0.6 mg  0.6 mg Oral BID PRN    haloperidol lactate (HALDOL) injection 5 mg  5 mg IntraMUSCular Q8H PRN    LORazepam (ATIVAN) injection 2 mg  2 mg IntraMUSCular Q8H PRN    amLODIPine (NORVASC) tablet 2.5 mg  2.5 mg Oral Daily    indomethacin (INDOCIN) capsule 50 mg  50 mg Oral TID PRN    clonazePAM (KLONOPIN) tablet 1 mg  1 mg Oral TID    LORazepam (ATIVAN) tablet 1 mg  1 mg Oral Q6H PRN    hydrOXYzine HCl (ATARAX) tablet 50 mg  50 mg Oral Q6H PRN    acetaminophen (TYLENOL) tablet 650 mg  650 mg Oral Q4H PRN    aluminum & magnesium hydroxide-simethicone (MAALOX) 200-200-20 MG/5ML suspension 30 mL  30 mL Oral Q6H PRN    traZODone (DESYREL) tablet 50 mg  50 mg Oral Nightly PRN    atorvastatin (LIPITOR) tablet 20 mg  20 mg Oral Nightly    aspirin EC tablet 81 mg  81 mg Oral Daily    Vitamin D (CHOLECALCIFEROL) tablet 2,000 Units  2,000 Units Oral Daily    DULoxetine (CYMBALTA) extended release capsule 60 mg  60 mg Oral BID    lamoTRIgine (LAMICTAL) tablet 150 mg  150 mg Oral BID      Hospital Problems: Principal Problem:    Major depressive disorder without psychotic features  Resolved Problems:    * No resolved hospital problems. *      Subjective:   Medication side effects: sleepy    Mental Status Exam  Sensorium: alert  Orientation: only aware of time, place, and person  Relations: guarded and passive  Eye Contact: poor  Appearance: is tense  Thought 
Behavioral Health Progress Note    Admit Date: 8/3/2024  Hospital day 39    Vitals : Patient Vitals for the past 8 hrs:   BP Temp Temp src Pulse Resp SpO2   09/11/24 0800 (!) 147/89 97.4 °F (36.3 °C) Oral 79 16 100 %     Labs:  No results found for this or any previous visit (from the past 24 hour(s)).  Meds:   Current Facility-Administered Medications   Medication Dose Route Frequency    QUEtiapine (SEROQUEL) tablet 200 mg  200 mg Oral Nightly    mirtazapine (REMERON) tablet 45 mg  45 mg Oral Nightly    colchicine (MITIGARE) capsule 0.6 mg  0.6 mg Oral BID PRN    haloperidol lactate (HALDOL) injection 5 mg  5 mg IntraMUSCular Q8H PRN    LORazepam (ATIVAN) injection 2 mg  2 mg IntraMUSCular Q8H PRN    amLODIPine (NORVASC) tablet 2.5 mg  2.5 mg Oral Daily    indomethacin (INDOCIN) capsule 50 mg  50 mg Oral TID PRN    clonazePAM (KLONOPIN) tablet 1 mg  1 mg Oral TID    LORazepam (ATIVAN) tablet 1 mg  1 mg Oral Q6H PRN    hydrOXYzine HCl (ATARAX) tablet 50 mg  50 mg Oral Q6H PRN    acetaminophen (TYLENOL) tablet 650 mg  650 mg Oral Q4H PRN    aluminum & magnesium hydroxide-simethicone (MAALOX) 200-200-20 MG/5ML suspension 30 mL  30 mL Oral Q6H PRN    traZODone (DESYREL) tablet 50 mg  50 mg Oral Nightly PRN    atorvastatin (LIPITOR) tablet 20 mg  20 mg Oral Nightly    aspirin EC tablet 81 mg  81 mg Oral Daily    Vitamin D (CHOLECALCIFEROL) tablet 2,000 Units  2,000 Units Oral Daily    DULoxetine (CYMBALTA) extended release capsule 60 mg  60 mg Oral BID    lamoTRIgine (LAMICTAL) tablet 150 mg  150 mg Oral BID      Hospital Problems: Principal Problem:    Major depressive disorder without psychotic features  Resolved Problems:    * No resolved hospital problems. *      Subjective:   Medication side effects: sleepy    Mental Status Exam  Sensorium: alert  Orientation: only aware of time, place, and person  Relations: guarded and passive  Eye Contact: poor  Appearance: is tense  Thought Process: slow rate of thoughts, poor 
Behavioral Health Progress Note    Admit Date: 8/3/2024  Hospital day 4    Vitals : Patient Vitals for the past 8 hrs:   BP Temp Temp src Pulse Resp SpO2   08/07/24 1959 (!) 190/88 97 °F (36.1 °C) Oral 72 18 97 %     Labs:  No results found for this or any previous visit (from the past 24 hour(s)).  Meds:   Current Facility-Administered Medications   Medication Dose Route Frequency    LORazepam (ATIVAN) tablet 1 mg  1 mg Oral Q6H PRN    hydrOXYzine HCl (ATARAX) tablet 50 mg  50 mg Oral Q6H PRN    amitriptyline (ELAVIL) tablet 100 mg  100 mg Oral Nightly    colchicine (MITIGARE) capsule 0.6 mg  0.6 mg Oral BID PRN    LORazepam (ATIVAN) tablet 1 mg  1 mg Oral TID    acetaminophen (TYLENOL) tablet 650 mg  650 mg Oral Q4H PRN    aluminum & magnesium hydroxide-simethicone (MAALOX) 200-200-20 MG/5ML suspension 30 mL  30 mL Oral Q6H PRN    traZODone (DESYREL) tablet 50 mg  50 mg Oral Nightly PRN    atorvastatin (LIPITOR) tablet 20 mg  20 mg Oral Nightly    aspirin EC tablet 81 mg  81 mg Oral Daily    Vitamin D (CHOLECALCIFEROL) tablet 2,000 Units  2,000 Units Oral Daily    DULoxetine (CYMBALTA) extended release capsule 60 mg  60 mg Oral BID    lamoTRIgine (LAMICTAL) tablet 150 mg  150 mg Oral BID      Hospital Problems: Principal Problem:    Major depressive disorder without psychotic features  Resolved Problems:    * No resolved hospital problems. *      Subjective:   Medication side effects: none      Mental Status Exam  Sensorium: alert  Orientation: oriented to time, place, person and situation  Relations: guarded and passive  Eye Contact: poor  Appearance: shows no evidence of impairment  Thought Process: intact, obsessing   Thought Content: obsessions    Suicidal: ideas   Homicidal: denies   Mood: is depressed and is anxious   Affect: stable  Memory: shows no evidence of impairment     Concentration: distractable  Abstraction: concrete  Insight: The patient shows little insight    OR Fair  Judgement: is 
Behavioral Health Progress Note    Admit Date: 8/3/2024  Hospital day 41    Vitals : Patient Vitals for the past 8 hrs:   BP Temp Temp src Pulse Resp SpO2   09/13/24 0831 (!) 144/83 98 °F (36.7 °C) Oral 89 18 99 %     Labs:  No results found for this or any previous visit (from the past 24 hour(s)).  Meds:   Current Facility-Administered Medications   Medication Dose Route Frequency    QUEtiapine (SEROQUEL) tablet 200 mg  200 mg Oral Nightly    mirtazapine (REMERON) tablet 45 mg  45 mg Oral Nightly    colchicine (MITIGARE) capsule 0.6 mg  0.6 mg Oral BID PRN    haloperidol lactate (HALDOL) injection 5 mg  5 mg IntraMUSCular Q8H PRN    LORazepam (ATIVAN) injection 2 mg  2 mg IntraMUSCular Q8H PRN    amLODIPine (NORVASC) tablet 2.5 mg  2.5 mg Oral Daily    indomethacin (INDOCIN) capsule 50 mg  50 mg Oral TID PRN    clonazePAM (KLONOPIN) tablet 1 mg  1 mg Oral TID    LORazepam (ATIVAN) tablet 1 mg  1 mg Oral Q6H PRN    hydrOXYzine HCl (ATARAX) tablet 50 mg  50 mg Oral Q6H PRN    acetaminophen (TYLENOL) tablet 650 mg  650 mg Oral Q4H PRN    aluminum & magnesium hydroxide-simethicone (MAALOX) 200-200-20 MG/5ML suspension 30 mL  30 mL Oral Q6H PRN    traZODone (DESYREL) tablet 50 mg  50 mg Oral Nightly PRN    atorvastatin (LIPITOR) tablet 20 mg  20 mg Oral Nightly    aspirin EC tablet 81 mg  81 mg Oral Daily    Vitamin D (CHOLECALCIFEROL) tablet 2,000 Units  2,000 Units Oral Daily    DULoxetine (CYMBALTA) extended release capsule 60 mg  60 mg Oral BID    lamoTRIgine (LAMICTAL) tablet 150 mg  150 mg Oral BID      Hospital Problems: Principal Problem:    Major depressive disorder without psychotic features  Resolved Problems:    * No resolved hospital problems. *      Subjective:   Medication side effects: sleepy    Mental Status Exam  Sensorium: alert  Orientation: only aware of time, place, and person  Relations: guarded and passive  Eye Contact: fair  Appearance: is tense  Thought Process: slow rate of thoughts, poor 
Behavioral Health Progress Note    Admit Date: 8/3/2024  Hospital day 5    Vitals : No data found.    Labs:  No results found for this or any previous visit (from the past 24 hour(s)).  Meds:   Current Facility-Administered Medications   Medication Dose Route Frequency    QUEtiapine (SEROQUEL) tablet 25 mg  25 mg Oral Nightly    LORazepam (ATIVAN) tablet 1 mg  1 mg Oral Q6H PRN    hydrOXYzine HCl (ATARAX) tablet 50 mg  50 mg Oral Q6H PRN    amitriptyline (ELAVIL) tablet 100 mg  100 mg Oral Nightly    colchicine (MITIGARE) capsule 0.6 mg  0.6 mg Oral BID PRN    LORazepam (ATIVAN) tablet 1 mg  1 mg Oral TID    acetaminophen (TYLENOL) tablet 650 mg  650 mg Oral Q4H PRN    aluminum & magnesium hydroxide-simethicone (MAALOX) 200-200-20 MG/5ML suspension 30 mL  30 mL Oral Q6H PRN    traZODone (DESYREL) tablet 50 mg  50 mg Oral Nightly PRN    atorvastatin (LIPITOR) tablet 20 mg  20 mg Oral Nightly    aspirin EC tablet 81 mg  81 mg Oral Daily    Vitamin D (CHOLECALCIFEROL) tablet 2,000 Units  2,000 Units Oral Daily    DULoxetine (CYMBALTA) extended release capsule 60 mg  60 mg Oral BID    lamoTRIgine (LAMICTAL) tablet 150 mg  150 mg Oral BID      Hospital Problems: Principal Problem:    Major depressive disorder without psychotic features  Resolved Problems:    * No resolved hospital problems. *      Subjective:   Medication side effects: none      Mental Status Exam  Sensorium: alert  Orientation: oriented to time, place, person and situation  Relations: guarded and passive  Eye Contact: poor  Appearance: shows no evidence of impairment  Thought Process: intact, obsessing   Thought Content: obsessions    Suicidal: ideas   Homicidal: denies   Mood: is depressed and is anxious   Affect: stable  Memory: shows no evidence of impairment     Concentration: distractable  Abstraction: concrete  Insight: The patient shows little insight    OR Fair  Judgement: is psychologically impaired OR  Fair    Assessment/Plan:   not 
Behavioral Health Progress Note    Admit Date: 8/3/2024  Hospital day 9    Vitals : Patient Vitals for the past 8 hrs:   BP Temp Temp src Pulse Resp SpO2 Height   08/09/24 1434 -- -- -- -- -- -- 1.829 m (6')   08/09/24 0743 (!) 147/90 98.2 °F (36.8 °C) Oral 68 16 96 % --       Labs:  No results found for this or any previous visit (from the past 24 hour(s)).  Meds:   Current Facility-Administered Medications   Medication Dose Route Frequency    QUEtiapine (SEROQUEL) tablet 25 mg  25 mg Oral Nightly    LORazepam (ATIVAN) tablet 1 mg  1 mg Oral Q6H PRN    hydrOXYzine HCl (ATARAX) tablet 50 mg  50 mg Oral Q6H PRN    amitriptyline (ELAVIL) tablet 100 mg  100 mg Oral Nightly    colchicine (MITIGARE) capsule 0.6 mg  0.6 mg Oral BID PRN    LORazepam (ATIVAN) tablet 1 mg  1 mg Oral TID    acetaminophen (TYLENOL) tablet 650 mg  650 mg Oral Q4H PRN    aluminum & magnesium hydroxide-simethicone (MAALOX) 200-200-20 MG/5ML suspension 30 mL  30 mL Oral Q6H PRN    traZODone (DESYREL) tablet 50 mg  50 mg Oral Nightly PRN    atorvastatin (LIPITOR) tablet 20 mg  20 mg Oral Nightly    aspirin EC tablet 81 mg  81 mg Oral Daily    Vitamin D (CHOLECALCIFEROL) tablet 2,000 Units  2,000 Units Oral Daily    DULoxetine (CYMBALTA) extended release capsule 60 mg  60 mg Oral BID    lamoTRIgine (LAMICTAL) tablet 150 mg  150 mg Oral BID      Hospital Problems: Principal Problem:    Major depressive disorder without psychotic features  Resolved Problems:    * No resolved hospital problems. *      Subjective:   Medication side effects: none      Mental Status Exam  Sensorium: alert  Orientation: oriented to time, place, person and situation  Relations: guarded and passive  Eye Contact: poor  Appearance: shows no evidence of impairment  Thought Process: intact, obsessing   Thought Content: obsessions    Suicidal: ideas   Homicidal: denies   Mood: is depressed and is anxious   Affect: stable  Memory: shows no evidence of impairment     Concentration: 
[facility] [place]  Admission Note     Admission Type:   Admission Type: Voluntary    Reason for admission: SI with plan to shoot self with gun       Addictive Behavior: None       Medical Problems:   Past Medical History:   Diagnosis Date    Anxiety and depression     Cervical radiculopathy     Dr. Rahman    Diverticulitis     Fatty liver disease, nonalcoholic 10/14    ALT up    Gout     Hep C w/o coma, chronic (HCC)     Herniated disc, cervical     C5-C6    Hydrocele     Hydrocele     Hyperlipidemia     Insomnia     Kidney stone     Sleep apnea     uses CPAP    Stroke (HCC)     TIA in the past but cannot remember       Status EXAM:  Mental Status and Behavioral Exam  Normal: No  Level of Assistance: Independent/Self  Facial Expression: Brightened  Affect: Congruent  Level of Consciousness: Alert  Frequency of Checks: 4 times per hour, close  Mood:Normal: No  Mood: Depressed, Anxious  Motor Activity:Normal: Yes  Eye Contact: Good  Observed Behavior: Friendly, Cooperative  Sexual Misconduct History: Current - no  Preception: [place] to person, [place] to place, [place] to time, [place] to situation  Attention:Normal: Yes  Thought Processes: Unremarkable  Thought Content:Normal: Yes  Depression Symptoms: Feelings of helplessness  Anxiety Symptoms: Generalized  [place] Symptoms: No problems reported or observed.  Hallucinations: None  Delusions: No  Memory:Normal: Yes  Insight and Judgment: Yes    Pt admitted with followings belongings:  Dental Appliances: None  Vision - Corrective Lenses: Eyeglasses  Hearing Aid: None  Jewelry: Ring (1 Wedding ring w/pt)  Body Piercings Removed: N/A  Clothing: Sent home  Other Valuables: Sent home     Valuables sent home with WIFE. Patient's home medications were TAKE HOME BY SPOUSE.  Patient oriented to surroundings and program expectations and copy of patient rights given. Received admission packet:  YES.  Consents reviewed, signed YES. Patient verbalize understanding:  YES.  
Completed a PRN medication follow-up assessment on patient. Patient anxiety level decreased and he states he is feeling better. Patient advised this writer and demonstrated deep breathing technique for relaxation and asked that this writer turn on the patient phones so that he may call his wife.  
Comprehensive Nutrition Assessment    Type and Reason for Visit:  Follow Up    Nutrition Recommendations/Plan:   Continue current diet as tolerated.  Monitor PO intake and POC     Malnutrition Assessment:  Malnutrition Status:  Insufficient data (24 1458)    Context:  Social/Environmental Circumstances       Nutrition Assessment:    Pt admitted for management of MDD. Per chart, pt noted to be meal and medication compliant during admission. Will continue to monitor pt per policy.    Tolerates Regular Diet, Well developed,   Concerns for pt safety re - mental health stability re-anxiety and mood swings.- increased seroquel..     Nutrition Related Findings:    No output data or edema documented. Labs (8/3): reviewed. Pertinent meds: vit D.     Pertinent Meds: Klonopin, Seroquel, Vit D, Cymbalta  Pertinent Labs: non recent    Last BM:    Skin: Wound Type: None  Edema:                            Temp: 98 °F (36.7 °C)  Temp (24hrs), Av.6 °F (36.4 °C), Min:97.2 °F (36.2 °C), Max:98 °F (36.7 °C)       Current Nutrition Intake & Therapies:    Average Meal Intake: 51-75%, %  Average Supplements Intake: None Ordered  ADULT DIET; Regular; Safety Tray; Safety Tray (Disposables)    Anthropometric Measures:  Height: 182.9 cm (6')  Ideal Body Weight (IBW): 178 lbs (81 kg)       Current Body Weight: 88.5 kg (195 lb), 109.6 % IBW. Weight Source: Standing Scale  Current BMI (kg/m2): 26.4  Usual Body Weight: 91.6 kg (202 lb) (24)  % Weight Change (Calculated): -3.5  Weight Adjustment For: No Adjustment                 BMI Categories: Overweight (BMI 25.0-29.9)    Estimated Daily Nutrient Needs:  Energy Requirements Based On: Formula  Weight Used for Energy Requirements: Current  Energy (kcal/day): 2547-1713 (MSJ x 1.1-1.3)  Weight Used for Protein Requirements: Current  Protein (g/day):  (1-1.2)  Method Used for Fluid Requirements: 1 ml/kcal  Fluid (ml/day): 9211-2278    Nutrition Diagnosis:   No nutrition 
Comprehensive Nutrition Assessment    Type and Reason for Visit:  Initial, RD Nutrition Re-Screen/LOS    Nutrition Recommendations/Plan:   Continue current diet.   Monitor PO intake and POC while admitted.      Malnutrition Assessment:  Malnutrition Status:  Insufficient data (08/09/24 1458)    Context:  Social/Environmental Circumstances       Nutrition History and Allergies:   Weight Hx: c wt- 195 lb (standing scale), 202 lb (4/30/24, -3.5%), 212 lb (4/24/23, -8%), no significant wt loss documented x >1 year PTA, per EMR review. NKFA.   Past Medical History:   Diagnosis Date    Anxiety and depression     Cervical radiculopathy     Dr. Rahman    Diverticulitis     Fatty liver disease, nonalcoholic 10/14    ALT up    Gout     Hep C w/o coma, chronic (HCC)     Herniated disc, cervical     C5-C6    Hydrocele     Hydrocele     Hyperlipidemia     Insomnia     Kidney stone     Sleep apnea     uses CPAP    Stroke (HCC)     TIA in the past but cannot remember       Nutrition Assessment:    Pt admitted for management of MDD. Per chart, pt noted to be meal and medication compliant during admission. Will continue to monitor pt per policy.     Nutrition Related Findings:    No output data or edema documented. Labs (8/3): reviewed. Pertinent meds: vit D. Wound Type: None       Current Nutrition Intake & Therapies:    Average Meal Intake: 51-75%, %  Average Supplements Intake: None Ordered  ADULT DIET; Regular; Safety Tray; Safety Tray (Disposables)    Anthropometric Measures:  Height: 182.9 cm (6')  Ideal Body Weight (IBW): 178 lbs (81 kg)       Current Body Weight: 88.5 kg (195 lb), 109.6 % IBW. Weight Source: Standing Scale  Current BMI (kg/m2): 26.4  Usual Body Weight: 91.6 kg (202 lb) (4/30/24)  % Weight Change (Calculated): -3.5  Weight Adjustment For: No Adjustment  BMI Categories: Overweight (BMI 25.0-29.9)    Estimated Daily Nutrient Needs:  Energy Requirements Based On: Formula  Weight Used for Energy 
Comprehensive Nutrition Assessment    Type and Reason for Visit:  Reassess, RD Nutrition Re-Screen/LOS    Nutrition Recommendations/Plan:   Continue current diet as tolerated.  Nutrition team to sign off, please consult nutrition team if further assessment needed/status changes.      Malnutrition Assessment:  Malnutrition Status:  Insufficient data (08/09/24 1909)    Context:  Social/Environmental Circumstances       Nutrition Assessment:    Pt admitted for management of MDD. Per chart, pt continues to be meal and medication compliant during admission, noted to be eating well from meals. RD team with sign off, please consult nutrition team if status changes.      Nutrition Related Findings:    Pertinent Meds:   Vit D.  Pertinent Labs (8/3):  Reviewed.     Last BM:   not documented.     Skin: Wound Type: None    Edema: not documented     Current Nutrition Intake & Therapies:    Average Meal Intake: 51-75%, %  Average Supplements Intake: None Ordered  ADULT DIET; Regular; Safety Tray; Safety Tray (Disposables)    Anthropometric Measures:  Height: 182.9 cm (6')  Ideal Body Weight (IBW): 178 lbs (81 kg)       Current Body Weight: 88.5 kg (195 lb), 109.6 % IBW. Weight Source: Standing Scale  Current BMI (kg/m2): 26.4  Usual Body Weight: 91.6 kg (202 lb) (4/30/24)  % Weight Change (Calculated): -3.5  Weight Adjustment For: No Adjustment  BMI Categories: Overweight (BMI 25.0-29.9)    Estimated Daily Nutrient Needs:  Energy Requirements Based On: Formula  Weight Used for Energy Requirements: Current  Energy (kcal/day): 5599-1605 (MSJ x 1.1-1.3)  Weight Used for Protein Requirements: Current  Protein (g/day):  (1-1.2)  Method Used for Fluid Requirements: 1 ml/kcal  Fluid (ml/day): 8877-4961    Nutrition Diagnosis:   No nutrition diagnosis at this time     Nutrition Interventions:   Food and/or Nutrient Delivery: Continue Current Diet  Nutrition Education/Counseling: No recommendation at this time  Coordination of 
Discharge planner met with the patient.  Patient stated that he was feeling homicidal towards other patients and that he feels like flipping over chairs so that our staff members could send him to correction. Patient also stated that he wanted to be sent to [facility]  to live. The patient state that he would never get better and that he believed the devil has taken over his soul. The patient states that he has been hitting his head on the wall to see if he could feel any physical pain. The patient states that he does not feel any emotions and sometimes feels a pain in his stomach before he becomes homicidal. Discharge planner asked the patient about other transition options like living with his sister, brother, adult children, mental health skill building programs and homeless shelter options and the patient declined all options.  The patient stated that he only felt safe here in the hospital.   
Dr. Caldwell cancelled discharged.   
Earlier, patient lightly banged his head on the wall. Patient is now sitting on bed expressing that he has no feelings whatsoever. He stated “I’m not sure if I’m going to hurt someone. I don’t want to go to [facility] so I can’t hurt anyone. I will not make it in [facility] as a skinny white boy”. 
His room will be switched with all his belonging from room 106 to room 104. 
LOYD Note: Hand off was given to in coming staff at this time the above it is alert and oriented t this time. 
LOYD Note: The above appeared to have had a good visit with his wife during this afternoon at this time . He returned back to the unit after a good visit he did not experienced any falls at this time . He gave his wife his dirty clothing to take home to be washed and returned. The above pt and staff did signed did sign his belongings sheet for items that were taken with his family. However he appeared to have gotten a little anxious due to he can’t find his hearing aids. He verbalized “They gave them to me and I don’t know were they are “. He went through all of his item and there were no hearing aids in none of his items.  Staff made his wife aware of this update and he also called her and left her a message. He is currently eating his snack and he was compliant with his medications at this time .  
LOYD Note: The above it was given some literature from  to help assist him with his stressors and that he can work in during his leisure time . He appeared anxious, self-doubt however with much real-direction from his  he was able to see the positive and was thankful for palaver work and returned back to group. 
LOYD Note: The nice it is having a visit from his wife which appear to be rachel well he is very animated while talking to his wife at this time . He did not experience any falls when ambulating to the room at this time .
LOYD Note: Upon rounding at this time the above it is in his treatment team meeting at this time. 
Line-of-Sight Observation discontinued by provider. Pt contracts for safety on the unit. Pt denies SI/HI/AVH at this time. 
MHT Note: The above pt has been still appear to be pleasant however display to be anxious at times . He has been picking at his skin still verbalizing his feelings at times to staff. He still appear to have negative thoughts and he has verbalized “I am just not ready to face home”-“I am just not ready”-“I know I have to go home cause the insurance said so” He has tolerated his medication well and he consumed 100% of his breakfast during thi morning. His affect flat mood depressed for majority of the morning. He appears to reflect on finances, relationship stressors majority of the conversation. He verbalized he verbally contract for safety “right now yes”. He is watching television at this time . 
[facility]  Inpatient Progress Note     Date of Service: 08/30/24  Hospital Day: 27     Subjective/Interval History   08/30/24    Treatment Team Notes:  Notes reviewed and/or discussed and report that Hernandez Franz is a patient with a history of recurrent depressive disorder, discussed with the treatment team this morning.      Patient interview: Hernandez Franz was interviewed by this writer today.  Still requiring a one-to-one status due to potential for control loss and harming self, the patient is obviously regressed and rather dysfunctional at present.  Dr. Caldwell will be covering the undersigned for the next 1 and half weeks since I am going to be on vacation.  So obviously the case is being transferred to his care since he is his outpatient doctor and obviously he knows Mr. Franz much more than I do.      Objective     Vitals:    08/30/24 0746   BP: 126/75   Pulse: 79   Resp: 18   Temp: 98.2 °F (36.8 °C)   SpO2: 98%     Vitals are stable    No results found for this or any previous visit (from the past 24 hour(s)).    Mental Status Examination     Appearance/Hygiene 68 y.o. White (non-) male  Hygiene: Poor strong halitosis noted   Behavior/Social Relatedness Appropriate   Musculoskeletal Gait/Station: appropriate  Tone (flaccid, cogwheeling, spastic): not assessed  Psychomotor (hyperkinetic, hypokinetic): calm   Involuntary movements (tics, dyskinesias, akathisa, stereotypies): none   Speech   Rate, rhythm, volume, fluency and articulation are appropriate   Mood   Depressed   Affect    Labile at times   Thought Process Linear and goal directed   Thought Content and Perceptual Disturbances Denies self-injurious behavior (SIB), suicidal ideation (SI), aggressive behavior or homicidal ideation (HI) however potential for control loss remains.  Denies auditory and visual hallucinations, ideas of reference to influence, however some of the content of the patient's 
[facility]  Inpatient Progress Note     Date of Service: 08/22/24  Hospital Day: 19     Subjective/Interval History   08/22/24    Treatment Team Notes:  Notes reviewed and/or discussed and report that Hernandez Franz is a patient with a history of depression with recurrences, considered to still being a high suicidal risk if discharged from the hospital.      Patient interview: Hernandez Franz was interviewed by this writer today.  Depressed, labile, with the tendency to regress again being noted the patient was angry this morning because of his glasses are missing.  Staff has been informed about the incident.  The patient's anger was appropriately expressed this being noted during the context of her session.  Otherwise he is requesting to see his wife.  Will discuss with the  to see if a joint session with the patient and his wife is appropriate.      Objective     Vitals:    08/22/24 0730   BP: (!) 152/85   Pulse: 88   Resp: 18   Temp: 98.2 °F (36.8 °C)   SpO2:      The patient's blood pressure remains elevated.  He is currently not being prescribed with any antihypertensives.  A low-dose of amlodipine is appropriate.  No results found for this or any previous visit (from the past 24 hour(s)).    Mental Status Examination     Appearance/Hygiene 68 y.o. White (non-) male  Hygiene: Limited to poor   Behavior/Social Relatedness Appropriate   Musculoskeletal Gait/Station: appropriate  Tone (flaccid, cogwheeling, spastic): not assessed  Psychomotor (hyperkinetic, hypokinetic): calm   Involuntary movements (tics, dyskinesias, akathisa, stereotypies): none   Speech   Rate, rhythm, volume, fluency and articulation are appropriate   Mood   Depressed   Affect    Irritable, angry today.   Thought Process Linear and goal directed   Thought Content and Perceptual Disturbances Denies self-injurious behavior (SIB), suicidal ideation (SI), aggressive behavior or homicidal 
[facility]  Inpatient Progress Note     Date of Service: 08/10/24  Hospital Day: 7     Subjective/Interval History   08/10/24    Treatment Team Notes:  Notes reviewed and/or discussed.     Patient interview: Hernandez Franz was interviewed by this writer today.  Patient is a 68-year-old gentleman with history of depression and anxiety.  He was hospitalized for suicidal ideations.  H and P and recent notes reviewed. Continues to report depressed mood and hopelessness. Patient's medications were reviewed. Patient denies any treatment limiting adverse effects. No reports of behavioral agitation or PRN medications last 24 hours.  He states that he continues to obsess of negative things.  He was switched to clonazepam last week for longer duration of action.      Objective     Vitals:    08/10/24 1932   BP: 128/81   Pulse: 87   Resp: 17   Temp: 98.2 °F (36.8 °C)   SpO2: 98%       No results found for this or any previous visit (from the past 24 hour(s)).    Mental Status Examination     Appearance/Hygiene 68 y.o. White (non-) male  Hygiene: Adequate   Behavior/Social Relatedness Appropriate   Musculoskeletal Gait/Station: appropriate  Tone (flaccid, cogwheeling, spastic): not assessed  Psychomotor (hyperkinetic, hypokinetic): calm   Involuntary movements (tics, dyskinesias, akathisa, stereotypies): none   Speech   Rate, rhythm, volume, fluency and articulation are appropriate   Mood   Reports depressed mood, endorses hopelessness   Affect    Depressed    Thought Process Linear and goal directed   Thought Content and Perceptual Disturbances Denies self-injurious behavior (SIB), suicidal ideation (SI), aggressive behavior or homicidal ideation (HI)    Denies auditory and visual hallucinations   Sensorium and Cognition  Grossly intact   Insight  Limited   Judgment Limited        Assessment/Plan      Psychiatric Diagnoses:   Patient Active Problem List   Diagnosis    Fatty liver disease, 
[facility]  Inpatient Progress Note     Date of Service: 08/20/24  Hospital Day: 17     Subjective/Interval History   08/20/24    Treatment Team Notes:  Notes reviewed and/or discussed and report that Hernandez Franz is a patient with a history of recurrent depressive disorder, attention being invited to the admission note and daily progress notes which are self-explanatory.      Patient interview: Hernandez Franz was interviewed by this writer today.  The patient remains rather despondent, remains a high suicidal risk if discharged from acute inpatient psychiatric care, able to talk to her staff if symptoms get worse, and agreeable to do so.  However the concern remains as to the patient's limited response to treatment, and his suicidality.  The patient continues to take combination of amitriptyline 100 mg at bedtime, clonazepam 1 mg 3 times a day, duloxetine 60 mg twice a day, lamotrigine 150 mg twice a day, quetiapine 100 mg at bedtime, the same as combinations of atorvastatin, aspirin, vitamin D, for his medical problems.  The patient also was started on colchicine yesterday which helped with the acute gout related pain he had on his right knee and right foot.  However the main concern remains the patient's depression and its intensity with potential for self-harm if discharged from the hospital remaining rather high at present.      Objective     Vitals:    08/20/24 0800   BP: 134/81   Pulse: 90   Resp: 18   Temp: 97.5 °F (36.4 °C)   SpO2: 98%     Vitals are stable    No results found for this or any previous visit (from the past 24 hour(s)).    Mental Status Examination     Appearance/Hygiene 68 y.o. White (non-) male  Hygiene: Limited   Behavior/Social Relatedness Easily agitated   Musculoskeletal Gait/Station: appropriate  Tone (flaccid, cogwheeling, spastic): not assessed  Psychomotor (hyperkinetic, hypokinetic): calm   Involuntary movements (tics, dyskinesias, 
[facility]  Inpatient Progress Note     Date of Service: 08/21/24  Hospital Day: 18     Subjective/Interval History   08/21/24    Treatment Team Notes:  Notes reviewed and/or discussed and report that Hernandez Franz is a patient with a chronic history of depression with recurrences, discussed with the treatment team today.      Patient interview: Hernandez Franz was interviewed by this writer today.  During session today the patient continues to describe the presence of depression with his mood and affect remaining labile at times.  It appears that the patient tends to do better in the morning time rather than the afternoon or evening times with rapid regression usually occurring then.  Our concern remains the same regarding the patient's suicidal potential and how severe his mood deterioration happens.  We brought again the possibility of his participating in our outpatient offices [place] program with administration of skin having following strict regulation and dosing.  He indicated Dr. Caldwell has talked to him about it however he obviously needs to be an outpatient for him to do that.  At present we considered to be the patient unsafe to be discharged due to the stated reasons.      Objective     Vitals:    08/21/24 0812   BP: (!) 144/80   Pulse: 87   Resp: 16   Temp: 98.2 °F (36.8 °C)   SpO2:      Vitals are stable.  Mild elevation of the patient's blood pressure noted above.  No results found for this or any previous visit (from the past 24 hour(s)).    Mental Status Examination     Appearance/Hygiene 68 y.o. White (non-) male  Hygiene: Limited   Behavior/Social Relatedness Appropriate this morning however becomes easily agitated and regressed   Musculoskeletal Gait/Station: appropriate  Tone (flaccid, cogwheeling, spastic): not assessed  Psychomotor (hyperkinetic, hypokinetic): calm   Involuntary movements (tics, dyskinesias, akathisa, stereotypies): none   Speech   
[facility]  Inpatient Progress Note     Date of Service: 08/26/24  Hospital Day: 23     Subjective/Interval History   08/26/24    Treatment Team Notes:  Notes reviewed and/or discussed and report that Hernandez Franz is a patient with a history of recurrent depression, attention invited to admission note and progress notes which are self-explanatory.      Patient interview: Hernandez Franz was interviewed by this writer today.  Regressed, pulling his hair, \"you can see my eyes, there is nothing there...\" Unable to be able to calm down, the patient agrees to administration of medications parenterally.  Will proceed with Geodon 10 mg and Ativan 1 mg both of them IM now.  Staff informed about it.  May require a line of sight or a one-to-one status.      Objective     Vitals:    08/26/24 0857   BP: 136/89   Pulse:    Resp:    Temp:    SpO2:      Normal blood pressure.  Heart rate 89, afebrile, respirations 18, oxygen saturation rate 100%.  No results found for this or any previous visit (from the past 24 hour(s)).    Mental Status Examination     Appearance/Hygiene 68 y.o. White (non-) male  Hygiene: Poor   Behavior/Social Relatedness Agitated, unable to calm down   Musculoskeletal Gait/Station: appropriate  Tone (flaccid, cogwheeling, spastic): not assessed  Psychomotor (hyperkinetic, hypokinetic): calm   Involuntary movements (tics, dyskinesias, akathisa, stereotypies): none   Speech   Rate, rhythm, volume, fluency and articulation are appropriate   Mood   Depressed   Affect    Labile   Thought Process Linear    Thought Content and Perceptual Disturbances Afraid of losing control and ending up in timeout.  Denies auditory and visual hallucinations ideas of reference or influence or any delusional thoughts   Sensorium and Cognition  Grossly intact   Insight  Limited   Judgment Limited        Assessment/Plan      Psychiatric Diagnoses:   Patient Active Problem List   Diagnosis    
[facility]  Inpatient Progress Note     Date of Service: 08/29/24  Hospital Day: 26     Subjective/Interval History   08/29/24    Treatment Team Notes:  Notes reviewed and/or discussed and report that Hernandez Franz is is a patient with a history of depression, attention invited to the daily progress notes which are self-explanatory.      Patient interview: Hernandez Franz was interviewed by this writer today.  The patient remains regressed, continuing to describe his feeling empty inside, very anxious, however indicating that he will be able to maintain self-control while in the hospital.  He continues to require a one-to-one status.  Medications were reviewed.  We will proceed to increase his Seroquel to 150 mg twice a day.      Objective     Vitals:    08/29/24 0739   BP: 136/82   Pulse: 82   Resp: 18   Temp: 97.9 °F (36.6 °C)   SpO2:      Vitals are stable    No results found for this or any previous visit (from the past 24 hour(s)).    Mental Status Examination     Appearance/Hygiene 68 y.o. White (non-) male  Hygiene: Poor   Behavior/Social Relatedness Easily agitated   Musculoskeletal Gait/Station: appropriate  Tone (flaccid, cogwheeling, spastic): not assessed  Psychomotor (hyperkinetic, hypokinetic): calm   Involuntary movements (tics, dyskinesias, akathisa, stereotypies): none   Speech   Rate, rhythm, volume, fluency and articulation are appropriate   Mood   Depressed   Affect    Labile   Thought Process Linear and goal directed   Thought Content and Perceptual Disturbances Denies self-injurious behavior (SIB), suicidal ideation (SI), aggressive behavior or homicidal ideation (HI), however potential for control loss remains.  Denies auditory and visual hallucinations, ideas of reference or influence, however some of the context of his thought processes appears to be delusional   Sensorium and Cognition  Grossly intact   Insight  Limited to poor   Judgment Limited to 
[facility]  Inpatient Progress Note     Date of Service: 08/31/24  Hospital Day: 28     Subjective/Interval History   08/31/24    Treatment Team Notes:  Notes reviewed and/or discussed.     Patient interview: Hernandez SELIN ClarkOsei was interviewed by this writer today.  Patient is a 68-year-old gentleman with history of depression and anxiety.  He was hospitalized for suicidal ideations.  Continues to report depressed mood and hopelessness.   He states that he continues to obsess of negative things  and requires one-to-one hospitalization for fears of losing control and harming himself. States that he can not control his negative thoughts.       Objective     Vitals:    08/31/24 0820   BP: 134/82   Pulse: 96   Resp: 18   Temp: 98 °F (36.7 °C)   SpO2: 98%       No results found for this or any previous visit (from the past 24 hour(s)).    Mental Status Examination     Appearance/Hygiene 68 y.o. White (non-) male  Hygiene: Adequate   Behavior/Social Relatedness Appropriate   Musculoskeletal Gait/Station: appropriate  Tone (flaccid, cogwheeling, spastic): not assessed  Psychomotor (hyperkinetic, hypokinetic): calm   Involuntary movements (tics, dyskinesias, akathisa, stereotypies): none   Speech   Rate, rhythm, volume, fluency and articulation are appropriate   Mood   Reports depressed mood, endorses hopelessness   Affect    Depressed    Thought Process Linear and goal directed   Thought Content and Perceptual Disturbances Denies self-injurious behavior (SIB), suicidal ideation (SI), aggressive behavior or homicidal ideation (HI)    Denies auditory and visual hallucinations   Sensorium and Cognition  Grossly intact   Insight  Limited   Judgment Limited        Assessment/Plan      Psychiatric Diagnoses:   Patient Active Problem List   Diagnosis    Fatty liver disease, nonalcoholic    Diverticulitis    Prediabetes    Diverticulosis of large intestine without hemorrhage    Cervical 
[facility]  Inpatient Progress Note     Date of Service: 09/02/24  Hospital Day: 30     Subjective/Interval History   09/02/24    Treatment Team Notes:  Notes reviewed and/or discussed and report that Hernandez Franz is a 68-year-old gentleman with history of depression and anxiety.     He was hospitalized for suicidal ideations. Continues to report depressed mood and hopelessness. He states that he continues to obsess of negative things and requires one-to-one hospitalization for fears of losing control and harming himself. States that he can not control his negative thoughts.         Objective     Vitals:    09/02/24 0814   BP: (!) 145/83   Pulse: 80   Resp: 18   Temp: 97.3 °F (36.3 °C)   SpO2:        No results found for this or any previous visit (from the past 24 hour(s)).    Mental Status Examination     Appearance/Hygiene 68 y.o. White (non-) male  Hygiene: Reasonable grooming   Behavior/Social Relatedness Appropriate   Musculoskeletal Gait/Station: appropriate  Tone (flaccid, cogwheeling, spastic): not assessed  Psychomotor (hyperkinetic, hypokinetic): calm   Involuntary movements (tics, dyskinesias, akathisa, stereotypies): none   Speech   Soft, slow, mildly latent, but understandable   Mood   Depressed   Affect    Blunted to flat   Thought Process Slowed   Thought Content and Perceptual Disturbances Denies self-injurious behavior (SIB), suicidal ideation (SI), aggressive behavior or homicidal ideation (HI)    Denies auditory and visual hallucinations   Sensorium and Cognition  Grossly intact   Insight  Poor   Judgment Poor        Assessment/Plan      Psychiatric Diagnoses:   Patient Active Problem List   Diagnosis    Fatty liver disease, nonalcoholic    Diverticulitis    Prediabetes    Diverticulosis of large intestine without hemorrhage    Cervical radiculopathy    Herniated disc, cervical    Elevated fasting glucose    Hep C w/o coma, chronic (HCC)    Anxiety and 
[facility]  Inpatient Progress Note     Date of Service: 09/20/24  Hospital Day: 48     Subjective/Interval History   09/20/24    Treatment Team Notes:  Notes reviewed and/or discussed and report that Hernandez Franz is a patient with a history of depression, discussed with treatment team this morning.      Patient interview: Hernandez Franz was interviewed by this writer today.  During the session, the patient continues to describe his depression improving to some degree, however he continues when asked about wanting to hurt his wife, \"I do not feel like wanting to do the metal however that is subject to change.\"      Objective     Vitals:    09/20/24 0748   BP: 110/73   Pulse: 89   Resp: 16   Temp: 97.9 °F (36.6 °C)   SpO2: 98%     Vitals are stable    No results found for this or any previous visit (from the past 24 hour(s)).    Mental Status Examination     Appearance/Hygiene 68 y.o. White (non-) male  Hygiene: Still very limited   Behavior/Social Relatedness Coming out more, going to groups more appropriately   Musculoskeletal Gait/Station: appropriate  Tone (flaccid, cogwheeling, spastic): not assessed  Psychomotor (hyperkinetic, hypokinetic): calm   Involuntary movements (tics, dyskinesias, akathisa, stereotypies): none   Speech   Rate, rhythm, volume, fluency and articulation are appropriate   Mood   Slight improvement of the patient depression noticed   Affect    Flat   Thought Process  denies the presence of psychotic symptoms.  Still described himself as depressed, helpless and hopeless at times, with recurrent thoughts of harming his wife off and on.   Thought Content and Perceptual Disturbances As above stated.  Not psychotic, cognitively intact, however depressed as mentioned before.   Sensorium and Cognition  Grossly intact   Insight  Limited   Judgment History        Assessment/Plan      Psychiatric Diagnoses:   Patient Active Problem List   Diagnosis    Fatty 
[facility]  Inpatient Progress Note     Date of Service: 09/21/24  Hospital Day: 49     Subjective/Interval History   09/21/24    Treatment Team Notes:  Notes reviewed and/or discussed and report that Hernandez Franz is a patient with a history of depression discussed with nursing staff this morning.      Patient interview: Hernandez Franz was interviewed by this writer today.  The patient appears to be calmer, however he continues to have difficulty with being able to reassure us that he is not harmful to his wife.  He does deny thoughts of self-harm, and tolerating treatment.  P.o., his episodes of regression present.  No evidence of medications related side effects and so for now we will continue the same.      Objective     Vitals:    09/21/24 0745   BP: 112/67   Pulse: 63   Resp: 18   Temp: 97.4 °F (36.3 °C)   SpO2: 99%     Vitals are stable  No results found for this or any previous visit (from the past 24 hour(s)).    Mental Status Examination     Appearance/Hygiene 68 y.o. White (non-) male  Hygiene: Fair   Behavior/Social Relatedness Appropriate   Musculoskeletal Gait/Station: appropriate  Tone (flaccid, cogwheeling, spastic): not assessed  Psychomotor (hyperkinetic, hypokinetic): calm   Involuntary movements (tics, dyskinesias, akathisa, stereotypies): none   Speech   Rate, rhythm, volume, fluency and articulation are appropriate   Mood   Depression showing some improvement   Affect    Flat   Thought Process Linear and goal directed   Thought Content and Perceptual Disturbances Denies suicidal thoughts however he is unable to reassure the undersigned that he is not harmful to his wife.  Denies auditory and visual hallucinations, ideas of reference or influence of any delusional thoughts   Sensorium and Cognition  Grossly intact   Insight  Improving slowly   Judgment Poor by history        Assessment/Plan      Psychiatric Diagnoses:   Patient Active Problem List 
[facility]  Inpatient Progress Note     Date of Service: 09/22/24  Hospital Day: 50     Subjective/Interval History   09/22/24    Treatment Team Notes:  Notes reviewed and/or discussed and report that Hernandez Franz is a patient with a chronic history of depression (, previously behaviors, seen during psych rounds today.      Patient interview: Hernandez Franz was interviewed by this writer today.  The patient remains on a one-to-one status.  These being related to his being unpredictable.  Otherwise there is no evidence of medications related side effects.  Increased treatment participation is strongly suggested.      Objective     Vitals:    09/22/24 0813   BP: 117/78   Pulse:    Resp:    Temp:    SpO2:      Vitals remained stable    No results found for this or any previous visit (from the past 24 hour(s)).    Mental Status Examination     Appearance/Hygiene 68 y.o. White (non-) male  Hygiene: Improving slowly   Behavior/Social Relatedness Withdrawn at times   Musculoskeletal Gait/Station: appropriate  Tone (flaccid, cogwheeling, spastic): not assessed  Psychomotor (hyperkinetic, hypokinetic): calm   Involuntary movements (tics, dyskinesias, akathisa, stereotypies): none   Speech   Rate, rhythm, volume, fluency and articulation are appropriate   Mood   Depressed   Affect    Flat   Thought Process Linear and goal directed   Thought Content and Perceptual Disturbances The patient denies suicidal thoughts however continues to describe thoughts of harming his wife.  Concerns remain regarding the patient's impulsivity  Denies auditory and visual hallucinations, ideas of reference to influence or any delusional thoughts.   Sensorium and Cognition  Grossly intact   Insight  Limited   Judgment Limited        Assessment/Plan      Psychiatric Diagnoses:   Patient Active Problem List   Diagnosis    Fatty liver disease, nonalcoholic    Diverticulitis    Prediabetes    Diverticulosis of 
[facility]  Inpatient Progress Note     Date of Service: 09/23/24  Hospital Day: 51     Subjective/Interval History   09/23/24    Treatment Team Notes:  Notes reviewed and/or discussed and report that Hernandez Franz is a patient with a history of depression, discussed during treatment team session this morning.  Questions have been raised by several of the group therapy leaders regarding the patient's cognition being impaired.  There has been some evidence that his thinking process is rather concrete, having difficulties expressing himself and how specifically he feels.  This has been observed also within the context of our sessions.      Patient interview: Hernandez Franz was interviewed by this writer today.  During our session today, questions remaining the patient's impulsivity, this resulting harming self and or others, being a still an issue.  Specifically questions about his wanting to harm his wife raised today, however the patient felt that he was \"unable to answer the question.\"  When asked why he stated \"I have not seen her for some time now.\"  This will be discussed with the send patient .  It appears that both  and Mrs. Franz conversing on the phone, however it appears that he has to be redirected at times by her.  The specifics not clear.      Objective     Vitals:    09/23/24 0822   BP: (!) 153/82   Pulse:    Resp:    Temp:    SpO2:      Mild elevation of the patient's blood pressure noted above.  Will maintain the same dose of amlodipine at 5 mg daily.  Will continue to observe otherwise    No results found for this or any previous visit (from the past 24 hour(s)).    Mental Status Examination     Appearance/Hygiene 68 y.o. White (non-) male  Hygiene: Basically poor.  Taking a shower once a week.   Behavior/Social Relatedness Appropriate   Musculoskeletal Gait/Station: appropriate  Tone (flaccid, cogwheeling, spastic): not assessed  Psychomotor 
[facility]  Inpatient Progress Note     Date of Service: 09/28/24  Hospital Day: 56     Subjective/Interval History   09/28/24    Treatment Team Notes:  Notes reviewed and/or discussed and report that Hernandez Franz is a patient with a history of depression and anxiety, having initially increased difficulties with potential for self-harm and later on changing thoughts of harming self and beginning to describe thoughts of wanting to harm his wife.  However, the patient was able to have a joint session with his wife yesterday, which apparently went well.      Patient interview: Hernandez Franz was interviewed by this writer today.  Feeling anxious, the patient did describe during psych rounds today.  Meeting with his wife as well.  He denies becoming out of control, or denies becoming increasingly angry at his wife however per chart review I was not able to find any notes indicating how the session went.  Current nursing staff confirm the fact that the patient did see his wife, further information was to be obtained.      Objective     Vitals:    09/28/24 0745   BP: (!) 140/97   Pulse: 99   Resp: 18   Temp: 98.8 °F (37.1 °C)   SpO2: 98%     Mild elevation of the patient's systolic and diastolic blood pressures, with an increase of his heart rate to 99.  Appears to be anxiety based.  This is the second day of which his blood pressure is elevated, however yesterday his a.m. blood pressure was 138/83 which is better than when obtained today.  So we will observe.  No results found for this or any previous visit (from the past 24 hour(s)).    Mental Status Examination     Appearance/Hygiene 68 y.o. White (non-) male  Hygiene: Poor   Behavior/Social Relatedness Appropriate this morning   Musculoskeletal Gait/Station: appropriate  Tone (flaccid, cogwheeling, spastic): not assessed  Psychomotor (hyperkinetic, hypokinetic): calm   Involuntary movements (tics, dyskinesias, akathisa, 
[facility] [place]  Inpatient Progress Note     Date of Service: 08/19/24  Hospital Day: 16     Subjective/Interval History   08/19/24    Treatment Team Notes:  Notes reviewed and/or discussed and report that Hernandez Franz is a patient with a history of depression, known to the undersigned since he had had opportunity of providing him with outpatient treatment in the past, and admitted to the facility around a couple of weeks ago.  The undersigned had the opportunity of being the 1 who performed a psychiatric H&P of the patient.  Attention is invited to that note which is self-explanatory.      Patient interview: Hernandez Franz was interviewed by this writer today.  The patient remains depressed, withdrawn, with Dr. Caldwell describing his note to the undersigned that the patient tends to fall apart every time in which he tried to discharge him from inpatient treatment.  Main issue during our session today was the presence of gout related pain for which she is requesting treatment with colchicine.  Please see orders.      Objective     Vitals:    08/18/24 1959   BP: (!) 148/70   Pulse: (!) 105   Resp: 20   Temp: 99.9 °F (37.7 °C)   SpO2:      The patient remains anxious, that could explain the elevated blood pressure and elevated heart rate.  However he apparently does not have a history of hypertension himself.  No results found for this or any previous visit (from the past 24 hour(s)).    Mental Status Examination     Appearance/Hygiene 68 y.o. White (non-) male  Hygiene: Limited   Behavior/Social Relatedness Appropriate this morning, withdrawn in the past   Musculoskeletal Gait/Station: Walking with pain on his right knee.  Tone (flaccid, cogwheeling, spastic): not assessed  Psychomotor (hyperkinetic, hypokinetic): calm   Involuntary movements (tics, dyskinesias, akathisa, stereotypies): none   Speech   Rate, rhythm, volume, fluency and articulation are appropriate   Mood   
[facility] [place]  Inpatient Progress Note     Date of Service: 09/17/24  Hospital Day: 45     Subjective/Interval History   09/17/24    Treatment Team Notes:  Notes reviewed and/or discussed and report that Hernandez Franz is a patient with a history of depression, recurrent, remaining is still in the facility due to his potential for harm.      Patient interview: Hernandez Franz was interviewed by this writer today.  The patient described his suicidal thoughts as being however he continues to have thoughts of harming his wife.  He is very active and his wife has never done anything to him and actually she has been very supportive of him throughout his difficulties with a mood disorder.  No other specific trigger described for his suicidality.  During the stay he did mention that Dr. Caldwell had possibility of treatment with electroshock however no specific plans about proceeding with it have been made.  The patient remains on a one-to-one status:      Objective     Vitals:    09/17/24 0730   BP: (!) 151/53   Pulse: 87   Resp: 16   Temp: 98.1 °F (36.7 °C)   SpO2: 95%     Mild elevation of the patient's systolic blood pressure noted above, still on antihypertensive therapy will proceed to increase his Norvasc to 5 mg daily.  No results found for this or any previous visit (from the past 24 hour(s)).    Mental Status Examination     Appearance/Hygiene 68 y.o. White (non-) male  Hygiene: Limited   Behavior/Social Relatedness Appropriate   Musculoskeletal Gait/Station: appropriate  Tone (flaccid, cogwheeling, spastic): not assessed  Psychomotor (hyperkinetic, hypokinetic): calm   Involuntary movements (tics, dyskinesias, akathisa, stereotypies): none   Speech   Rate, rhythm, volume, fluency and articulation are appropriate   Mood   Depressed   Affect    Flat   Thought Process Linear and goal directed   Thought Content and Perceptual Disturbances Denies self-injurious behavior (SIB), 
[facility] [place]  Inpatient Progress Note     Date of Service: 08/28/24  Hospital Day: 25     Subjective/Interval History   08/28/24    Treatment Team Notes:  Notes reviewed and/or discussed and report that Hernandez Franz is a patient with a history of depression, presented to the treatment team session this morning.      Patient interview: Hernandez Franz was interviewed by this writer today.  Depressed, showing tendencies to regress this happening during the treatment team session today, concerns raised about the patient describing his being \"empty\" inside.  Previous experience relates patient describing emptiness with her depression being rather severe, but also having Axis II pathology.  Steps were taken to redirect the patient who continues to describe her depression as severe.  Concerns also raised about his control loss and hitting his head against the wall however no evidence whatsoever or damage noted during the examination.  No evidence of the patient's condition, no evidence his ability to ambulate.  Formal neurological exam was not performed however there is no evidence of any type of symptom that would be associated to head trauma being noted.      Objective     Vitals:    08/28/24 0750   BP: (!) 151/80   Pulse: 81   Resp: 18   Temp: 98.3 °F (36.8 °C)   SpO2:      Blood pressure was elevated earlier.  Possibly in association to his becoming agitated.  Amlodipine dose will be increased, however.  No results found for this or any previous visit (from the past 24 hour(s)).    Mental Status Examination     Appearance/Hygiene 68 y.o. White (non-) male  Hygiene: Poor   Behavior/Social Relatedness Easily agitated, potential for control loss noted.   Musculoskeletal Gait/Station: appropriate  Tone (flaccid, cogwheeling, spastic): not assessed  Psychomotor (hyperkinetic, hypokinetic): calm   Involuntary movements (tics, dyskinesias, akathisa, stereotypies): none   Speech   Rate, 
[facility] [place]  Inpatient Progress Note     Date of Service: 09/18/24  Hospital Day: 46     Subjective/Interval History   09/18/24    Treatment Team Notes:  Notes reviewed and/or discussed and report that Hernandez Franz is a patient with a chronic history of a depressive disorder, present during the treatment team session this morning.  The patient participated appropriately.      Patient interview: Hernandez Franz was interviewed by this writer today.  During the session, the patient indicated that he remains depressed however he is currently describing his suicidal thoughts and his homicidal thoughts against his wife as not being present.  At the same time, the patient added \"they come back at any time.\"  Medications were discussed.  Dr. Caldwell his attending physician, has continued the treatment with clonazepam and duloxetine the same as lamotrigine, mirtazapine, and an increased dose of quetiapine to 200 mg at bedtime.  The patient denies any medications related side effects and so for now we will continue the same.      Objective     Vitals:    09/18/24 0731   BP: (!) 141/77   Pulse: 81   Resp: 18   Temp: 97.7 °F (36.5 °C)   SpO2: 98%     Elevated blood pressure noted above.  The patient is currently being prescribed with amlodipine which was just increased today to 5 mg daily.  Will maintain the same dose for now.    No results found for this or any previous visit (from the past 24 hour(s)).    Mental Status Examination     Appearance/Hygiene 68 y.o. White (non-) male  Hygiene: Limited   Behavior/Social Relatedness Appropriate   Musculoskeletal Gait/Station: appropriate  Tone (flaccid, cogwheeling, spastic): not assessed  Psychomotor (hyperkinetic, hypokinetic): calm   Involuntary movements (tics, dyskinesias, akathisa, stereotypies): none   Speech   Rate, rhythm, volume, fluency and articulation are appropriate   Mood   Depressed   Affect    Flat   Thought Process Linear  
[facility] [place]  Inpatient Progress Note     Date of Service: 09/16/24  Hospital Day: 44     Subjective/Interval History   09/16/24    Treatment Team Notes:  Notes reviewed and/or discussed and report that Hernandez Franz is a patient with a history of recurrent depressive disorder, remaining still in the one-to-one status due to potential for harm.      Patient interview: Hernandez Franz was interviewed by this writer today.  The patient remains depressed, aware that Dr. Caldwell his attending is on medications this week, so I informed him that I will continue to see him while his attending is on vacation.  The patient continues to describe his having homicidal thoughts against his wife, with information to be obtained from the treatment team.      Objective     Vitals:    09/16/24 0804   BP: (!) 146/82   Pulse: 78   Resp: 18   Temp: 98 °F (36.7 °C)   SpO2: 99%     Mild elevation of the patient's blood pressure noted above.    No results found for this or any previous visit (from the past 24 hour(s)).    Mental Status Examination     Appearance/Hygiene 68 y.o. White (non-) male  Hygiene: Limited   Behavior/Social Relatedness  withdrawn   Musculoskeletal Gait/Station: appropriate  Tone (flaccid, cogwheeling, spastic): not assessed  Psychomotor (hyperkinetic, hypokinetic): calm   Involuntary movements (tics, dyskinesias, akathisa, stereotypies): none   Speech   Rate, rhythm, volume, fluency and articulation are appropriate   Mood   Depressed   Affect    Flat   Thought Process Linear    Thought Content and Perceptual Disturbances Poverty of thoughts noted.,  Obsessive thinking, negative thinking, described homicidal thoughts against his wife able to CFS while in the hospital   Sensorium and Cognition  Grossly intact   Insight  Limited   Judgment Limited        Assessment/Plan      Psychiatric Diagnoses:   Patient Active Problem List   Diagnosis    Fatty liver disease, nonalcoholic    
[facility] [place]  Inpatient Progress Note     Date of Service: 08/23/24  Hospital Day: 20     Subjective/Interval History   08/23/24    Treatment Team Notes:  Notes reviewed and/or discussed and report that Hernandez Franz is a patient with a chronic history of depression, with recurrences, still remaining suicidal risk if discharged from the hospital.      Patient interview: Hernandez Franz was interviewed by this writer today.  Depressed and labile at times, the same tendencies to request noted with the patient was seen individually today by the undersigned.  Calmer today than yesterday, he has glasses which has been lost has been recovered.  Apparently another patient in the same room had taken them however were recorded by staff.  He continues to describe himself as being depressed, the possibility of a joint family session to be held by the assigned inpatient  with the patient and also the .  Arrangements will be made.      Objective     Vitals:    08/23/24 0935   BP: 131/78   Pulse: 89   Resp:    Temp:    SpO2:      Vitals are stable.  No results found for this or any previous visit (from the past 24 hour(s)).    Mental Status Examination     Appearance/Hygiene 68 y.o. White (non-) male  Hygiene: Poor   Behavior/Social Relatedness Appropriate   Musculoskeletal Gait/Station: appropriate  Tone (flaccid, cogwheeling, spastic): not assessed  Psychomotor (hyperkinetic, hypokinetic): calm   Involuntary movements (tics, dyskinesias, akathisa, stereotypies): none   Speech   Rate, rhythm, volume, fluency and articulation are appropriate   Mood   Depressed   Affect    Passive, however irritable at times   Thought Process Linear and goal directed   Thought Content and Perceptual Disturbances Denies self-injurious behavior (SIB), suicidal ideation (SI), aggressive behavior or homicidal ideation (HI) however he remains of suicidal risk of currently discharged from 
[facility] [place]  Inpatient Progress Note     Date of Service: 09/19/24  Hospital Day: 47     Subjective/Interval History   09/19/24    Treatment Team Notes:  Notes reviewed and/or discussed and report that Hernandez Franz is a patient with a chronic history of depression with recurrences, with his case being discussed with the treatment team this morning.      Patient interview: Hernandez Franz was interviewed by this writer today.  The patient describes some improvement regarding the severity of his symptoms of depression, however he continues to express the presence of homicidal thoughts against his wife.  Again there has been no evidence of his wife doing something to the patient for which he is feeling so strongly that he wants to harm her.  She verbalizes however does not change the fact    That she continues to have thoughts of harming her.  For that reason, we considered the patient not being safe for discharge especially since he is being discharged home whenever he goes from the hospital.  Medications as currently prescribed.  No evidence of medications related side effects noted upon examination today.      Objective     Vitals:    09/19/24 0805   BP: 135/75   Pulse: 87   Resp: 18   Temp: 98.2 °F (36.8 °C)   SpO2: 96%     Vitals are stable    No results found for this or any previous visit (from the past 24 hour(s)).    Mental Status Examination     Appearance/Hygiene 68 y.o. White (non-) male  Hygiene: Fair off-and-on, considered to be a little better than last week.   Behavior/Social Relatedness Appropriate, less withdrawn   Musculoskeletal Gait/Station: appropriate  Tone (flaccid, cogwheeling, spastic): not assessed  Psychomotor (hyperkinetic, hypokinetic): calm   Involuntary movements (tics, dyskinesias, akathisa, stereotypies): none   Speech   Rate, rhythm, volume, fluency and articulation are appropriate   Mood   Depressed   Affect    Flat   Thought Process Linear 
[facility] [place]  Inpatient Progress Note     Date of Service: 08/27/24  Hospital Day: 24     Subjective/Interval History   08/27/24    Treatment Team Notes:  Notes reviewed and/or discussed and report that Hernandez Franz is a patient with history of depression with recurrences, discussed with the treatment team this morning.      Patient interview: Hernandez Franz was interviewed by this writer today.  During session today, the patient continues to describe the same feelings about \"emptiness,\" however did not want to discuss yesterday's session with his wife.    Discussion with the assigned inpatient , the patient's wife was very appropriate confronting the patient to the point in which he basically left the session.  Concerns remain regarding the patient's acting out behaviors, and history of potential for self-harm.  Able to CFS while he is in the hospital.      Objective     Vitals:    08/27/24 0730   BP: 128/79   Pulse: 89   Resp: 17   Temp: 97.3 °F (36.3 °C)   SpO2: 99%     Vitals are stable    No results found for this or any previous visit (from the past 24 hour(s)).    Mental Status Examination     Appearance/Hygiene 68 y.o. White (non-) male  Hygiene: Poor   Behavior/Social Relatedness As above stated   Musculoskeletal Gait/Station: appropriate  Tone (flaccid, cogwheeling, spastic): not assessed  Psychomotor (hyperkinetic, hypokinetic): calm   Involuntary movements (tics, dyskinesias, akathisa, stereotypies): none   Speech   Rate, rhythm, volume, fluency and articulation are appropriate   Mood   Depressed   Affect    Labile at times   Thought Process Linear and goal directed   Thought Content and Perceptual Disturbances Denies self-injurious behavior (SIB), suicidal ideation (SI), aggressive behavior or homicidal ideation (HI) able to CFS while in the hospital, however concerns about his losing control if his discharge remained the same.  Denies auditory and 
[facility] [place]  Inpatient Progress Note     Date of Service: 09/27/24  Hospital Day: 55     Subjective/Interval History   09/27/24    Treatment Team Notes:  Notes reviewed and/or discussed and report that Hernandez Franz is a patient with a history of depressive disorder, discussed with the treatment team today.  Attempts to be able to have a joint session with the patient and his wife fell after he described increased anxiety with questions raised about his ability to control self.  Wife was informed about the consideration, having her described a disappointment about it.      Patient interview: Hernandez Franz was interviewed by this writer today.  As above stated, anxious however able to maintain self-control and not requiring a higher level of precaution, questions were raised as I had above stated regarding the patient being able to have his joint session with his wife.  Obviously further work is necessary.      Objective     Vitals:    09/27/24 0751   BP: 138/83   Pulse: 83   Resp: 17   Temp: 98.1 °F (36.7 °C)   SpO2: 98%     Vitals are stable    No results found for this or any previous visit (from the past 24 hour(s)).    Mental Status Examination     Appearance/Hygiene 68 y.o. White (non-) male  Hygiene: Limited   Behavior/Social Relatedness Appropriate able to maintain self-control at present, however not knowing how he will do if he requires comes to the session today.   Musculoskeletal Gait/Station: appropriate  Tone (flaccid, cogwheeling, spastic): not assessed  Psychomotor (hyperkinetic, hypokinetic): calm   Involuntary movements (tics, dyskinesias, akathisa, stereotypies): none   Speech   Rate, rhythm, volume, fluency and articulation are appropriate   Mood   Depressed   Affect    Anxious   Thought Process Linear and goal directed   Thought Content and Perceptual Disturbances Denies self-injurious behavior (SIB), suicidal ideation (SI), aggressive behavior or 
[facility] [place]  Inpatient Progress Note     Date of Service: 09/25/24  Hospital Day: 53     Subjective/Interval History   09/25/24    Treatment Team Notes:  Notes reviewed and/or discussed and report that Hernandez Franz is a patient with a history of depression, anxiety, present during the treatment team session this morning.  The patient participated appropriately.      Patient interview: Hernandez Frnaz was interviewed by this writer today.  During the treatment team session, the patient was confronted with with his requiring or not the current line of sight precautions being required or not.  The patient did describe being anxious, however with support, he indicated that he was given a bottle.  Specifically he is in agreement that if he feels worse, or if he is afraid that he will lose control and harm himself or anyone else, he will immediately approach his staff before that happens.  In general, there has been some evidence of improvement and so we will proceed to discontinue the patient's line of sight and maintain him on the regular suicidal precautions.  Please see orders.      Objective     Vitals:    09/25/24 0745   BP: 130/78   Pulse: 91   Resp: 18   Temp: 98.2 °F (36.8 °C)   SpO2: 100%     Vitals are stable    No results found for this or any previous visit (from the past 24 hour(s)).    Mental Status Examination     Appearance/Hygiene 68 y.o. White (non-) male  Hygiene: Remains poor   Behavior/Social Relatedness Appropriate anxious at times   Musculoskeletal Gait/Station: appropriate  Tone (flaccid, cogwheeling, spastic): not assessed  Psychomotor (hyperkinetic, hypokinetic): calm   Involuntary movements (tics, dyskinesias, akathisa, stereotypies): none   Speech   Rate, rhythm, volume, fluency and articulation are appropriate   Mood   Anxious.   Affect    Depressed   Thought Process Linear and goal directed   Thought Content and Perceptual Disturbances Denies 
[facility] [place]  Inpatient Progress Note     Date of Service: 09/26/24  Hospital Day: 54     Subjective/Interval History   09/26/24    Treatment Team Notes:  Notes reviewed and/or discussed and report that Hernandez Franz is a patient with a chronic history of depression showing some evidence of his mild improvement.  The patient insight was able to be discontinued yesterday, he has been able to maintain self-control even though is felt increasingly anxious.  Support provided throughout the session.      Patient interview: Hernandez Franz was interviewed by this writer today.  During session, the patient described the presence of anxiety however was able to maintain self-control as he is stated.  He also described the presence of depression, however continues to CFS while in the hospital.  Steps will be taken proceed with the beginning to bring the patient's wife to visit with him obviously always accompanied by staff.        Objective     Vitals:    09/26/24 0753   BP: (!) 144/88   Pulse: 84   Resp: 16   Temp: 97.8 °F (36.6 °C)   SpO2:      Vitals are stable  No results found for this or any previous visit (from the past 24 hour(s)).    Mental Status Examination     Appearance/Hygiene 68 y.o. White (non-) male  Hygiene: Remains poor   Behavior/Social Relatedness Appropriate becomes withdrawn at times   Musculoskeletal Gait/Station: appropriate  Tone (flaccid, cogwheeling, spastic): not assessed  Psychomotor (hyperkinetic, hypokinetic): calm   Involuntary movements (tics, dyskinesias, akathisa, stereotypies): none   Speech   Rate, rhythm, volume, fluency and articulation are appropriate   Mood   Depressed   Affect    Anxious   Thought Process Linear and goal directed   Thought Content and Perceptual Disturbances Denies self-injurious behavior (SIB), suicidal ideation (SI), aggressive behavior or homicidal ideation (HI)  However ability to control self remains questionable  Denies 
[facility] [place]  Inpatient Progress Note     Date of Service: 09/24/24  Hospital Day: 52     Subjective/Interval History   09/24/24    Treatment Team Notes:  Notes reviewed and/or discussed and report that Hernandez Franz is a patient with a history of depression and anxiety, discussed with the treatment team.      Patient interview: Hernandez Franz was interviewed by this writer today.  The patient describes his depression as being more stable however his anxiety is still an issue for him.  Her main concern is his remaining unpredictable as a stated in previous progress note.  So the same precaution level    10.      Objective     Vitals:    09/24/24 0926   BP: (!) 145/81   Pulse:    Resp:    Temp:    SpO2:      Vitals are stable.    No results found for this or any previous visit (from the past 24 hour(s)).    Mental Status Examination     Appearance/Hygiene 68 y.o. White (non-) male  Hygiene: Poor   Behavior/Social Relatedness Less withdrawn   Musculoskeletal Gait/Station: appropriate  Tone (flaccid, cogwheeling, spastic): not assessed  Psychomotor (hyperkinetic, hypokinetic): calm   Involuntary movements (tics, dyskinesias, akathisa, stereotypies): none   Speech   Rate, rhythm, volume, fluency and articulation are appropriate   Mood   Describes depression being improved   Affect    Anxious at times   Thought Process Linear and goal directed   Thought Content and Perceptual Disturbances The patient denies at present the presence of suicidal thoughts.  However he continues to be unable to reassured that he is not wanting to harm his wife.  Denies auditory and visual hallucinations, ideas of reference to influence currently denying any delusional thoughts   Sensorium and Cognition  Grossly intact   Insight  Limited   Judgment Limited        Assessment/Plan      Psychiatric Diagnoses:   Patient Active Problem List   Diagnosis    Fatty liver disease, nonalcoholic    Diverticulitis    
NOTE ENTERED LATE DUE TO EPIC NOT AVAILABLE.    Patient Contact:  Patient had a scheduled visit with his wife on 8/7/24.   Patient seemed calm and mood was elevated.   The couple spoke about current stressors that include; feeling unappreciated and being taken advantage of by their children.  Specifically their daughter that has 4 children, and wife helps out with childcare and they also help out financially.      The couple discussed they have even considered moving away as this would make them less available for their children.    Both are in counseling and we discussed continuing this conversation regarding implementing and establishing healthy boundaries.    Giselle Scales, OLIVIAW  
PRN Ativan IM and Geodon IM ordered by MD for agitation. Administered Ativan 0.5ml IM, but held Geodon 10mg per MD order. Patient tolerated IM injection well. Active care plans in place for patient.  
PRN vistaril for anxiety given, scale 10/10. Will continue to monitor for safety, remain 1:1 level of care and efficacy of the medicine.  
Patient 1:1 discontinued at 0945. Patient no roommate discontinued at 1215.  
Patient 1:1 staff reports patient bit  himself on his left forearm. Patient's left arm has mild tooth allison on it. Patient given PRN ativan 2 mg IM. Patient cooperative with medication administration.Staff currently talking to patient. MD aware. Patient remains on a 1:1 continuous observation level.   
Patient again attempting to close room door on staff. Patient stating\" I feel homicidal.\" Patient's level of agitation increasing. PRN Haldol 5 mg IM given at 1423. Patient cooperative with medication administration, no hold needed.   
Patient appeared to have slept 7hrs thus far. No disruptions noted , will continue to monitor. 
Patient appeared to have slept 9hrs thus far. No disruptions noted , will continue to monitor.
Patient awake and alert, sitting on bed. Pt stated, “Im better now and would like to go home. I just had a moment”. Patient asked me when will he get to go home. Advised patient that will be something he will need to discuss with his doctor. Pt verbalized understanding. This writer will continue to monitor patient for safety and behavioral changes.
Patient awake and oriented x 4. Patient's VS WNL. /70, pulse 91. Patient calm and cooperative with staff, no longer verbalizing homicidal ideation. Patient remains on a 1:1 continuous observation level for safety per MD order.   
Patient became increasingly agitated, with angry outbursts. Patient endoresed HI threats towards sitter, and was unable to calm down. Called MD on call for IM medication order. MD ordered Ativan 2 mg IM and Haldol 5 mg IM. Administered IM medications per MD order. Patient tolerated IM medication well.  
Patient began to tremble and cry at table in day room. Patient given PRN Ativan 1 mg PO. Emotional support provided. Patient remains on a 1:1 continuous observation level for safety.   
Patient came up to this writer stating \" Cecile held on as long as I could, don't know of I can hold on until 9 for my meds\". Patient then stated that he does not feel like he is in the hospital stays \"I feel nothing\". Patient then began to tell this writer that I could not leave because no one else would be able to hold him down. Patient was sitting in chair hitting the chair saying at least I can't break this, breathing heavily. Patient was educated on relaxation techniques and given prn medication for anxiety.  
Patient continues to pace unit, and say he is homicidal. Stating he feels like he might hurt somebody. MD currently on unit. MD approved to give patient PRN ativan 2 mg IM. Medication given at 1516. Patient cooperative with medication administration.   
Patient has become increasingly agitated and combative since he awakened this morning.  Initially, the patient was very anxious, clinching his fists, and pacing back and forth.  Patient refused to eat his breakfast but was agreeable to take his morning medication. Patient began verbalizing homicidal thoughts and the desire to hurt people.  Patient repeatedly verbalized \"It's coming, I can't control  it\" while pacing towards staff.  Patient stated \"I know I am invading your personal space while attempting to make contact with this writer and additional staff.  Patient continued to yell loudly demanding staff to leave his room and stated he could not control his actions.  Patient continued to threaten staff with bodily harm and pace in his room.  This writer along with support staff attempted to de-escalate the patient and utilize calming techniques to help the patient refocus his thoughts and stop his impulsive behaviors; however, all efforts to de-escalate the patient were unsuccessful. Patient's mood is very labile with a full range of emotions.  Patient has displayed very loud angry outbursts to tearful fits explaining he cannot control himself the \"demons\" are taking over.  This writer informed the primary nurse to call the MD on-call for further assistance.    
Patient has been calm and cooperative he ate his breakfast and compliant with his meds and returned back to bed . This has been uneventful 
Patient is alert and oriented, uncooperative at times. Patient is pacing, walking back and forth from his room to the day room. Patient was in the day room sitting in the chair tapping his feet, appearing to cry, very agitated and anxious. Patient went to his room, upon walking to his room he stopped at another patients room door and started banging his head on the door. The patient was asked to not hit his head against the door and to come to his room to calm down using deep breathing technique.  While this writer went to call the doctor the patient came out to the day room while music group was going on and started placing the patient chair on the floor, patient was asked to  the chair by another nurse. Patient was escorted to his room and given PRN medication for anxiety. Patient doctor was paged and returned call. PER doctor order patient is to be put on a 1:1 for safety reasons and due to the patient being a danger to himself.    
Patient lying in bed asleep and snoring. This writer will continue to monitor patient for safety and behavioral changes.
Patient lying in bed on right side with eyes closed. Patient is snoring with even, unlabored respirations. No signs of distress noted or seen. Will continue to monitor patient for safety and behavioral changes.
Patient lying in bed with eyes closed calm and resting following IM medication administration. Staff will continue to monitor patient for safety and behavioral changes.   
Patient noted to have slept for approximately 7.75 hours.   
Patient noted with increased levels of anxiety. PRN Vistaril 50 mg given PO.   
Patient offered to participate in recreation therapy, but patient declined.
Patient on line-of-sight with sitter from [facility] utilizing close observation form. Patient in room resting with eyes closed.     0300    EPIC downtime from [id number]. See pt chart for paper charting.  
Patient received discharge instructions, verbalized understanding of follow up.  
Patient sitting in dayroom quiet and watching tv. Pt currently denies thoughts of SI/HI. When asked to rate his depression and anxiety, pt states “I feel nothing. I’m completely empty inside. I could care less about anything”. Will continue to monitor for safety and behavior changes.
Patient sitting quietly at table in dayroom. Transferring care of patient to MATEUS[facility].
Patient sitting quietly in dayroom watching tv. Transferred care of patient to floor T.
Patient slept 10 hours.
Patient slept 7 hours. 
Patient slept 9 hours.
Patient slept 9hrs. Will continue to monitor for safety and behavior.
Patient slept for 7 hours.
Patient tearful, and anxious. Patient given Vistaril 50 mg at 1402. Patient remains on a 1:1 observation level for safety.   
Patient was asked by smart therapist if he wanted to attend art therapy group. Patient declined. Will continue to monitor patient for safety and behavioral changes.
Patient was discharge but when staff took him downstairs he became tearful and very emotional. Discharge cancelled and patient being escorted to unit remaining on per1:1 Dr. Caldwell.  
Patient was notified by music therapist that music therapy group was about to start. Patient declined to go to music group.
Psychiatry note.    Afterward gone through all of the discharge work and patient had said he was fine and ready for discharge, patient apparently went up to the nurse at the desk and said he was going to go home and shoot himself.  He worked himself up into anxiety state and then said he could not be safe.  We did cancel the discharge and placement person one-to-one since he says he is not safe.  
Psychosocial Assessment     Admission Reason: increasing SI, anxiety, overwhelmed, helpless    C-SSRS Screening Completed - Current Suicide Risk:   [] No Risk  [] Low [] Moderate [x] High     Risk Factors: Secured weapons at home by wife, chronic hx of depression & anxiety,    Protective Factors: supportive spouse & family, regular outpt providers at [facility], asking for help, [employer]    After consideration of C-SSRS screening results, C-SSRS assessments, and this professional's assessment the patient's overall suicide risk assessed to be:  [] Low   [] Moderate   [x] High     [x] Discussed current suicide risk, protective and risk factors with treatment team to determine safety interventions as applicable.     Gender:  [x] Male [] Female [] Transgender  [] Other    Sexual Orientation:  [x] Heterosexual [] Homosexual [] Bisexual [] Other    Homicidal Ideation:  [] Past [] Present [x] Denies     Onset and Duration of Problem:    Current or Past Mental Health and/or Addictions Treatment (and response to treatment):  [x] Yes, When and Where....: long pt at ( [facility] ) [facility] with several providers over the years    [] No    Substance Use/Alcohol Use/Addiction (document name of substance, age of onset, how much and how often, route of use and date of last use):  [x] Reports [] Denies  Back in 80\"s used cocaine regularly for extended time period    History of Biomedical Complications Associated with Substance Use/Abuse:  [] Reports [x] Denies  Specify:    Family History of Mental Illness or Substance Use/Abuse:   [] Yes (Specify)  [] No    Trauma and Abuse History:  [] Reports [] Denies  Specify:      Legal History:  []  Yes (Specify)  [x] No currently on probation     Involvement:  [] Yes (Specify)  [x] No    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 
Pt appeared to have slept for 6.75 hours, thus far. He woke up briefly to toilet and for this writer to straighten out his linen for comfort. Pt is very pleasant upon approach. His gait was a little unsteady when he initially stood up to walk to the bathroom. He spoke about the “gout” in his knee giving him trouble sometimes, but stated that he is “alright”. Will continue to monitor LOS for safety and changes in behavior.
Pt appeared to have slept for 6.75 hrs. thus far (pleasant/respectful upon approach). He woke up x 1  to toilet. Pt snored lightly throughout the shift, but his lower limbs were observed to be quite restless at times. Will continue to monitor LOS for safety and changes in behavior.
Pt appeared to have slept for 7+ hrs.
Pt appeared to have slept for 8+ hrs during this shift without any disruption. Will monitor  
Pt appeared to have slept for 8+ without any disruption. Will monitor.  
Pt appears to have slept 9 hours; pt resting with eyes closed.   
Pt appreaed to have slept for 8 hrs during this shift. No concerns noted during the shift. Will monitor.  
Pt approaching desk stating, \"I don't have any feelings. I'm about to lose it.\" Pt visibly anxious, and seen rapidly tapping his feet. Pt received PRN Atarax. Will continue to monitor for effectiveness.  
Pt attended group 
Pt bit his left forearm. He said he’s feeling anxious. 
Pt c/o difficulty ambulating d/t pain in right knee that is related to gout.  Current regime of prn colchicine 0.6 mg does not appear to be managing his gout flare up.  PFM paged regarding this and placed order for indomethacin 50 mg, 3x daily prn for pain, pt given a dose at 0122    
Pt called and talked to his wife. Pt was more anxious after that call. Pt colored a little. Pt talked with staff about nothing feeling anything. 
Pt coming up to desk c/o anxiety 5/10. Pt received PRN Atarax. Will continue to monitor for effectiveness.  
Pt coming up to desk c/o anxiety 6/10. Pt received PRN Atarax. Will continue to monitor for effectiveness.  
Pt coming up to desk c/o anxiety 7/10. Pt received PRN Atarax. Will continue to monitor for effectiveness.  
Pt coming up to desk c/o anxiety 7/10. Pt received PRN Ativan. Will continue to monitor for effectiveness.  
Pt heard talking to a peer on the unit stating, \"Do they still keep you all for more than a week? If they try to let me go too soon, I will just say I need to go to [facility].\"   
Pt hitting his head lightly on the corner of the cabinet. Pt tapping his feet rapidly. PRN Atarax showed minimal effectiveness. Pt received PRN IM Ativan 2 mg. Will continue to monitor for effectiveness.  
Pt in day room appears to be anxious. When asked if they were anxious or stated, “I’m getting too much sleep, and my legs are burning. So now my legs are shaking.” Pt currently watching tv in pt milieu. Plan of care ongoing concurrent with 1:1 orders for safety.
Pt is pacing around his room. When he lays down he is hitting his back with his fist.
Pt loudly crying during  group. Pt received PRN Atarax. Pt encouraged to seek staff for emotional support as needed.   
Pt moved from 104-1 to 101-1; pt took all belongings and transferred to room with sitter.  
Pt observed to be anxious and agitated; pt pacing in room, holding head between hands and appeared as if pt was going to cry. Pt received scheduled Klonopin 1mg PO this PM but pt reported it was ineffective. Haldol 5 mg = 1 mL IM given per PRN order. Pt tolerated medication well; no further complaints at this time.    2201  Pt resting in bedroom, not in any distress. No further complaints at this time.    
Pt on line of sight with sitter from [facility] hospital; see paper chart for close observation form.  
Pt received PRN Ativan for anxiety 7/10. Pt pacing around room, and appears visibly anxious. Will continue to monitor for effectiveness.  
Pt received discharge instructions and emergency numbers. Prescriptions were faxed to pt's pharmacy by provider. Pt completed suicide safety plan with staff. All personal belongings returned to pt. Pt encouraged to follow-up with outpatient resources and to call with any questions or concerns. Staff escorted pt to Behavioral Medicine entrance to meet his wife.     Pt will resume his outpt tx at:  [facility]  [address] [place] [address]  # [phone]  Medication Management: Dr Caldwell on Thursday 10/3/24 at 10:30am  Therapist: Rishabh GAMEZ on Wednesday 10/9/24 at 2:15pm    
Pt refused group 
Pt slept approx. 7.5 hours.plan of care ongoing concurrent with 1:1 orders for H.I/S.I safety precautions. 
Pt slept approximately 6.5 hours. Pt used bathroom excessively through the night due to stomach pain. Pt currently supine on bed, asleep, snoring, with fall and rise of chest. Plan of care ongoing concurrent with 1:1 orders for safety.
Pt states, \"My tooth broke off in my mouth. I feel like I need to get the rest of it out.\" MD notified. Pt offered PRN Tylenol, and was encouraged to seek follow-up care with a dentist upon discharge. Will continue to monitor.  
Pt tried pushing this staff out of their room and shutting the door. Pt said they are feeling homicidal. 
Pt. is anxious, BP high  163/86, asymptomatic, bedtime scheduled medications given. Will continue to monitor for safety and recheck BP once Pt. calm down.  
Pt. is sleeping, respirations  easy and unlabored. Will recheck BP in am.  
Pt.slept 7 hrs. thus far.
RAHEL Note: Patient is still sleeping right now but staff will contigen to monitor 1 to 1.  
RIKKI Note: The above pt refused music group.
Received patient awake and alert, sitting in the day space participating in community group, reports “I feel empty, I just don’t feel anything.” Goals for today include attending group, showering, medication compliance and discharge to home. 1:1 constant observation status continued. 
Received patient in room awake and resting. Patient was compliant with vitals and all shift turn over processes. He says he had an “ok day” but there was some anxiety. He is pleasant upon approach and responds appropriately. Patient is free from falls self harm and the harm of others. Will continue to monitor.
Reviewed daily goals with the patient, discussed criterion for discharge, including ability to contract for safety, independently care for self, verbalizing an understanding of the purpose of a safety plan, including recognizing signs of a suicidal crisis, when to refer to it and how to utilize it to distract from thoughts of self-harm or seek help in an emergency. 1:1 constant observation status continued. Verbal report and care turned over to Melissa 
SOCIAL WORK GROUP THERAPY PROGRESS NOTE    Group Time:  10:15am       Group Topic:  Coping Skills    C D Issues    Group Participation:      Pt minimally involved during group discussion but remained attentive. Pt still dysphoric but not as anxious. Concentration, not very focused, some trouble following order of handout on self esteem. Did read parts.      Reviewed strategies to keep a \"Journal\" for moods, cognitions, behavior & outcome. He still minimizes ability to do so    Analyzing probability of feared event, potential consequences & how to manage. Pt admits he struggles staying in \"the moment\".      
SOCIAL WORK GROUP THERAPY PROGRESS NOTE    Group Time:  10:15am      Group Topic:  Coping Skills    C D Issues    Group Participation:      Pt minimal effort but remained more attentive, with better tracking of process going on in room. Still flat affect & depressed.    Members discussed the role of \"neurotransmitters\" and how they regulate different aspects of one's moods, cognitions & related behavior.  Pt struggled getting the concept, BUT tried to process.    There was presentation of basic \"CBT\" principles including list of typical cognitive distortions. Did comment \"I'm trying to be more positive\".    Also taught client about relationship between mood disorders & self medicating them.      
SOCIAL WORK GROUP THERAPY PROGRESS NOTE    Group Time:  10:15am      Group Topic:  Coping Skills    C D Issues    Group Participation:      Pt moderately involved during group discussion but remained a jumble of anxiety & depressive symptoms.    \"Seven Steps\" for taking responsibility for our Happiness was reviewed including commitment to change, self-care, setting limits, goal setting & letting go.    Reviewed strategies to keep a \"Journal\" for moods, cognitions, behavior & outcome.    Fears he will never learn to set boundaries / limits on his kids, \"I just can't say \"NO\".      
SOCIAL WORK GROUP THERAPY PROGRESS NOTE    Group Time:  10:15am      Group Topic:  Coping Skills    C D Issues    Group Participation:      Pt moderately involved during group discussion but remained attentive. Pt affect still flat with dysphoric mood.  Still commenting on \"not having emotions, I'm empty inside\".    Offered support for his \"Meltdown\" yesterday & d/c being cancelled. Pt claims \"I still don't understand it, all I did was become happy & cried when saw wife there to pick me up\". \"Then they did not let me go.    Pt claims he has no recollection of any statements indicating he was a danger to himself or others, during that interaction.    Emphasis of session was presentation of basic \"CBT\" principles including diagram of the process, as well as list of typical cognitive distortions.        Looked at the \"Process of setting Goals\", the value of a \"daily\" /  \"weekly\" schedule as tool to build self esteem, sharpen decision making skills and help define one's reality.    Discussion included the process of making \"Change\" by with an emphasis on strengths & weaknesses to support improving one's self esteem.                
SOCIAL WORK GROUP THERAPY PROGRESS NOTE    Group Time:  10:30am    Group Topic:  Coping Skills    Group Participation:     Pt was unavailable for group due to his continued acting aggressive, threatening staff & peers & refusing to attend.        
SOCIAL WORK GROUP THERAPY PROGRESS NOTE    Group Time:  10:30am    Group Topic:  Coping Skills    Group Participation:     Pt was unavailable for group due to resting in room and when politely reminded about session , he commented he wanted to continue to try to sleep. Still seems dysphoric but not as agitated.      
SUBJECTIVE:    Mr. Franz's chart was reviewed, he was discussed with nursing staff, and he was seen during rounds. The patient is reported to have complained of right leg pain. During rounds, the patient reports tolerating his medication regimen well. He reports having slept well. He denies SI today. He does report having spoken to his wife and sister and having \"cried like a baby\" after speaking to them. He reports tearfulness due to \"hearing their voices\" and being unable to be with them. The patient reports plans to attend [facility] for therapy after discharge to improve his improve his coping skills. He reports using deep breathing techniques currently to manage his anxiety.     OBJECTIVE  Medications  Current Facility-Administered Medications: QUEtiapine (SEROQUEL) tablet 100 mg, 100 mg, Oral, Nightly  clonazePAM (KLONOPIN) tablet 1 mg, 1 mg, Oral, TID  LORazepam (ATIVAN) tablet 1 mg, 1 mg, Oral, Q6H PRN  hydrOXYzine HCl (ATARAX) tablet 50 mg, 50 mg, Oral, Q6H PRN  amitriptyline (ELAVIL) tablet 100 mg, 100 mg, Oral, Nightly  colchicine (MITIGARE) capsule 1.2 mg, 1.2 mg, Oral, Once PRN **FOLLOWED BY** colchicine (MITIGARE) capsule 0.6 mg, 0.6 mg, Oral, BID PRN  acetaminophen (TYLENOL) tablet 650 mg, 650 mg, Oral, Q4H PRN  aluminum & magnesium hydroxide-simethicone (MAALOX) 200-200-20 MG/5ML suspension 30 mL, 30 mL, Oral, Q6H PRN  traZODone (DESYREL) tablet 50 mg, 50 mg, Oral, Nightly PRN  atorvastatin (LIPITOR) tablet 20 mg, 20 mg, Oral, Nightly  aspirin EC tablet 81 mg, 81 mg, Oral, Daily  Vitamin D (CHOLECALCIFEROL) tablet 2,000 Units, 2,000 Units, Oral, Daily  DULoxetine (CYMBALTA) extended release capsule 60 mg, 60 mg, Oral, BID  lamoTRIgine (LAMICTAL) tablet 150 mg, 150 mg, Oral, BID     Physical  BP (!) 107/56   Pulse 92   Temp 97.7 °F (36.5 °C) (Oral)   Resp 18   Ht 1.829 m (6')   Wt 88.5 kg (195 lb)   SpO2 95%   BMI 26.45 kg/m²     Mental Status Examination:    Level of 
SW Contact:      Collateral Contact:     Again spoke to pt's wife # [phone] and offered her support as we processed yesterday's FT. She still frustrated, hurt & disappointed with pt's limited progress. She especially concerned about his comments his spirituality having been taken over by the devil.     Also there was voice mail yesterday that he didn't want her to visit anymore for now. She also struggles with his comments about his stomach is \"void of feelings\" especially when positive ones are discussed.    Reminded her Dr & tx team questioned if visits are not of therapeutic value & asked if she could support this for now. Again she referenced comment by pt above as his choice for her not to come.    Other possible location for transitional place to stay may be pt's sister Audra locally & wife will contact her & see if she may participate at that level.    Wife will update us, since pt no release signed to speak to sister.    Dr & tx team updated.    
SW Contact:      Collateral Contact: pt's wife & writer reviewed limited progress pt has been making with his tx plan, Although he has been less irritable with not as much crying during their phone time, she continues to set limits on negative topics as well as plenty of support.    Pt themes with wife are still centered around some religiosity and devils in his body.    She did encourage him to reach out & speak with sister & son so they know he's ok & can offer there support.     Pt Contact: with writer limited as he was still irritable, didn't want to speak, and waiting for his Dr. Support & encouragement offered.    Dr & tx team updated.      
SW Contact:      Collateral Contact: spoke to pt's wife # [phone] & she confirmed she still planned on attending today's 1PM session with pt & writer, despite pt telling staff early this am he did not want to meet with her.    Wife confirmed with me they spoke late am and she told him there is a need for him to get back on track and to get home she would appreciate more effort on his part to participate, not avoid & be less negativistic. \"His crying on phone is not productive\", so hopefully we can improve that in person.    Dr & tx team updated session still happening.  
SW Contact:      Collateral Contact: with spouse Yanely # [phone], who had good contact last night again encouraging pt to focus more on following daily schedule if even doesn't participate. They also looked at what \"returning\" to home would look like & importance of structure home also. Still no interest to return there.    Wife & I also looked at some progress with tx plan goals. Emphasis remains on \"Safety Plan\", which she tries to help him with.    She continues to be available for future  FT.    Pt was sleeping in room when writer went to see him.     Dr & tx team updated.                    
SW Contact:      Collateral Contact: writer shared  tx plan goals & progress to date to:    Manoj SMALLWOOD/[facility] planner  Pati Ursula  # [phone]    She new to case & asked writer to share with pt she will be following his case once he returns to [facility] for outpt services.     Dr & tx team updated      
SW Contact:      Family Session: 1st part with spouse only about x10 minutes on how she's worked hard past x10 days not reinforcing pt's helplessness, redirecting him to focus on present and try redirect him briefly to use his CBT ED he's learned. This has made their interactions more limited, to the point with her reminding pt she cannot communicate much more with him not making decision to come home. Limited to no crying happened during calls.    When we all met pt inially hugged her, thankful she came, was tearful with limited apology for all he's put her through. Gradually writer & spouse.commented about pt progress past x48 hrs & looking at potential to go home soon. Pt was not resistant but started commenting couple times he needed time to get everything out of his head. Spouse & writer countered with upside of returning to home environment, getting his life back would help prevent him from overloading on these thoughts. One time he commented to wife he did 'hope I wouldn't hurt you\", if he returned home. Safety plan reviewed that would help maintain pt & spouse safety, including tentative outpt appointment starting in x5 days.    After x10 more minutes with minimal resistance, pt's Dr joined session & writer left them for 2nd part of session.    Dr & tx team updated.  
SW Contact:      Family Session: Held with pt & spouse. He initially surprised she attended.  From outset pt started commenting happy to see her & started holding her hand across table.    She immediately made him aware she here to remind him he has a couple choices. He works hard in tx to come Home, possible more extensive tx somewhere else or potential senior living if he continues to threaten her or staff or even hurts anyone. Also wants him to sign a POA so that the household can be run since he not participating, especially if a financial emergency may arise.    Pt response was mixed with some mood lability, got up to leave & condemning self for being a bad partner, even tearfully bringing up cocaine episode yrs ago. Her response brief \"we let that go & you did that x30+ years ago before we were \".    As writer redirected both back to handout on CBT positive strategies list pt immediately began pulling hands away from spouse & again commented tearfully \"void of feelings, with the devil having taken over my soul\", \" I probably need to go to longterm like the other murderers I've met here\".    Pt kept referring \"hopeless & no future\", with wife response about unable to control that, \" so you need to work that through with your Drs\".    Pt getting up to leave again & both said there goodbye's.    Dr & tx team updated.  
SW Contact:      Pt Contact:    Axis I: Major depressive disorder recurrent without psychotic symptoms.  Generalized anxiety disorder with panic-like symptoms.  Axis II: Noncontributory.  Axis III: Status post remote craniotomy for blood clot extraction.  (1963).  History of nonalcoholic liver disease currently with normal hepatic function tests.  History of hepatitis C    Pt was seen earlier in shift & after lunch & both times resting in room & in deep sleep, so was not disturbed.    Dr & tx team update  
SW Contact:      Pt Contact:  Pt was found resting in room, commenting feeling not wanting to be around others for now, \"need to be quiet\". He identified today being more anxious, which causes him to struggle with concentration and then \"not know what to do\". He gave specific example of group a short while ago, and being unable to follow the directions from therapist ( despite extra help from her),   I just couldn't do it, so watched\". Pt unable to remember any negative self statements triggering his lack of focus & feeling overwhelmed. Brief reminder of Mindfulness & CBT concepts offered by writer.    Pt had recent contact with spouse. He felt was short, basic interaction about \"how he's doing\". He didn't share much more & really avoided confirming whether still thoughts of SI or HI, by changing the subject, not indicating a want to even return there.    Support offered, reminded him of tx plan goal to continue to not isolate & keep trying to attend groups. Also he maintain contact with spouse.    Another journal was given to help him track thoughts, feelings & behavior.    Dr & tx team updated.  
SW Contact:      Pt Contact:  pt still lethargic, & was sleeping after recent PRN this AM, despite staff support & reminders of prior positive efforts he's made. Pt continues to minimize any efforts past or present & only wants to focus on his anxiety with continued avoidance on any discussions of strategies ( example CBT ) that could help him better manage his moods.    After lunch was up, seeing writer very tearful, negativistic but lots of support offered. Writer continued to remind him of therapeutic value of attending groups and activities to help give him more cognitive internal focus.    Also encouraged him to maybe process they passing of his dad last year who he had cared for extensively. Pt minimized the potential for any grief related issues that we could help him process. \"It was what it was & I did what I did to help\". Reminded pt grief process can take various lengths of time & different for everyone, and that his Dr corriganr & entire tx team staff available to assist however we can. Pt still tearful with no response.    Dr & tx team updated.  
SW Contact:      Pt Contact: Couple contacts today 1st around lunch, were mostly writer supporting pt having observed him attending & at some level making effort to participate in the activities. Reminded him of the positives of task completion, staying busy &  providing an opportunity to practice skills that can help him at home to better manage moods & feel so overwhelmed.     Pt still no effort with trying to make any kind of simple journal type entry to process what he feels at time. Immediately pt negativistic explaining  'THAT STUFF DOESN'T WORK\" despite admitting he's never tried it. Quick review of CBT / DBT structure & purpose shared. Again \"That stuffs not for me\"    Later in shift around 2 pm after a lot of chaos & acting out by peer on unit, pt was found by writer slowly walking down corona tearful & when support offered he began to start crying & mumbling (similar to prior meltdowns past week)    This gradually gained momentum and within about x10 minutes another hysterical crying episode was occurring, despite a lot of support, snack & redirection from writer, RN, music therapist. Theme was about missing wife, all the problems in his head & stomach, never going home \"I'm just not safe or good anymore, [facility], but want home with wife\".    He was assisted to phone to try calling her, still crying heavily , no answer x2, kept crying & observed by writer & nurse to eventually go join music therapists group in day area.    ADDENDUM  above episode lasted about x30 minutes    Dr & tx team updated.  
SW Contact:      Pt Contact: although affect still blunted some, he doesn't seem as dysphoric. He still struggles with support & praise for any positives he exhibits. Example being his effort to not only attend but getting back to try participate in these sessions.    Another positive noted was we briefly discussed contact with spouse, goal setting & FT tomorrow at NOON, without him having a meltdown.    Pt agreed to call wife & set that up.    Dr & tx team updated.      
SW Contact:      Pt Contact: couple attempts both am and pm to meet with writer & continue to educate him more specifically about CBT & Mindfulness Principles, which he refused to do.    He spoke of his anxiety going through the roof & that's why pacing the unit so much today. Still no effort on \"writing\" Daily Schedule, only referring to cleaning / painting the garage & doesn't see writing helpful still despite most of staff clinical / nursing showing him how to put one together. He even commented meeting with his Dr \"no help\", \"he needs to let me go home\".    Collateral: message left for Dr Turner at [facility], to establish if he is willing to try again to do an INITIAL outpt session, after pt has had x2 D/C's cancelled by Hospital Dr. Beckham & tx team updared.  
SW Contact:      Pt Contact: during our 1:1 pt was somewhat brighter not as dysphoric but still anxious. Theme was letting go of past issues after he disclosed he's been carrying thoughts about some mutual sexual encounters about x14 yrs old that he feels guilty about & keeps popping up in his head.     Processed purpose of disclosure, \"need to clear my conscious\" also wanting to be honest & tell his wife. Looked at pros & cons of doing  such & hopes to tell her if visit today can be arranged. Writer encouraged pt to take another day to review if this serves positive purpose & he stayed firm he needs to do this today.    Writer worked with clinical supervisor & unit charge nurse on potential logistics to make this happen, contingent on staff available & status of activity on his unit.     & tx team updated.  
SW Contact:      Pt Contact: efforts to speak to pt this am were met with threats to harm writer if I continued to stay in his room. He kept pacing & commented not needing a \"watch person\" all day long. Reminded him I would remain available to talk until around 1pm & that his Dr would be in building making rounds around 3pm. Pr response was \"keep him away from me also, you know why\".    8:20pm  Collateral with pt's wife # [phone]. We processed pt's limited progress with tx plan, medication adjustments, his irritability with continued threats to staff & peers, as well as minimal participation in groups & activities.    She confirmed they have brief calls am & pm with her setting limits about any talk about harming self or others, by redirecting him & if that doesn't work , he knows she will hang up on him & \"several times I've had to do that\".    She doesn't want to see her yet, but she questioned that his sister & son are available to visit / FT if tx team thinks would help.    Last nights call ended with pt telling her, \" I will end up with dry sand in my mouth, with my body on fire since the devil is inside me\". This was scary to wife because of its intensity, but also reminded her he mostly stopped his evening prayers before bed, which he had done consistently most of his life.    Again reassured wife, staff will continue to keep spouse safe.    Dr & tx team updated.  
SW Contact:      Pt Contact: he attended Tx team Meeting to day & was initially with flat affect & depressed mood.all tried to help him process his \"meltdown\" experienced prior to his d/c yesterday when became extremely suicidal. He expressed fears of not managing when he got home, despite belief that wife is main / only support or time sees as being productive.    Despite numerous suggestions by all about need for daily schedule, activities to fill it , trying to journal & host of other options to occupy his time more productively, but pt mostly minimized their potential help.    Eventually by end pt seemed more willing to consider strategies offered to better manage his moods. He asked for visitation today with his wife.    Writer made arrangement for wife visit 1:30pm to 2pm in unit conference room supervised by his nursing staff 1:1.    Writer brought spouse on unit. Heading upstairs she was updated on Tx Plan meeting today. She confirmed she & family have made similar suggestions but pt remains resistant.     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 He was irritated & has refused to consider doing so.    Pt & wife were handed over to 1:1 staff for visit.      Tentative d/c plan:    Pt will return to outpt services with:  [facility]  [address] [place] [address]  # [phone]  Therapist: Dr Luis Felipe Turner  on 8/15/24 at 3:45pm  Medication Management: Dr Caldwell on 8/20/24 at 3:45pm     & tx team updated.  
SW Contact:      Pt Contact: he attended tx team meeting this am & presented again with somewhat flat affect & dysphoric mood. As we started processing how his day was going & tx plan goals, Pt started making comment that this am started with \"my room has felt that the corner was getting bigger\". Further discussion had pt say this not happen before & not seem related to his anxiety.    Family has been limited contact with spouse, sister & son, although they talked about he being unable to celebrate  dad's birthday 24 ( who passed away May 2023) & they'll postpone until pt gets back home.    Team along with pt explored \"safety\", how to maintain, as we again need to have him demonstrate more effort in his tx, due to extensive member's efforts to get him to complete tasks.    Priority now is written \"Safety\" plan 1st, followed by \"daily schedule ( work sheet to do so will be given again, as in past ) and review with Dr & tx team soon.    Pt understands staff available to assist.     Dr & tx team updated.  
SW Contact:      Pt Contact: he was starting to again struggle more with his helplessness & hopelessness, somewhat obsessing about his depression will never change & no matter which way he turns there's a roadblock to getting healthy anytime soon.    When tries to give specifics about \"his\" negative self statements it's mostly about his spouse, family members & other things but nothing about him. During discussion his concentration poor, some repetitiveness with constant Doom & Gloom\" theme.    Again reviewed some CBT principles.    Started talking again about needing longer hospitalization and hoping he gets sent to [facility].    We discussed if wants \"wife\" to visit today. He responded ' no she's watching the grandkids. Reminded him he needed to let me know by Noon, as I would be available to coordinate that.    Charge nurse confirmed at 1pm, NO request by pt for visitation with his spouse.     & tx team updated.    
SW Contact:      Pt Contact: pt attended Treatment Team and appeared flat affect with dysphoric mood, as well as concerns over his unpredictable anxiety & physical issues it triggers. Still seems unmotivated, as well as lacking insight into the clinical aspects of his depression / anxiety.    He showed little change in affect even when discussing his moods & behavior with his Dr who returned from being out past x10 days. Pt briefly recalled during that time becoming verbally aggressive, even threatening to harm staff, not caring about potential consequences if he hurt someone, including even potential harm to wife. They maintain contact but she remains firm he cannot return home if continues to threaten harm to her or anyone else.    Pt felt a little better after med review with his Dr, but minimal response  or even basic understanding of team members reminders on basic CBT & Mindfulness principles.    He still presents as somewhat helpless but didn't cry or be tearful compared to his almost daily \"meltdowns\" & shaking from being overwhelmed by his anxiety.    Pt did verbalize will try to make more effort to attend sessions & spend less time in his room.    Dr & tx team updated.   
SW Contact:      Pt Contact: pt hasn't done much with journaling, and not really able to process the few comments he made.    Offered him support for more consistency with group attendance & staying longer periods of time.    Collateral Contact:  Spoke with spouse Yanely # [phone], who had another positive contact last night again encouraging pt to focus more on following daily schedule. I confirmed for her that 50 % of what he shared was accurate. She has avoided \"safety\" discussion or questions.    Encouraged wife to encourage continued group attendance & trying to be more open with his thoughts & feelings.     Dr & tx team updated.  
SW Contact:      Pt Contact: pt less dysphoric, but still struggles with accepting basic CBT strategies can make a difference in his thinking. Limited insight continues understanding the correlation between his thoughts & emotions, despite these concepts presented in so many formats (Art, Music, Activity therapies as well as SW group). Will continue to present same.    Tentative d/c plan includes return to [facility] for medication management and IT.    Dr & tx team updated.  
SW Contact:      Pt Contact: spoke to pt who agrred he's made progress in tx especially last several days.    He's agreed to cooperate with the outpt tx plan as follows;    Pt will resume his outpt tx at:  [place] [address] [place] [address]  # [phone]  Medication Management: Dr Caldwell on Thursday 10/3/24 at 10:30am  Therapist: Rishabh GAMEZ on Wednesday 10/9/24 at 2:15pm    Wife will pick him up about 1pm today  
SW Contact:      Pt Contact: this am he's still struggling with anxiety, although not as intense so far today, but commented to writer still depressed but unable to verbalize what that means to him.    Still time isolating in room but some effort to attend a couple groups but participation seems marginal. Emphasis on him complying with \"daily\" schedule continues, with lots of support from staff.    Everyone keeps reminding him that consistency in tx program & creating definite \"safety\" plan, have impact on Dr's decision to d/c him.    Collateral Contact: writer spoke to his wife Yanely # [phone], who processed with writer their contacts, which have been limited, since our 9/13/24 FT. His Dr proposed referral to [facility], with pt not wanting to go there & discussed this with his wife. She continues to encourage him attendance & participation in groups & activities, to prevent a [facility] referral.     Reviewed updated tx goals with spouse.    Dr & tx team updated.      
SW Contact:      Pt Contact: today's interaction remained centered on tx plan goal of pt gaining more insight into CBT & learning to catch his negative self statements & how to substitute more positive ones.     Pt continues to comment \" I have no emotions\" as he points to his stomach & chest commenting  \"see there, empty inside, nothing\". Writer helped him process, with his thinking still rather concrete, even when some metaphors used.    Also mentioned correlation with \"mindfulness\" principles, makes for great combo to better manage his moods.    Affect flat during majority of interaction.    Dr & tx team updated.    
SW Contact:      Pt Contacts; during initial interaction around 10:30am pt was a little dysphoric while resting in room. Reminded him my CBT group was ready to start encouraged him again to join. He did comment was missing his wife so much especially hasn't had visit in days. Offered him opportunity to do so & writer gave him 1:30pm best time, even couple minutes given for 3 of us to meet. He agreed would call & arrange it then attend group session.    Pt seen again about 12:40pm while giving thumbs up \"she's coming 1:30pm as we planned\". As writer commented \"great job\", pt immediately started crying & threatening to punch & \"hurt her bad\", eventually swinging fists in air, then lightly punching chair, standing up \" you need to take me to alf\", followed by angrily walking towards writer with fists up saying \"get out of my way, you don't know what I'm capable of\". He then started pacing corona, with writer getting between him &  & her cart sobbing loudly entire time as he continued pacing. Eventually writer redirected him to interview room, where charge nurse commented he had already received PRN, (before I walked onto unit) for his anxiety. Nurse stayed with him, writer called wife who was just arriving, & processed events with her downstairs. Obviously she upset, having no explanation or theories on trigger. Today he cried a little when set meeting, prior contacts to her this week, \" he mostly crying & mumbling\".     Writer gave her handout on managing depression / anxiety we have used with him, which she'll review & bring to next scheduled FT session & visit Monday 8/26/24 at 1pm pt was not yet told of this meeting.     & Tx Team updated  
SW Contact:      Pt contact: today's interaction limited. He was observed sleeping in room in am and writer found later sleeping in day area after lunch.     In am reportedly pt became angry his glasses were missing but later found.    Nursing staff shared he has been isolating more & if attended a group, he was minimally involved.     & tx team updated  
Spiritual Health Assessment/Progress Note  [facility]    Spiritual/Emotional Needs,  ,  , Follow up [facility]    Name: Hernandez Franz MRN: [id number]    Age: 68 y.o.     Sex: male   Language: English   Latter-day: Synagogue   Major depressive disorder without psychotic features     Date: 8/25/2024            Total Time Calculated: 40 min              Spiritual Assessment began in [facility] ADULT 2        Referral/Consult From: Multi-disciplinary team   Encounter Overview/Reason: Spiritual/Emotional Needs  Service Provided For: Patient    Kinza, Belief, Meaning:   Patient has beliefs or practices that help with coping during difficult times  Family/Friends No family/friends present      Importance and Influence:  Patient has spiritual/personal beliefs that influence decisions regarding their health  Family/Friends no family/friends present    Community:  Patient expresses feelings of isolation: disconnected from family/friends and feeling no one understands  Family/Friends no family/friends present    Assessment and Plan of Care:     Patient Interventions include: Facilitated expression of thoughts and feelings and Affirmed coping skills/support systems  Family/Friends Interventions include: no family/friends present    Patient Plan of Care: Spiritual Care available upon further referral  Family/Friends Plan of Care: Spiritual Care available upon further referral    Electronically signed by JOYA Guevara on 8/25/2024 at 11:23 AM    
Spiritual Health Assessment/Progress Note  [facility]    Behavioral Health,  ,  , [facility]    Name: Hernandez Franz MRN: [id number]    Age: 68 y.o.     Sex: male   Language: English   Nondenominational: Mormonism   Major depressive disorder without psychotic features     Date: 8/4/2024            Total Time Calculated: 30 min              Spiritual Assessment began in [facility] ADULT 2 ANNEX        Referral/Consult From: Multi-disciplinary team   Encounter Overview/Reason: Behavioral Health  Service Provided For: Patient    Kinza, Belief, Meaning:   Patient is connected with a kinza tradition or spiritual practice and has beliefs or practices that help with coping during difficult times  Family/Friends No family/friends present      Importance and Influence:  Patient has spiritual/personal beliefs that influence decisions regarding their health  Family/Friends no family/friends present    Community:  Patient is connected with a spiritual community  Family/Friends no family/friends present    Assessment and Plan of Care:     Patient Interventions include: Explored spiritual coping/struggle/distress and theological reflection, Affirmed coping skills/support systems, and Provided sacramental/Sikhism ritual  Family/Friends Interventions include: no family/friends present    Patient Plan of Care: Spiritual Care available upon further referral  Family/Friends Plan of Care: Spiritual Care available upon further referral    Electronically signed by JOYA Guevara on 8/4/2024 at 11:28 AM    
Staff informed charge nurse and he was able to let her know himself at this time during the conversation he just had with the charge nurse . 
Staff met with patient on this date to complete treatment team.    At the start of treatment team Pt reported he was feeling anxious again his affect was flat.      Pt. Has been attending groups today.  He also has been asking about a visit with his wife.  Staff informed Pt.  If he continues to make progress as he has been we will consider a visit on Thursday 9/26/24 or Friday 9/27/24.      1:1/ line of sight has been discontinued as of 9/25/24.    Giselle Scales LCSW  
The above it has been awake in his bed shaking his legs he has been quiet and has not been disruptive. He then proceeded to sit up on the side of the bed and verbose “ I am   It gonna shake my legs any more “-“I know you have heard my stories that I have been running my mouth about. -“I am fighting w satan” .
The above it sent his wedding band home with his wife at this time. His sheet was updated at this time . He was ble to sign for it and also staff signed for it as well. The ring was given to his wife. 
The above it verbalized while is lying awake in his bed he verbalized “ma’am please tell my wife not to come”. He was informed his wife was not coming today he then verbalized “good”- “ can put out her on the list not to visit “ . He also verbalized I ask you please cause I don’t want to hurt her and I do t know what’s gonna happens to her. Staff will inform the charge nurse if this update at this time . He is still laying awake in his bed alert and oriented . 
The above pt has been in his room pacing, causing, focused on his visit with his wife verbalizing “you don’t understand not I am no gonna be able to see my wife”! He has Adrian re-directed several times to utilize effective coping skills he is able to be cooperative when re-directed he continue to focus on being disruptive while group is in session. He was re-directed and returned back to sitting on his bed at this time. He is currently resting on the bed . 
The above pt has refused majority of the group sessions. He has been in his room quiet while awake. He is currently tearful and verbalized “ I can’t get no tears”. He was re-directed and he was able to stay on task me on the conversation. He at this time is reflecting on work during this conversation. He is still engaging its staff at this time .
The above pt is alert and cooperative however he is still focused on satan, past history, and hurting his wife. He verbalized “I want to hurt my wife”. - “I know it’s satan and I know he is coming” Staff encourage let him several times during this conversation while setting  limits and boundaries during this conversation he continue to have flight of ideas by verbalizing “I ounce had anal sex with a women on my job”- “I have been homicidal in the past-“- I don’t want to hurt my wife but Satan is on my shoulder”.  “ I know I would be somebody bitch in [facility] cause of my size”. I use to do I.V drugs in 1984”. He continue to focus in his stressors however he sill continue to deflect his problems and stressors and on everyone else during the conversation. He had to kept being reminded of this during the conversation however he still has very poor insight when it comes to this at this time. 
The above pt is currently meeting with the doctor at this time . 
The above pt is resting upon hand off chest rise and falling the above pt does not appear to be in distress
The patient slept 10 hours.
The patient slept nine (9) hours.  
The patient slept ten (10) hours.  
Therapist met with patient individually with 1:1 staff present. Pt continues to endorse a high amount of negative thinking and lack of feelings. For majority of session, pt's legs were not shaking, and pt would point this out. Towards end of session, pt's legs were shaking and then pt stopped shaking legs. Pt frequently discussed how he can't do things and how he should be feeling a certain way. Pt was also focused at times on going to long term. Pt discussed his wife at times as well. Therapist utilized a combination of CBT and ACT approaches involving techniques including recognizing cognitive distortions (catastrophizing, should statements) and cognitive defusion (\"I'm having the thought that\"), and values-based action to assist pt with negative thinking and making healthy/positive choices. Therapist also encouraged deep breathing and checking in with breath, as pt acknowledged deep breathing is helpful for him. Therapist offered to get leisure sheets for patient for the weekend, which pt did not seem interested in at this time. Pt mentioned having a [place] coloring sheet which he looked for within his folder. Pt discussed how he liked utilizing the ocean drum in prior music therapy groups.    Jennyfer Muñoz, LPC, MT-BC  
This therapist met with patient individually to check-in regarding assignment from yesterday's treatment team, which was to create a daily schedule and a Safety Plan. Pt expressed confusion as to what he was supposed to do in terms of the assignment from treatment team and difficulty remembering what the assignment was. Pt shared the start of a daily schedule which had one item of walking the dog written on it. Therapist provided praise for starting work on this.     Pt seemed to not understand what a Safety Plan was. Therapist explained the purpose of a Safety Plan and shared with him a print-out of a Safety Plan that could be filled in. Therapist explained each category on the Safety Plan document. Pt began to become overwhelmed and stated he could not do this and wanted to quit. Therapist assisted pt with starting to edouard in the Safety Plan. Pt identified one warning sign that a crisis is developing is that he feels something in his stomach. Pt identified one coping skill he can utilize is walk his dog. Pt was negative about himself regarding his writing and spelling and was starting to become overwhelmed. Therapist assisted with calming before ending the session. Lunch arrived, and session ended with therapist encouraging pt to enjoy his lunch and to continue to work on his Safety Plan document.    Jennyfer Muñoz, JOVANNA, MT-BC  
VARGHESE Note: The above pt returned back from treatment team . He is currently talking to his wife in the phone at this time . He is not experienced any falls while ambulating back n forth . 
VARGHESE. Note: The above it is attending art therapy group at this time he appears to be irking very diligently on the talk at hand.
We again attempted to discharge patient with him saying that he would be okay to leave.  I ran into the wife in the lobby as she was coming in to pick him up and she was saying that he was again quite negative on the telephone after first saying that he was going to be okay.  By the time he got down to the lobby he was having a crying hysterical episode saying that he could not be safe and he had no idea what he was going to do nor how he was going to take care of himself.  He continues to exhibit extreme passive dependent traits and continues to focus on how others have to keep him safe and take care of him.  A great deal of energy has been expanded in trying to help him with treatment plans and alternate resources other than having others take care of him.  We did have to cancel the discharge as he could not contract for safety and was not showing that he could take care of himself and he was again returned up to the unit on a close watch for suicidal risk.  
While on the phone with spouse, patient became very emotional and tearful.  Patient ended call and returned to room and began crying. Will continue to monitor patient for safety and behavioral changes.
ZULAY MOTA Note: The above pt has been out in the common area and has been visible banging his head on the chair. He was re-directed about his behavior at this time and continue to verbalize “I don’t feel nothing!” He appeared to have good eye contact with neutral voice tone during this conversations. He is still sitting out in the common area he does not have any injuries during this incident. He is sitting quiet and cooperative at this time . He was given medication at this time which he tolerated well. He is currently still sitting out in the common area calm cooperative and quiet at this time. 
ZULAYH.DEX Note: Patient is up watching tv this morning.   
  Psychiatric Diagnoses:   Patient Active Problem List   Diagnosis    Fatty liver disease, nonalcoholic    Diverticulitis    Prediabetes    Diverticulosis of large intestine without hemorrhage    Cervical radiculopathy    Herniated disc, cervical    Elevated fasting glucose    Hep C w/o coma, chronic (HCC)    Anxiety and depression    Hyperlipidemia    Hydrocele, left    Gout    Erectile dysfunction    Major depressive disorder without psychotic features       Medical Diagnoses: As noted above    Psychosocial and contextual factors: As noted in H and P     Level of impairment/disability:     Hernandez Franz is a 68 y.o. who is currently admitted for behavioral stabilization.      1.  Continue current medications  2.  Reviewed instructions, risks, benefits and side effects of medications  3.  Disposition/Discharge Date: self-care/home, when clinically appropriate     Dean Smith MD  [facility]  Psychiatry  
  Psychiatric Diagnoses:   Patient Active Problem List   Diagnosis    Fatty liver disease, nonalcoholic    Diverticulitis    Prediabetes    Diverticulosis of large intestine without hemorrhage    Cervical radiculopathy    Herniated disc, cervical    Elevated fasting glucose    Hep C w/o coma, chronic (HCC)    Anxiety and depression    Hyperlipidemia    Hydrocele, left    Gout    Erectile dysfunction    Major depressive disorder without psychotic features       Medical Diagnoses: See August 19, 2024 progress note.      1.  Medications will continue the same.  2.  Reviewed instructions, risks, benefits and side effects of medications  3.  Disposition/Discharge Date: self-care/home, TBD.  Care being transferred back to Dr. Caldwell.  Patient in agreement.    ZEINA NUNEZ MD. [facility]  Psychiatry  25 minutes of which 10 minutes were face-to-face with the patient, 15 minutes discussion with the staff and reviewing treatment plan and medical records.  
(Oral)   Resp 18   Ht 1.829 m (6')   Wt 88.5 kg (195 lb)   SpO2 99%   BMI 26.45 kg/m²     Mental Status Examination:    Level of consciousness:  Alert  Appearance: Appropriately groomed; good eye contact  Behavior/Motor: No abnormal movements observed; noted to sob loudly; able to be distracted and his sobbing stopped; no visible tears noted; noted to eat a snack and drink juice throughout the interview  Attitude toward examiner:  Cooperative; readily engages in the interview; talkative  Speech:  Normal in rate, volume and tone  Mood:  Stated mood is, \"Nothing.\" \"I just don't feel anything.\"    Affect:  Mildly constricted   Thought processes:  Logical; goal-directed  Thought content: Endorses passive SI; denies HI; denies psychosis; does not appear to respond to internal stimuli  Insight:  Fair at best  Judgment:  Appears impaired    ASSESSMENT    Major depressive disorder without psychotic features; SELMA    PLAN  MDD-Continue Elavil 100mg qPM, Cymbalta 60mg bid, Lamictal 150mg bid, and Seroquel 100mg qPM. Monitor for exaggerated symptoms as there is concern due to tearless sobbing that the patient easily stopped and readily engaged in the interview while snacking.   SELMA-Continue Klonopin 1mg tid.   Continue for physical health.   Continue hospitalization for now.     
Appropriately groomed; seen sitting on the side of his bed; fair eye contact  Behavior/Motor: No abnormal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

## 2024-09-30 NOTE — PLAN OF CARE
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 Monitor for social isolation  Outcome: Not Progressing     Problem: Behavior  Goal: Pt/Family maintain appropriate behavior and adhere to behavioral management agreement, if implemented  Description: INTERVENTIONS:  1. Assess patient/family's coping skills and  non-compliant behavior (including use of illegal substances)  2. Notify security of behavior or suspected illegal substances which indicate the need for search of the family and/or belongings  3. Encourage verbalization of thoughts and concerns in a socially appropriate manner  4. Utilize positive, consistent limit setting strategies supporting safety of patient, staff and others  5. Encourage participation in the decision making process about the behavioral management agreement  6. If a visitor's behavior poses a threat to safety call refer to organization policy.  7. Initiate consult with , Psychosocial CNS, Spiritual Care as appropriate  Outcome: Progressing     Problem: Anxiety  Goal: Will report anxiety at manageable levels  Description: INTERVENTIONS:  1. Administer medication as ordered  2. Teach and rehearse alternative coping skills  3. Provide emotional support with 1:1 interaction with staff  9/9/2024 0824 by Heather Escalona RN  Outcome: Not Progressing  9/8/2024 2023 by Kellen Moffett RN  Flowsheets (Taken 8/3/2024 2127 by Jovan Mahan RN)  Will report anxiety at manageable levels:   Administer medication as ordered   Teach and rehearse alternative coping skills     Problem: Depression  Goal: Will be euthymic at discharge  Description: INTERVENTIONS:  1. Administer medication as ordered  2. Provide emotional support via 1:1 interaction with staff  3. Encourage involvement in 
  Problem: Depression  Goal: Will be euthymic at discharge  Description: INTERVENTIONS:  1. Administer medication as ordered  2. Provide emotional support via 1:1 interaction with staff  3. Encourage involvement in milieu/groups/activities  4. Monitor for social isolation  Outcome: Not Progressing     Problem: Behavior  Goal: Pt/Family maintain appropriate behavior and adhere to behavioral management agreement, if implemented  Description: INTERVENTIONS:  1. Assess patient/family's coping skills and  non-compliant behavior (including use of illegal substances)  2. Notify security of behavior or suspected illegal substances which indicate the need for search of the family and/or belongings  3. Encourage verbalization of thoughts and concerns in a socially appropriate manner  4. Utilize positive, consistent limit setting strategies supporting safety of patient, staff and others  5. Encourage participation in the decision making process about the behavioral management agreement  6. If a visitor's behavior poses a threat to safety call refer to organization policy.  7. Initiate consult with , Psychosocial CNS, Spiritual Care as appropriate  Outcome: Progressing     Problem: Anxiety  Goal: Will report anxiety at manageable levels  Description: INTERVENTIONS:  1. Administer medication as ordered  2. Teach and rehearse alternative coping skills  3. Provide emotional support with 1:1 interaction with staff  Outcome: Progressing     Problem: Depression  Goal: Will be euthymic at discharge  Description: INTERVENTIONS:  1. Administer medication as ordered  2. Provide emotional support via 1:1 interaction with staff  3. Encourage involvement in milieu/groups/activities  4. Monitor for social isolation  Outcome: Not Progressing   Patient demonstrates a flat affect, depressed mood. Patient remains on a LOS continuous observation level. Patient is medication compliant and cooperative with staff.  Patient denies 
  Problem: Depression  Goal: Will be euthymic at discharge  Description: INTERVENTIONS:  1. Administer medication as ordered  2. Provide emotional support via 1:1 interaction with staff  3. Encourage involvement in milieu/groups/activities  4. Monitor for social isolation  Outcome: Progressing     Problem: Angelica  Goal: Will exhibit normal sleep and speech and no impulsivity  Description: INTERVENTIONS:  1. Administer medication as ordered  2. Set limits on impulsive behavior  3. Make attempts to decrease external stimuli as possible  Outcome: Progressing     Problem: Behavior  Goal: Pt/Family maintain appropriate behavior and adhere to behavioral management agreement, if implemented  Description: INTERVENTIONS:  1. Assess patient/family's coping skills and  non-compliant behavior (including use of illegal substances)  2. Notify security of behavior or suspected illegal substances which indicate the need for search of the family and/or belongings  3. Encourage verbalization of thoughts and concerns in a socially appropriate manner  4. Utilize positive, consistent limit setting strategies supporting safety of patient, staff and others  5. Encourage participation in the decision making process about the behavioral management agreement  6. If a visitor's behavior poses a threat to safety call refer to organization policy.  7. Initiate consult with , Psychosocial CNS, Spiritual Care as appropriate  Outcome: Not Progressing     Pt presents with dull affect, anxious mood, circumstantial thought process. Pt has been withdrawn to self on the unit. Pt continues to display attention-seeking behaviors on the unit.  Pt endorses SI, but denies intent or plan. Pt contracts for safety on the unit. Pt denies HI/AVH at this time. Pt remains on 1:1 observation for safety. Pt is medication compliant.   
  Problem: Depression  Goal: Will be euthymic at discharge  Description: INTERVENTIONS:  1. Administer medication as ordered  2. Provide emotional support via 1:1 interaction with staff  3. Encourage involvement in milieu/groups/activities  4. Monitor for social isolation  Outcome: Progressing     Problem: Angelica  Goal: Will exhibit normal sleep and speech and no impulsivity  Description: INTERVENTIONS:  1. Administer medication as ordered  2. Set limits on impulsive behavior  3. Make attempts to decrease external stimuli as possible  Outcome: Progressing     Problem: Psychosis  Goal: Will report no hallucinations or delusions  Description: INTERVENTIONS:  1. Administer medication as  ordered  2. Assist with reality testing to support increasing orientation  3. Assess if patient's hallucinations or delusions are encouraging self harm or harm to others and intervene as appropriate  Outcome: Progressing     Problem: Anxiety  Goal: Will report anxiety at manageable levels  Description: INTERVENTIONS:  1. Administer medication as ordered  2. Teach and rehearse alternative coping skills  3. Provide emotional support with 1:1 interaction with staff  Outcome: Progressing     Pt presents with dull affect, anxious mood, circumstantial thought process. Pt has been withdrawn to self on the unit, but attends unit groups. Pt taken off Line-of-Sight observation due to annelise for safety and denying thought to harm himself on others. Pt denies SI/HI/AVH at this time. Pt is medication compliant.   
  Problem: Depression  Goal: Will be euthymic at discharge  Description: INTERVENTIONS:  1. Administer medication as ordered  2. Provide emotional support via 1:1 interaction with staff  3. Encourage involvement in milieu/groups/activities  4. Monitor for social isolation  Outcome: Progressing     Problem: Angelica  Goal: Will exhibit normal sleep and speech and no impulsivity  Description: INTERVENTIONS:  1. Administer medication as ordered  2. Set limits on impulsive behavior  3. Make attempts to decrease external stimuli as possible  Outcome: Progressing     Problem: Psychosis  Goal: Will report no hallucinations or delusions  Description: INTERVENTIONS:  1. Administer medication as  ordered  2. Assist with reality testing to support increasing orientation  3. Assess if patient's hallucinations or delusions are encouraging self harm or harm to others and intervene as appropriate  Outcome: Progressing     Pt presents with dull affect, depressed mood, preoccupied thought process, delusions of being controlled. Pt displays staff-splitting and medication-seeking behaviors. Pt has been withdrawn to self on the unit. Pt denies SI/HI/AVH at this time. Pt is medication compliant. Pt remains on Line-of Sight observation.   
  Problem: Depression  Goal: Will be euthymic at discharge  Description: INTERVENTIONS:  1. Administer medication as ordered  2. Provide emotional support via 1:1 interaction with staff  3. Encourage involvement in milieu/groups/activities  4. Monitor for social isolation  Outcome: Progressing     Problem: Psychosis  Goal: Will report no hallucinations or delusions  Description: INTERVENTIONS:  1. Administer medication as  ordered  2. Assist with reality testing to support increasing orientation  3. Assess if patient's hallucinations or delusions are encouraging self harm or harm to others and intervene as appropriate  Outcome: Progressing     Pt received in room, alert and oriented x4, pt is cooperative and interacts well with sitter. Pt on 1:1 for safety. Pt presents with a blunted affect, goal-directed thought process and appearance is appropriate for climate. Pt reported SI without plan. Pt denied any HI and denies any hallucinations. Pt is eating, toileting and sleeping well; pt is compliant with medications. Pt verbalized that pt is feeling better anxious 7/10. Pt redirected as pt started to make sounds as if pt was going to cry but no tears were noted; and pt was lying calmly in bed before this RN came in to assess pt. Stressed the importance of medication compliance and positive coping skills; encouraged pt to talk to staff in case pt feels anxious or agitated before the situation gets out of hand; pt nodded in understanding. No aggression noted at this time. Pt with active care plans in place.    
  Problem: Psychosis  Goal: Will report no hallucinations or delusions  Description: INTERVENTIONS:  1. Administer medication as  ordered  2. Assist with reality testing to support increasing orientation  3. Assess if patient's hallucinations or delusions are encouraging self harm or harm to others and intervene as appropriate  Outcome: Progressing     Problem: Behavior  Goal: Pt/Family maintain appropriate behavior and adhere to behavioral management agreement, if implemented  Description: INTERVENTIONS:  1. Assess patient/family's coping skills and  non-compliant behavior (including use of illegal substances)  2. Notify security of behavior or suspected illegal substances which indicate the need for search of the family and/or belongings  3. Encourage verbalization of thoughts and concerns in a socially appropriate manner  4. Utilize positive, consistent limit setting strategies supporting safety of patient, staff and others  5. Encourage participation in the decision making process about the behavioral management agreement  6. If a visitor's behavior poses a threat to safety call refer to organization policy.  7. Initiate consult with , Psychosocial CNS, Spiritual Care as appropriate  Outcome: Progressing     Problem: Self Care Deficit  Goal: Return ADL status to a safe level of function  Description: INTERVENTIONS:  1. Administer medication as ordered  2. Assess ADL deficits and provide assistive devices as needed  3. Obtain PT/OT consults as needed  4. Assist and instruct patient to increase activity and self care as tolerated  Outcome: Progressing     Problem: Coping  Goal: Pt/Family able to verbalize concerns and demonstrate effective coping strategies  Description: INTERVENTIONS:  1. Assist patient/family to identify coping skills, available support systems and cultural and spiritual values  2. Provide emotional support, including active listening and acknowledgement of concerns of patient and 
  Problem: Self Care Deficit  Goal: Return ADL status to a safe level of function  Description: INTERVENTIONS:  1. Administer medication as ordered  2. Assess ADL deficits and provide assistive devices as needed  3. Obtain PT/OT consults as needed  4. Assist and instruct patient to increase activity and self care as tolerated  Outcome: Progressing     Problem: Depression  Goal: Will be euthymic at discharge  Description: INTERVENTIONS:  1. Administer medication as ordered  2. Provide emotional support via 1:1 interaction with staff  3. Encourage involvement in milieu/groups/activities  4. Monitor for social isolation  Outcome: Not Progressing     Patient alert and oriented, in no acute distress at this time.   Patient denies suicidal ideations, harm to self or others, auditory or visual hallucinations, no unsafe behaviors noted at this time.  Patient states \" I don't feel anything when asked how he was feeling today. Patient is currently in the milieu with sitter. Will continue to monitor for location, safety and comfort.           
  Problem: Self Harm/Suicidality  Goal: Will have no self-injury during hospital stay  Description: INTERVENTIONS:  1.  Ensure constant observer at bedside with Q15M safety checks  2.  Maintain a safe environment  3.  Secure patient belongings  4.  Ensure family/visitors adhere to safety recommendations  5.  Ensure safety tray has been added to patient's diet order  6.  Every shift and PRN: Re-assess suicidal risk via Frequent Screener     Problem: Depression  Goal: Will be euthymic at discharge  Description: INTERVENTIONS:  1. Administer medication as ordered  2. Provide emotional support via 1:1 interaction with staff  3. Encourage involvement in milieu/groups/activities  4. Monitor for social isolation  Outcome: Progressing     Problem: Anxiety  Goal: Will report anxiety at manageable levels  Description: INTERVENTIONS:  1. Administer medication as ordered  2. Teach and rehearse alternative coping skills  3. Provide emotional support with 1:1 interaction with staff  Outcome: Progressing     
  Problem: Self Harm/Suicidality  Goal: Will have no self-injury during hospital stay  Description: INTERVENTIONS:  1.  Ensure constant observer at bedside with Q15M safety checks  2.  Maintain a safe environment  3.  Secure patient belongings  4.  Ensure family/visitors adhere to safety recommendations  5.  Ensure safety tray has been added to patient's diet order  6.  Every shift and PRN: Re-assess suicidal risk via Frequent Screener    8/10/2024 2038 by Kellen Moffett RN  Flowsheets (Taken 8/3/2024 2127 by Jovan Mahan RN)  Will have no self-injury during hospital stay:   Ensure constant observer at bedside with Q15M safety checks   Ensure family/visitors adhere to safety recommendations   Every shift and PRN: Re-assess suicidal risk via Frequent Screener   Maintain a safe environment   Ensure safety tray has been added to patient's diet order   Secure patient belongings  8/10/2024 0851 by Teresa Hilario, RN  Outcome: Progressing     Problem: Depression  Goal: Will be euthymic at discharge  Description: INTERVENTIONS:  1. Administer medication as ordered  2. Provide emotional support via 1:1 interaction with staff  3. Encourage involvement in milieu/groups/activities  4. Monitor for social isolation  8/10/2024 0851 by Teresa Hilario, RN  Outcome: Progressing   Pt. Is visible in the milieu.He has minimal interaction with peers and staff. He stated that he still feel anxious and depressed but no thoughts of hurting self. He is pleasant and compliant with his medications. Will continue to monitor for safety and provise support as needed.  
  Problem: Self Harm/Suicidality  Goal: Will have no self-injury during hospital stay  Description: INTERVENTIONS:  1.  Ensure constant observer at bedside with Q15M safety checks  2.  Maintain a safe environment  3.  Secure patient belongings  4.  Ensure family/visitors adhere to safety recommendations  5.  Ensure safety tray has been added to patient's diet order  6.  Every shift and PRN: Re-assess suicidal risk via Frequent Screener    8/11/2024 0903 by Heather Escalona, RN  Outcome: Progressing  8/10/2024 2038 by Kellen Moffett RN  Flowsheets (Taken 8/3/2024 2127 by Jovan Mahan, RN)  Will have no self-injury during hospital stay:   Ensure constant observer at bedside with Q15M safety checks   Ensure family/visitors adhere to safety recommendations   Every shift and PRN: Re-assess suicidal risk via Frequent Screener   Maintain a safe environment   Ensure safety tray has been added to patient's diet order   Secure patient belongings     Problem: Behavior  Goal: Pt/Family maintain appropriate behavior and adhere to behavioral management agreement, if implemented  Description: INTERVENTIONS:  1. Assess patient/family's coping skills and  non-compliant behavior (including use of illegal substances)  2. Notify security of behavior or suspected illegal substances which indicate the need for search of the family and/or belongings  3. Encourage verbalization of thoughts and concerns in a socially appropriate manner  4. Utilize positive, consistent limit setting strategies supporting safety of patient, staff and others  5. Encourage participation in the decision making process about the behavioral management agreement  6. If a visitor's behavior poses a threat to safety call refer to organization policy.  7. Initiate consult with , Psychosocial CNS, Spiritual Care as appropriate  Outcome: Progressing     Problem: Depression  Goal: Will be euthymic at discharge  Description: INTERVENTIONS:  1. Administer 
  Problem: Self Harm/Suicidality  Goal: Will have no self-injury during hospital stay  Description: INTERVENTIONS:  1.  Ensure constant observer at bedside with Q15M safety checks  2.  Maintain a safe environment  3.  Secure patient belongings  4.  Ensure family/visitors adhere to safety recommendations  5.  Ensure safety tray has been added to patient's diet order  6.  Every shift and PRN: Re-assess suicidal risk via Frequent Screener    8/11/2024 1954 by Kellen Moffett RN  Flowsheets (Taken 8/3/2024 2127 by Jovan Mahan RN)  Will have no self-injury during hospital stay:   Ensure constant observer at bedside with Q15M safety checks   Ensure family/visitors adhere to safety recommendations   Every shift and PRN: Re-assess suicidal risk via Frequent Screener   Maintain a safe environment   Ensure safety tray has been added to patient's diet order   Secure patient belongings  8/11/2024 0903 by Heather Escalona, RN  Outcome: Progressing     Problem: Depression  Goal: Will be euthymic at discharge  Description: INTERVENTIONS:  1. Administer medication as ordered  2. Provide emotional support via 1:1 interaction with staff  3. Encourage involvement in milieu/groups/activities  4. Monitor for social isolation  8/11/2024 0903 by Heather Escalona, RN  Outcome: Progressing   Pt. has a brighter mood this evening. He interact well with peers and staff. He is free from falls, self harm or harming others.  
  Problem: Self Harm/Suicidality  Goal: Will have no self-injury during hospital stay  Description: INTERVENTIONS:  1.  Ensure constant observer at bedside with Q15M safety checks  2.  Maintain a safe environment  3.  Secure patient belongings  4.  Ensure family/visitors adhere to safety recommendations  5.  Ensure safety tray has been added to patient's diet order  6.  Every shift and PRN: Re-assess suicidal risk via Frequent Screener    8/13/2024 0102 by Jovan Mahan RN  Outcome: Progressing  8/12/2024 1309 by Catherine Mcnally RN  Outcome: Progressing     Problem: Depression  Goal: Will be euthymic at discharge  Description: INTERVENTIONS:  1. Administer medication as ordered  2. Provide emotional support via 1:1 interaction with staff  3. Encourage involvement in milieu/groups/activities  4. Monitor for social isolation  8/13/2024 0102 by Jovan Mahan RN  Outcome: Progressing  8/12/2024 1309 by Catherine Mcnally RN  Outcome: Progressing     Problem: Behavior  Goal: Pt/Family maintain appropriate behavior and adhere to behavioral management agreement, if implemented  Description: INTERVENTIONS:  1. Assess patient/family's coping skills and  non-compliant behavior (including use of illegal substances)  2. Notify security of behavior or suspected illegal substances which indicate the need for search of the family and/or belongings  3. Encourage verbalization of thoughts and concerns in a socially appropriate manner  4. Utilize positive, consistent limit setting strategies supporting safety of patient, staff and others  5. Encourage participation in the decision making process about the behavioral management agreement  6. If a visitor's behavior poses a threat to safety call refer to organization policy.  7. Initiate consult with , Psychosocial CNS, Spiritual Care as appropriate  8/12/2024 1309 by Catherine Mcnally RN  Outcome: Progressing     Problem: Anxiety  Goal: Will report anxiety at manageable 
  Problem: Self Harm/Suicidality  Goal: Will have no self-injury during hospital stay  Description: INTERVENTIONS:  1.  Ensure constant observer at bedside with Q15M safety checks  2.  Maintain a safe environment  3.  Secure patient belongings  4.  Ensure family/visitors adhere to safety recommendations  5.  Ensure safety tray has been added to patient's diet order  6.  Every shift and PRN: Re-assess suicidal risk via Frequent Screener    8/13/2024 1204 by Catherine Mcnally, RN  Outcome: Progressing     Problem: Behavior  Goal: Pt/Family maintain appropriate behavior and adhere to behavioral management agreement, if implemented  Description: INTERVENTIONS:  1. Assess patient/family's coping skills and  non-compliant behavior (including use of illegal substances)  2. Notify security of behavior or suspected illegal substances which indicate the need for search of the family and/or belongings  3. Encourage verbalization of thoughts and concerns in a socially appropriate manner  4. Utilize positive, consistent limit setting strategies supporting safety of patient, staff and others  5. Encourage participation in the decision making process about the behavioral management agreement  6. If a visitor's behavior poses a threat to safety call refer to organization policy.  7. Initiate consult with , Psychosocial CNS, Spiritual Care as appropriate  Outcome: Progressing     Problem: Anxiety  Goal: Will report anxiety at manageable levels  Description: INTERVENTIONS:  1. Administer medication as ordered  2. Teach and rehearse alternative coping skills  3. Provide emotional support with 1:1 interaction with staff  8/13/2024 1204 by Catherine Mcnally, RN  Outcome: Progressing     Problem: Anxiety  Goal: Will report anxiety at manageable levels  Description: INTERVENTIONS:  1. Administer medication as ordered  2. Teach and rehearse alternative coping skills  3. Provide emotional support with 1:1 interaction with 
  Problem: Self Harm/Suicidality  Goal: Will have no self-injury during hospital stay  Description: INTERVENTIONS:  1.  Ensure constant observer at bedside with Q15M safety checks  2.  Maintain a safe environment  3.  Secure patient belongings  4.  Ensure family/visitors adhere to safety recommendations  5.  Ensure safety tray has been added to patient's diet order  6.  Every shift and PRN: Re-assess suicidal risk via Frequent Screener    8/13/2024 2252 by Jovan Mahan RN  Outcome: Progressing  8/13/2024 1204 by Catherine Mcnally RN  Outcome: Progressing     Problem: Depression  Goal: Will be euthymic at discharge  Description: INTERVENTIONS:  1. Administer medication as ordered  2. Provide emotional support via 1:1 interaction with staff  3. Encourage involvement in milieu/groups/activities  4. Monitor for social isolation  Outcome: Progressing     Problem: Behavior  Goal: Pt/Family maintain appropriate behavior and adhere to behavioral management agreement, if implemented  Description: INTERVENTIONS:  1. Assess patient/family's coping skills and  non-compliant behavior (including use of illegal substances)  2. Notify security of behavior or suspected illegal substances which indicate the need for search of the family and/or belongings  3. Encourage verbalization of thoughts and concerns in a socially appropriate manner  4. Utilize positive, consistent limit setting strategies supporting safety of patient, staff and others  5. Encourage participation in the decision making process about the behavioral management agreement  6. If a visitor's behavior poses a threat to safety call refer to organization policy.  7. Initiate consult with , Psychosocial CNS, Spiritual Care as appropriate  8/13/2024 1204 by Catherine Mcnally RN  Outcome: Progressing     Problem: Anxiety  Goal: Will report anxiety at manageable levels  Description: INTERVENTIONS:  1. Administer medication as ordered  2. Teach and rehearse 
  Problem: Self Harm/Suicidality  Goal: Will have no self-injury during hospital stay  Description: INTERVENTIONS:  1.  Ensure constant observer at bedside with Q15M safety checks  2.  Maintain a safe environment  3.  Secure patient belongings  4.  Ensure family/visitors adhere to safety recommendations  5.  Ensure safety tray has been added to patient's diet order  6.  Every shift and PRN: Re-assess suicidal risk via Frequent Screener    8/19/2024 2054 by Clara Cnao RN  Outcome: Progressing  8/19/2024 1335 by Clara Cano RN  Outcome: Progressing     Problem: Depression  Goal: Will be euthymic at discharge  Description: INTERVENTIONS:  1. Administer medication as ordered  2. Provide emotional support via 1:1 interaction with staff  3. Encourage involvement in milieu/groups/activities  4. Monitor for social isolation  8/19/2024 2054 by Clara Cano RN  Outcome: Progressing  8/19/2024 1335 by Clara Cano RN  Outcome: Progressing     Problem: Angelica  Goal: Will exhibit normal sleep and speech and no impulsivity  Description: INTERVENTIONS:  1. Administer medication as ordered  2. Set limits on impulsive behavior  3. Make attempts to decrease external stimuli as possible  8/19/2024 2054 by Clara Cano RN  Outcome: Progressing  8/19/2024 1335 by Clara Cano RN  Outcome: Progressing     Pt presents with dull affect, anxious mood, goal-directed thought process.  Pt continues to display attention-seeking and staff-splitting behaviors on the unit. Pt has been selectively social on the unit. Pt endorses SI, but denies intent or plan. Pt contracts for safety on the unit. Pt denies HI/AVH at this time. Pt is medication compliant.   
  Problem: Self Harm/Suicidality  Goal: Will have no self-injury during hospital stay  Description: INTERVENTIONS:  1.  Ensure constant observer at bedside with Q15M safety checks  2.  Maintain a safe environment  3.  Secure patient belongings  4.  Ensure family/visitors adhere to safety recommendations  5.  Ensure safety tray has been added to patient's diet order  6.  Every shift and PRN: Re-assess suicidal risk via Frequent Screener    8/20/2024 0824 by Tereas Hilario RN  Outcome: Progressing  8/19/2024 2054 by Clara Cano RN  Outcome: Progressing     Problem: Depression  Goal: Will be euthymic at discharge  Description: INTERVENTIONS:  1. Administer medication as ordered  2. Provide emotional support via 1:1 interaction with staff  3. Encourage involvement in milieu/groups/activities  4. Monitor for social isolation  8/20/2024 0824 by Teresa Hilario RN  Outcome: Progressing  8/19/2024 2054 by Clara Cano RN  Outcome: Progressing     Problem: Angelica  Goal: Will exhibit normal sleep and speech and no impulsivity  Description: INTERVENTIONS:  1. Administer medication as ordered  2. Set limits on impulsive behavior  3. Make attempts to decrease external stimuli as possible  8/19/2024 2054 by Clara Cano RN  Outcome: Progressing    Patient calm and cooperative. Patient meal and medication compliant this morning. Patient denies SI, HI, anxiety, depression, pain and AVH. Patient states \"this is the best I felt, I guess it comes and goes\" with a chuckle. Patient mood is flat and sad incongruent with how he describes his mood as \"good\". Patient withdrawn to self on unit social at times. Will continue to monitor for ongoing care.      
  Problem: Self Harm/Suicidality  Goal: Will have no self-injury during hospital stay  Description: INTERVENTIONS:  1.  Ensure constant observer at bedside with Q15M safety checks  2.  Maintain a safe environment  3.  Secure patient belongings  4.  Ensure family/visitors adhere to safety recommendations  5.  Ensure safety tray has been added to patient's diet order  6.  Every shift and PRN: Re-assess suicidal risk via Frequent Screener    8/20/2024 2158 by Jocelyn Ford RN  Outcome: Progressing  8/20/2024 0824 by Teresa Hilario RN  Outcome: Progressing     Problem: Depression  Goal: Will be euthymic at discharge  Description: INTERVENTIONS:  1. Administer medication as ordered  2. Provide emotional support via 1:1 interaction with staff  3. Encourage involvement in milieu/groups/activities  4. Monitor for social isolation  8/20/2024 2158 by Jocelyn Ford RN  Outcome: Progressing  8/20/2024 0824 by Teresa Hilario RN  Outcome: Progressing     Problem: Angelica  Goal: Will exhibit normal sleep and speech and no impulsivity  Description: INTERVENTIONS:  1. Administer medication as ordered  2. Set limits on impulsive behavior  3. Make attempts to decrease external stimuli as possible  Outcome: Progressing     Problem: Psychosis  Goal: Will report no hallucinations or delusions  Description: INTERVENTIONS:  1. Administer medication as  ordered  2. Assist with reality testing to support increasing orientation  3. Assess if patient's hallucinations or delusions are encouraging self harm or harm to others and intervene as appropriate  Outcome: Progressing     Problem: Behavior  Goal: Pt/Family maintain appropriate behavior and adhere to behavioral management agreement, if implemented  Description: INTERVENTIONS:  1. Assess patient/family's coping skills and  non-compliant behavior (including use of illegal substances)  2. Notify security of behavior or suspected illegal substances which indicate the need for search of 
  Problem: Self Harm/Suicidality  Goal: Will have no self-injury during hospital stay  Description: INTERVENTIONS:  1.  Ensure constant observer at bedside with Q15M safety checks  2.  Maintain a safe environment  3.  Secure patient belongings  4.  Ensure family/visitors adhere to safety recommendations  5.  Ensure safety tray has been added to patient's diet order  6.  Every shift and PRN: Re-assess suicidal risk via Frequent Screener    8/21/2024 0840 by Heather Escalona RN  Outcome: Progressing  8/20/2024 2158 by Jocelyn Ford RN  Outcome: Progressing     Problem: Behavior  Goal: Pt/Family maintain appropriate behavior and adhere to behavioral management agreement, if implemented  Description: INTERVENTIONS:  1. Assess patient/family's coping skills and  non-compliant behavior (including use of illegal substances)  2. Notify security of behavior or suspected illegal substances which indicate the need for search of the family and/or belongings  3. Encourage verbalization of thoughts and concerns in a socially appropriate manner  4. Utilize positive, consistent limit setting strategies supporting safety of patient, staff and others  5. Encourage participation in the decision making process about the behavioral management agreement  6. If a visitor's behavior poses a threat to safety call refer to organization policy.  7. Initiate consult with , Psychosocial CNS, Spiritual Care as appropriate  8/21/2024 0840 by Heather Escalona RN  Outcome: Progressing  8/20/2024 2158 by Jocelyn Ford RN  Outcome: Progressing     Problem: Anxiety  Goal: Will report anxiety at manageable levels  Description: INTERVENTIONS:  1. Administer medication as ordered  2. Teach and rehearse alternative coping skills  3. Provide emotional support with 1:1 interaction with staff  8/21/2024 0840 by Heather Escalona, RN  Outcome: Progressing   Patient demonstrates a flat affect, depressed mood. Patient states he is mildly anxious, 
  Problem: Self Harm/Suicidality  Goal: Will have no self-injury during hospital stay  Description: INTERVENTIONS:  1.  Ensure constant observer at bedside with Q15M safety checks  2.  Maintain a safe environment  3.  Secure patient belongings  4.  Ensure family/visitors adhere to safety recommendations  5.  Ensure safety tray has been added to patient's diet order  6.  Every shift and PRN: Re-assess suicidal risk via Frequent Screener    8/22/2024 2112 by Edna Sánchez, RN  Outcome: Progressing     2112- pt has been in day area isolated to self.  Pt reported not having a good day and endorsed feelings of depression.  Denied si/hi/avh during time of assessment.  Voice really low.  Compliant with meds.  All needs assessed and met.  Will continue to monitor and support as needed.   
  Problem: Self Harm/Suicidality  Goal: Will have no self-injury during hospital stay  Description: INTERVENTIONS:  1.  Ensure constant observer at bedside with Q15M safety checks  2.  Maintain a safe environment  3.  Secure patient belongings  4.  Ensure family/visitors adhere to safety recommendations  5.  Ensure safety tray has been added to patient's diet order  6.  Every shift and PRN: Re-assess suicidal risk via Frequent Screener    8/24/2024 0968 by Clara Cano RN  Outcome: Progressing     Problem: Depression  Goal: Will be euthymic at discharge  Description: INTERVENTIONS:  1. Administer medication as ordered  2. Provide emotional support via 1:1 interaction with staff  3. Encourage involvement in milieu/groups/activities  4. Monitor for social isolation  Outcome: Progressing     Problem: Angelica  Goal: Will exhibit normal sleep and speech and no impulsivity  Description: INTERVENTIONS:  1. Administer medication as ordered  2. Set limits on impulsive behavior  3. Make attempts to decrease external stimuli as possible  Outcome: Progressing     Pt presents with worried affect, anxious mood, circumstantial thought process. Pt states, \"I don't have feelings. It makes me feel like I'm going to lose it.\" Pt visibly anxious this morning, and received PRN Atarax shortly after receiving his scheduled morning medication including his scheduled Clonopin. Pt endorses SI, but denies intent or plan. Pt contracts for safety. Pt denies HI/AVH at this time. Pt is medication compliant.   
  Problem: Self Harm/Suicidality  Goal: Will have no self-injury during hospital stay  Description: INTERVENTIONS:  1.  Ensure constant observer at bedside with Q15M safety checks  2.  Maintain a safe environment  3.  Secure patient belongings  4.  Ensure family/visitors adhere to safety recommendations  5.  Ensure safety tray has been added to patient's diet order  6.  Every shift and PRN: Re-assess suicidal risk via Frequent Screener    8/27/2024 1042 by Anika Ford, RN  Outcome: Progressing     Problem: Depression  Goal: Will be euthymic at discharge  Description: INTERVENTIONS:  1. Administer medication as ordered  2. Provide emotional support via 1:1 interaction with staff  3. Encourage involvement in milieu/groups/activities  4. Monitor for social isolation  8/27/2024 1042 by Anika Ford, RN  Outcome: Progressing     Problem: Anxiety  Goal: Will report anxiety at manageable levels  Description: INTERVENTIONS:  1. Administer medication as ordered  2. Teach and rehearse alternative coping skills  3. Provide emotional support with 1:1 interaction with staff  8/27/2024 1042 by Anika Ford, RN  Outcome: Progressing    Pt alert & oriented x 3 and able to make needs known. Pt cooperative and pleasant. Affect is bright and patient denies any feelings of anxiousness and depression. Pt denies thoughts of SI/HI & hallucinations. Pt tolerated medications well. Active care plan in place.      
  Problem: Self Harm/Suicidality  Goal: Will have no self-injury during hospital stay  Description: INTERVENTIONS:  1.  Ensure constant observer at bedside with Q15M safety checks  2.  Maintain a safe environment  3.  Secure patient belongings  4.  Ensure family/visitors adhere to safety recommendations  5.  Ensure safety tray has been added to patient's diet order  6.  Every shift and PRN: Re-assess suicidal risk via Frequent Screener    8/27/2024 2118 by Edna Sánchez RN  Outcome: Progressing     2118-pt has been isolated to self in room.  Remains 1:1 for safety.  Denied si/hi/avh during time of assessment.  All needs assessed and met.  Remains compliant with meds.  Will continue to monitor and support as needed.   
  Problem: Self Harm/Suicidality  Goal: Will have no self-injury during hospital stay  Description: INTERVENTIONS:  1.  Ensure constant observer at bedside with Q15M safety checks  2.  Maintain a safe environment  3.  Secure patient belongings  4.  Ensure family/visitors adhere to safety recommendations  5.  Ensure safety tray has been added to patient's diet order  6.  Every shift and PRN: Re-assess suicidal risk via Frequent Screener    8/29/2024 2206 by Kellen Moffett RN  Flowsheets (Taken 8/29/2024 2206)  Will have no self-injury during hospital stay:   Ensure constant observer at bedside with Q15M safety checks   Secure patient belongings   Ensure safety tray has been added to patient's diet order   Every shift and PRN: Re-assess suicidal risk via Frequent Screener  8/29/2024 1242 by Clara Cano RN  Outcome: Progressing     Problem: Depression  Goal: Will be euthymic at discharge  Description: INTERVENTIONS:  1. Administer medication as ordered  2. Provide emotional support via 1:1 interaction with staff  3. Encourage involvement in milieu/groups/activities  4. Monitor for social isolation  8/29/2024 1242 by Clara Cano, RN  Outcome: Progressing     Problem: Angelica  Goal: Will exhibit normal sleep and speech and no impulsivity  Description: INTERVENTIONS:  1. Administer medication as ordered  2. Set limits on impulsive behavior  3. Make attempts to decrease external stimuli as possible  8/29/2024 1242 by Clara Cano, RN  Outcome: Progressing     
  Problem: Self Harm/Suicidality  Goal: Will have no self-injury during hospital stay  Description: INTERVENTIONS:  1.  Ensure constant observer at bedside with Q15M safety checks  2.  Maintain a safe environment  3.  Secure patient belongings  4.  Ensure family/visitors adhere to safety recommendations  5.  Ensure safety tray has been added to patient's diet order  6.  Every shift and PRN: Re-assess suicidal risk via Frequent Screener    8/4/2024 1049 by Anika Ford RN  Outcome: Not Progressing     Problem: Depression  Goal: Will be euthymic at discharge  Description: INTERVENTIONS:  1. Administer medication as ordered  2. Provide emotional support via 1:1 interaction with staff  3. Encourage involvement in milieu/groups/activities  4. Monitor for social isolation  8/4/2024 1049 by Anika Ford RN  Outcome: Not Progressing     Problem: Anxiety  Goal: Will report anxiety at manageable levels  Description: INTERVENTIONS:  1. Administer medication as ordered  2. Teach and rehearse alternative coping skills  3. Provide emotional support with 1:1 interaction with staff  8/4/2024 1049 by Anika Ford RN  Outcome: Not Progressing     Problem: Self Harm/Suicidality  Goal: Will have no self-injury during hospital stay  Description: INTERVENTIONS:  1.  Ensure constant observer at bedside with Q15M safety checks  2.  Maintain a safe environment  3.  Secure patient belongings  4.  Ensure family/visitors adhere to safety recommendations  5.  Ensure safety tray has been added to patient's diet order  6.  Every shift and PRN: Re-assess suicidal risk via Frequent Screener    8/4/2024 1049 by Anika Ford RN  Outcome: Not Progressing  8/3/2024 2136 by Jovan Mahan RN  Outcome: Progressing  Flowsheets (Taken 8/3/2024 2127)  Will have no self-injury during hospital stay:   Ensure constant observer at bedside with Q15M safety checks   Ensure family/visitors adhere to safety recommendations   Every shift and 
  Problem: Self Harm/Suicidality  Goal: Will have no self-injury during hospital stay  Description: INTERVENTIONS:  1.  Ensure constant observer at bedside with Q15M safety checks  2.  Maintain a safe environment  3.  Secure patient belongings  4.  Ensure family/visitors adhere to safety recommendations  5.  Ensure safety tray has been added to patient's diet order  6.  Every shift and PRN: Re-assess suicidal risk via Frequent Screener    8/4/2024 2118 by Jovan Mahan RN  Outcome: Not Progressing  8/4/2024 1049 by Anika Ford RN  Outcome: Not Progressing     Problem: Depression  Goal: Will be euthymic at discharge  Description: INTERVENTIONS:  1. Administer medication as ordered  2. Provide emotional support via 1:1 interaction with staff  3. Encourage involvement in milieu/groups/activities  4. Monitor for social isolation  8/4/2024 2118 by Jovan Mahan RN  Outcome: Not Progressing  8/4/2024 1049 by Anika Ford RN  Outcome: Not Progressing     Problem: Anxiety  Goal: Will report anxiety at manageable levels  Description: INTERVENTIONS:  1. Administer medication as ordered  2. Teach and rehearse alternative coping skills  3. Provide emotional support with 1:1 interaction with staff  8/4/2024 2118 by Jovan Mahan RN  Outcome: Not Progressing  8/4/2024 1049 by Anika Ford RN  Outcome: Not Progressing     @1925, RN approached pt to talk about issues he was having. Pt asked to be restrained, locked up or sent to [facility] because of \"Things I have done, I am a terrible person, I have done things that you cannot imagine.\" Pt continued to become very tearful and and agitated with himself and made the statement \"as soon as I leave here, I will fin a gun and kill myself.\" Pt was clinching fists and slamming them on bed. RN comforted the pt, made him feel safe and eventually got pt to calm down. On-call psychiatrist was paged and ordered pt be put on 1:1 observation, and ordered 5 mg Zyprexa 
  Problem: Self Harm/Suicidality  Goal: Will have no self-injury during hospital stay  Description: INTERVENTIONS:  1.  Ensure constant observer at bedside with Q15M safety checks  2.  Maintain a safe environment  3.  Secure patient belongings  4.  Ensure family/visitors adhere to safety recommendations  5.  Ensure safety tray has been added to patient's diet order  6.  Every shift and PRN: Re-assess suicidal risk via Frequent Screener    8/5/2024 2251 by Jovan Mahan RN  Outcome: Not Progressing     Problem: Anxiety  Goal: Will report anxiety at manageable levels  Description: INTERVENTIONS:  1. Administer medication as ordered  2. Teach and rehearse alternative coping skills  3. Provide emotional support with 1:1 interaction with staff  8/5/2024 2251 by Jovan Mahan RN  Outcome: Not Progressing     Problem: Self Harm/Suicidality  Goal: Will have no self-injury during hospital stay  Description: INTERVENTIONS:  1.  Ensure constant observer at bedside with Q15M safety checks  2.  Maintain a safe environment    Patient cooperative this morning eating breakfast. Medication compliant. Patient denies SI, HI, AVH and pain. Patient endorses anxiety and depression 6/10. Patient mood is depressed with congruent affect. Patient remains on 1:1. Patient describes his mood as \"so far okay\". Will continue to monitor for ongoing care.  3.  Secure patient belongings  4.  Ensure family/visitors adhere to safety recommendations  5.  Ensure safety tray has been added to patient's diet order  6.  Every shift and PRN: Re-assess suicidal risk via Frequent Screener    8/5/2024 2251 by Jovan Mahan RN  Outcome: Not Progressing     Problem: Anxiety  Goal: Will report anxiety at manageable levels  Description: INTERVENTIONS:  1. Administer medication as ordered  2. Teach and rehearse alternative coping skills  3. Provide emotional support with 1:1 interaction with staff  8/5/2024 2251 by Jovan Mahan RN  Outcome: Not 
  Problem: Self Harm/Suicidality  Goal: Will have no self-injury during hospital stay  Description: INTERVENTIONS:  1.  Ensure constant observer at bedside with Q15M safety checks  2.  Maintain a safe environment  3.  Secure patient belongings  4.  Ensure family/visitors adhere to safety recommendations  5.  Ensure safety tray has been added to patient's diet order  6.  Every shift and PRN: Re-assess suicidal risk via Frequent Screener    8/8/2024 2216 by Edna Sánchez, RN  Outcome: Progressing     2000- pt has been isolated to self.  Spent equal amount of time in day area watching tv and room.  Denied si/hi/avh during time of assessment.  Remains compliant with meds.  Pt reported, \"I hope I sleep tonight.  I don't usually sleep unless I have 6 mg of Xanax.\"  Pt was encouraged to try to get rest and prn med available if needed.   Will continue to monitor and support as needed.   
  Problem: Self Harm/Suicidality  Goal: Will have no self-injury during hospital stay  Description: INTERVENTIONS:  1.  Ensure constant observer at bedside with Q15M safety checks  2.  Maintain a safe environment  3.  Secure patient belongings  4.  Ensure family/visitors adhere to safety recommendations  5.  Ensure safety tray has been added to patient's diet order  6.  Every shift and PRN: Re-assess suicidal risk via Frequent Screener    8/9/2024 2342 by Jovan Mahan RN  Outcome: Progressing  8/9/2024 1131 by Clara Cano RN  Outcome: Progressing     Problem: Depression  Goal: Will be euthymic at discharge  Description: INTERVENTIONS:  1. Administer medication as ordered  2. Provide emotional support via 1:1 interaction with staff  3. Encourage involvement in milieu/groups/activities  4. Monitor for social isolation  8/9/2024 2342 by Jovna Mahan RN  Outcome: Progressing  8/9/2024 1131 by Clara Cano RN  Outcome: Progressing     Problem: Angelica  Goal: Will exhibit normal sleep and speech and no impulsivity  Description: INTERVENTIONS:  1. Administer medication as ordered  2. Set limits on impulsive behavior  3. Make attempts to decrease external stimuli as possible  8/9/2024 1131 by Clara Cano RN  Outcome: Progressing     Problem: Anxiety  Goal: Will report anxiety at manageable levels  Description: INTERVENTIONS:  1. Administer medication as ordered  2. Teach and rehearse alternative coping skills  3. Provide emotional support with 1:1 interaction with staff  Outcome: Not Progressing    Pt has been friendly, cooperative and medication compliant. Pt presents brightened facial expressions, a congruent affect and an anxious mood. Pt denies SI/HI/AVH but reports having feelings of anxiety (4/10) caused by \"just working through stuff, but it is so much better.\" Pt also reported having trouble falling sleeping, 50 mg Trazodone was administered. Pt is currently in room asleep, will continue to 
  Problem: Self Harm/Suicidality  Goal: Will have no self-injury during hospital stay  Description: INTERVENTIONS:  1.  Ensure constant observer at bedside with Q15M safety checks  2.  Maintain a safe environment  3.  Secure patient belongings  4.  Ensure family/visitors adhere to safety recommendations  5.  Ensure safety tray has been added to patient's diet order  6.  Every shift and PRN: Re-assess suicidal risk via Frequent Screener    9/10/2024 0942 by Anika Ford, RN  Outcome: Progressing     Problem: Depression  Goal: Will be euthymic at discharge  Description: INTERVENTIONS:  1. Administer medication as ordered  2. Provide emotional support via 1:1 interaction with staff  3. Encourage involvement in milieu/groups/activities  4. Monitor for social isolation  9/10/2024 0942 by Anika Ford, RN  Outcome: Progressing     Problem: Anxiety  Goal: Will report anxiety at manageable levels  Description: INTERVENTIONS:  1. Administer medication as ordered  2. Teach and rehearse alternative coping skills  3. Provide emotional support with 1:1 interaction with staff  9/10/2024 0942 by Anika Ford, RN  Outcome: Progressing  Pt alert & oriented x 3 and able to make needs known. Pt cooperative, pleasant, and stable. Affect is flat. Patient denies any feelings of anxiousness and depression. Pt denies thoughts of SI/HI & hallucinations. Pt tolerated medications well. Active care plan in place.   
  Problem: Self Harm/Suicidality  Goal: Will have no self-injury during hospital stay  Description: INTERVENTIONS:  1.  Ensure constant observer at bedside with Q15M safety checks  2.  Maintain a safe environment  3.  Secure patient belongings  4.  Ensure family/visitors adhere to safety recommendations  5.  Ensure safety tray has been added to patient's diet order  6.  Every shift and PRN: Re-assess suicidal risk via Frequent Screener    9/10/2024 2111 by Riri Lutz, RN  Outcome: Progressing  Flowsheets (Taken 9/10/2024 2111)  Will have no self-injury during hospital stay:   Maintain a safe environment   Ensure constant observer at bedside with Q15M safety checks     Problem: Depression  Goal: Will be euthymic at discharge  Description: INTERVENTIONS:  1. Administer medication as ordered  2. Provide emotional support via 1:1 interaction with staff  3. Encourage involvement in milieu/groups/activities  4. Monitor for social isolation  9/10/2024 2111 by Riri Lutz, RN  Outcome: Progressing     Pt received in dayroom, alert and oriented x4, pt cooperative and interacted well with sitter. Pt on 1:1 for safety. Pt presented with a blunted affect, goal-directed thought process and appearance is appropriate for climate. Pt denied any SI/HI and denied hallucinations. Pt reported, \"I don't have homicidal thoughts but sometimes I have thoughts to assault staff.\" Pt verbally contracted for safety. Pt eating, toileting and sleeping well; pt is compliant with medications. Pt verbalized that pt is feeling anxious 6/10 this evening. Stressed the importance of medication compliance and positive coping skills; encouraged pt to talk to staff in case pt feels anxious or agitated before the situation gets out of hand; pt nodded in understanding. No aggression noted at this time. Pt with active care plans in place.    
  Problem: Self Harm/Suicidality  Goal: Will have no self-injury during hospital stay  Description: INTERVENTIONS:  1.  Ensure constant observer at bedside with Q15M safety checks  2.  Maintain a safe environment  3.  Secure patient belongings  4.  Ensure family/visitors adhere to safety recommendations  5.  Ensure safety tray has been added to patient's diet order  6.  Every shift and PRN: Re-assess suicidal risk via Frequent Screener    9/11/2024 2042 by Kellen Moffett RN  Flowsheets (Taken 9/10/2024 2111 by Riri Lutz, RN)  Will have no self-injury during hospital stay:   Maintain a safe environment   Ensure constant observer at bedside with Q15M safety checks  9/11/2024 1239 by Clara Cano, RN  Outcome: Progressing     Problem: Anxiety  Goal: Will report anxiety at manageable levels  Description: INTERVENTIONS:  1. Administer medication as ordered  2. Teach and rehearse alternative coping skills  3. Provide emotional support with 1:1 interaction with staff  Flowsheets (Taken 8/3/2024 2127 by Jovan Mahan, RN)  Will report anxiety at manageable levels:   Administer medication as ordered   Teach and rehearse alternative coping skills   Pt. is visible in the milieu. He remain 1:1 level of care. He offers no complaint. He is free from falls, self harm or harming others.  
  Problem: Self Harm/Suicidality  Goal: Will have no self-injury during hospital stay  Description: INTERVENTIONS:  1.  Ensure constant observer at bedside with Q15M safety checks  2.  Maintain a safe environment  3.  Secure patient belongings  4.  Ensure family/visitors adhere to safety recommendations  5.  Ensure safety tray has been added to patient's diet order  6.  Every shift and PRN: Re-assess suicidal risk via Frequent Screener    9/12/2024 2006 by Kellen Moffett RN  Flowsheets (Taken 9/10/2024 2111 by Riri Lutz, RN)  Will have no self-injury during hospital stay:   Maintain a safe environment   Ensure constant observer at bedside with Q15M safety checks  9/12/2024 1151 by Clara Cano, RN  Outcome: Progressing     Problem: Depression  Goal: Will be euthymic at discharge  Description: INTERVENTIONS:  1. Administer medication as ordered  2. Provide emotional support via 1:1 interaction with staff  3. Encourage involvement in milieu/groups/activities  4. Monitor for social isolation  9/12/2024 1151 by Clara Cano, RN  Outcome: Progressing   Pt. remain 1:1 level of care.he offers no complaint. He is free from falls, self harm or harming others.  
  Problem: Self Harm/Suicidality  Goal: Will have no self-injury during hospital stay  Description: INTERVENTIONS:  1.  Ensure constant observer at bedside with Q15M safety checks  2.  Maintain a safe environment  3.  Secure patient belongings  4.  Ensure family/visitors adhere to safety recommendations  5.  Ensure safety tray has been added to patient's diet order  6.  Every shift and PRN: Re-assess suicidal risk via Frequent Screener    9/13/2024 2029 by Kellen Moffett RN  Flowsheets (Taken 9/10/2024 2111 by Riri Lutz, RN)  Will have no self-injury during hospital stay:   Maintain a safe environment   Ensure constant observer at bedside with Q15M safety checks  9/13/2024 1247 by Zainab Garnica, RN  Outcome: Progressing     Problem: Depression  Goal: Will be euthymic at discharge  Description: INTERVENTIONS:  1. Administer medication as ordered  2. Provide emotional support via 1:1 interaction with staff  3. Encourage involvement in milieu/groups/activities  4. Monitor for social isolation  9/13/2024 1247 by Zainab Garnica, RN  Outcome: Progressing   Pt. Is withdrawn to his room. He has a flat affect with a sad mood. He offers no complaint. He is free from falls, self harm or harming others.  
  Problem: Self Harm/Suicidality  Goal: Will have no self-injury during hospital stay  Description: INTERVENTIONS:  1.  Ensure constant observer at bedside with Q15M safety checks  2.  Maintain a safe environment  3.  Secure patient belongings  4.  Ensure family/visitors adhere to safety recommendations  5.  Ensure safety tray has been added to patient's diet order  6.  Every shift and PRN: Re-assess suicidal risk via Frequent Screener    9/14/2024 2040 by Edna Sánchez, RN  Outcome: Progressing     2040- pt has been isolated to self in room.  Remains on line of sight for safety.  Denied si/hi/avh during time of assessment.  All needs assessed and met.  Will continue to monitor and support as needed.  
  Problem: Self Harm/Suicidality  Goal: Will have no self-injury during hospital stay  Description: INTERVENTIONS:  1.  Ensure constant observer at bedside with Q15M safety checks  2.  Maintain a safe environment  3.  Secure patient belongings  4.  Ensure family/visitors adhere to safety recommendations  5.  Ensure safety tray has been added to patient's diet order  6.  Every shift and PRN: Re-assess suicidal risk via Frequent Screener    9/15/2024 0933 by Zainab Garnica RN  Outcome: Progressing     Problem: Depression  Goal: Will be euthymic at discharge  Description: INTERVENTIONS:  1. Administer medication as ordered  2. Provide emotional support via 1:1 interaction with staff  3. Encourage involvement in milieu/groups/activities  4. Monitor for social isolation  Outcome: Progressing     Problem: Angelica  Goal: Will exhibit normal sleep and speech and no impulsivity  Description: INTERVENTIONS:  1. Administer medication as ordered  2. Set limits on impulsive behavior  3. Make attempts to decrease external stimuli as possible  Outcome: Progressing   Patient presents calm/cooperative and compliant with all phases of care.  Patient denies all.  Patient remains line of sight to ensure his safety.  Patient attended spiritual group this morning.  No behavioral issues noted.   
  Problem: Self Harm/Suicidality  Goal: Will have no self-injury during hospital stay  Description: INTERVENTIONS:  1.  Ensure constant observer at bedside with Q15M safety checks  2.  Maintain a safe environment  3.  Secure patient belongings  4.  Ensure family/visitors adhere to safety recommendations  5.  Ensure safety tray has been added to patient's diet order  6.  Every shift and PRN: Re-assess suicidal risk via Frequent Screener    9/15/2024 2050 by Edna Sánchez, RN  Outcome: Progressing     2050- pt has been isolated to self.  Remains on line of sight for safety.  Compliant with meds.  All needs assessed and met.  Will continue to monitor and support as needed.  
  Problem: Self Harm/Suicidality  Goal: Will have no self-injury during hospital stay  Description: INTERVENTIONS:  1.  Ensure constant observer at bedside with Q15M safety checks  2.  Maintain a safe environment  3.  Secure patient belongings  4.  Ensure family/visitors adhere to safety recommendations  5.  Ensure safety tray has been added to patient's diet order  6.  Every shift and PRN: Re-assess suicidal risk via Frequent Screener    9/17/2024 0018 by Riri Lutz, RN  Flowsheets (Taken 9/16/2024 2000)  Will have no self-injury during hospital stay: Maintain a safe environment     Problem: Depression  Goal: Will be euthymic at discharge  Description: INTERVENTIONS:  1. Administer medication as ordered  2. Provide emotional support via 1:1 interaction with staff  3. Encourage involvement in milieu/groups/activities  4. Monitor for social isolation  9/17/2024 0018 by Riri Lutz, RN  Outcome: Progressing     Pt received in dayroom, alert and oriented x4, pt cooperative and interacted well with staff. Pt on line of sight for safety. Pt presented with a blunted affect, goal-directed thought process and appearance is appropriate for climate. Pt denied any SI/HI and denied hallucinations. Pt eating, toileting and sleeping well; pt is compliant with medications. Pt verbalized that pt is feeling depressed 6/10 this evening. Stressed the importance of medication compliance and positive coping skills; encouraged pt to talk to staff in case pt feels anxious or agitated before the situation gets out of hand; pt nodded in understanding. No aggression noted at this time. Pt with active care plans in place.      
  Problem: Self Harm/Suicidality  Goal: Will have no self-injury during hospital stay  Description: INTERVENTIONS:  1.  Ensure constant observer at bedside with Q15M safety checks  2.  Maintain a safe environment  3.  Secure patient belongings  4.  Ensure family/visitors adhere to safety recommendations  5.  Ensure safety tray has been added to patient's diet order  6.  Every shift and PRN: Re-assess suicidal risk via Frequent Screener    9/17/2024 1526 by Clara Cano RN  Outcome: Progressing  9/17/2024 1526 by Clara Cano RN  Outcome: Progressing     Problem: Depression  Goal: Will be euthymic at discharge  Description: INTERVENTIONS:  1. Administer medication as ordered  2. Provide emotional support via 1:1 interaction with staff  3. Encourage involvement in milieu/groups/activities  4. Monitor for social isolation  9/17/2024 1526 by Clara Cano RN  Outcome: Progressing  9/17/2024 1526 by Clara Cano RN  Outcome: Progressing     Problem: Angelica  Goal: Will exhibit normal sleep and speech and no impulsivity  Description: INTERVENTIONS:  1. Administer medication as ordered  2. Set limits on impulsive behavior  3. Make attempts to decrease external stimuli as possible  Outcome: Progressing     Pt presents with dull affect, anxious mood, circumstantial thought process. Pt has been selectively social on the unit, and attends unit groups. Pt displays medication and attention-seeking behaviors. Pt denies SI/HI/AVH. Pt is medication compliant.   
  Problem: Self Harm/Suicidality  Goal: Will have no self-injury during hospital stay  Description: INTERVENTIONS:  1.  Ensure constant observer at bedside with Q15M safety checks  2.  Maintain a safe environment  3.  Secure patient belongings  4.  Ensure family/visitors adhere to safety recommendations  5.  Ensure safety tray has been added to patient's diet order  6.  Every shift and PRN: Re-assess suicidal risk via Frequent Screener    9/17/2024 2009 by Kellen Moffett RN  Flowsheets (Taken 9/16/2024 2000 by Riri Lutz, RN)  Will have no self-injury during hospital stay: Maintain a safe environment  9/17/2024 1526 by Clara Cano RN  Outcome: Progressing  9/17/2024 1526 by Clara Cano RN  Outcome: Progressing     Problem: Depression  Goal: Will be euthymic at discharge  Description: INTERVENTIONS:  1. Administer medication as ordered  2. Provide emotional support via 1:1 interaction with staff  3. Encourage involvement in milieu/groups/activities  4. Monitor for social isolation  9/17/2024 1526 by Clara Cano RN  Outcome: Progressing  9/17/2024 1526 by Clara Cano RN  Outcome: Progressing   Pt. remain LOS. He is withdrawn with flat affect. He is pleasant and compliant. He is free from falls, self harm or harming others.  
  Problem: Self Harm/Suicidality  Goal: Will have no self-injury during hospital stay  Description: INTERVENTIONS:  1.  Ensure constant observer at bedside with Q15M safety checks  2.  Maintain a safe environment  3.  Secure patient belongings  4.  Ensure family/visitors adhere to safety recommendations  5.  Ensure safety tray has been added to patient's diet order  6.  Every shift and PRN: Re-assess suicidal risk via Frequent Screener    9/19/2024 0932 by Catherine Mcnally, RN  Outcome: Progressing     Problem: Depression  Goal: Will be euthymic at discharge  Description: INTERVENTIONS:  1. Administer medication as ordered  2. Provide emotional support via 1:1 interaction with staff  3. Encourage involvement in milieu/groups/activities  4. Monitor for social isolation  9/19/2024 0932 by Catherine Mcnally, RN  Outcome: Progressing     Problem: Angelica  Goal: Will exhibit normal sleep and speech and no impulsivity  Description: INTERVENTIONS:  1. Administer medication as ordered  2. Set limits on impulsive behavior  3. Make attempts to decrease external stimuli as possible  Outcome: Progressing   Pt presents with calm and relaxed affect, stable mood at this time, with linear thought process. Pt has been isolatedto self on the unit. Pt displays appropriate boundaries on the unit, adhering to unit guidelines. Pt reports he slept well and his appetite good. Pt appears clean and appropriately dressed. Pt denies SI/HI/AVH. Pt is medication compliant.   
  Problem: Self Harm/Suicidality  Goal: Will have no self-injury during hospital stay  Description: INTERVENTIONS:  1.  Ensure constant observer at bedside with Q15M safety checks  2.  Maintain a safe environment  3.  Secure patient belongings  4.  Ensure family/visitors adhere to safety recommendations  5.  Ensure safety tray has been added to patient's diet order  6.  Every shift and PRN: Re-assess suicidal risk via Frequent Screener    9/19/2024 2044 by Edna Sánchez, RN  Outcome: Progressing     2044- pt has been isolated to self.  Pleasant upon approach.  Remains on line of sight while awake for safety.  Denied si/hi/avh during assessment, but endorsed depression.  Will continue to monitor and provide support as needed.   
  Problem: Self Harm/Suicidality  Goal: Will have no self-injury during hospital stay  Description: INTERVENTIONS:  1.  Ensure constant observer at bedside with Q15M safety checks  2.  Maintain a safe environment  3.  Secure patient belongings  4.  Ensure family/visitors adhere to safety recommendations  5.  Ensure safety tray has been added to patient's diet order  6.  Every shift and PRN: Re-assess suicidal risk via Frequent Screener    9/2/2024 0900 by Zainab Garnica, RN  Outcome: Progressing     Problem: Depression  Goal: Will be euthymic at discharge  Description: INTERVENTIONS:  1. Administer medication as ordered  2. Provide emotional support via 1:1 interaction with staff  3. Encourage involvement in milieu/groups/activities  4. Monitor for social isolation  9/2/2024 0900 by Zainab Garnica, RN  Outcome: Progressing     Problem: Psychosis  Goal: Will report no hallucinations or delusions  Description: INTERVENTIONS:  1. Administer medication as  ordered  2. Assist with reality testing to support increasing orientation  3. Assess if patient's hallucinations or delusions are encouraging self harm or harm to others and intervene as appropriate  Outcome: Progressing   Patient presents calm/cooperative and compliant with care.  Patient denies SI/HI and AVH.  No complaints of pain. Patient behaviors and dialogue are appropriate this morning.  Patient has shown no evidence of agitation or aggression.  He remains 1:1 to ensure his safety.    
  Problem: Self Harm/Suicidality  Goal: Will have no self-injury during hospital stay  Description: INTERVENTIONS:  1.  Ensure constant observer at bedside with Q15M safety checks  2.  Maintain a safe environment  3.  Secure patient belongings  4.  Ensure family/visitors adhere to safety recommendations  5.  Ensure safety tray has been added to patient's diet order  6.  Every shift and PRN: Re-assess suicidal risk via Frequent Screener    9/2/2024 1945 by Kellen Moffett RN  Flowsheets (Taken 8/29/2024 2206)  Will have no self-injury during hospital stay:   Ensure constant observer at bedside with Q15M safety checks   Secure patient belongings   Ensure safety tray has been added to patient's diet order   Every shift and PRN: Re-assess suicidal risk via Frequent Screener  9/2/2024 0900 by Zainab Garnica RN  Outcome: Progressing     Problem: Depression  Goal: Will be euthymic at discharge  Description: INTERVENTIONS:  1. Administer medication as ordered  2. Provide emotional support via 1:1 interaction with staff  3. Encourage involvement in milieu/groups/activities  4. Monitor for social isolation  9/2/2024 0900 by Zainab Garnica RN  Outcome: Progressing     Problem: Psychosis  Goal: Will report no hallucinations or delusions  Description: INTERVENTIONS:  1. Administer medication as  ordered  2. Assist with reality testing to support increasing orientation  3. Assess if patient's hallucinations or delusions are encouraging self harm or harm to others and intervene as appropriate  9/2/2024 0900 by Zainab Garnica RN  Outcome: Progressing   Pt. remain 1;1 level of care. He has a brighter mood this evening. He denies any complaint. He is free from falls, self harm or harming others.  
  Problem: Self Harm/Suicidality  Goal: Will have no self-injury during hospital stay  Description: INTERVENTIONS:  1.  Ensure constant observer at bedside with Q15M safety checks  2.  Maintain a safe environment  3.  Secure patient belongings  4.  Ensure family/visitors adhere to safety recommendations  5.  Ensure safety tray has been added to patient's diet order  6.  Every shift and PRN: Re-assess suicidal risk via Frequent Screener    9/20/2024 2219 by Edna Sánchez RN  Outcome: Progressing     Problem: Abuse/Neglect  Goal: Pt/Caregiver aware of resources to assist with issues of abuse and neglect  Description: INTERVENTIONS:  1. Assess for level of risk and safety  2. Initiate referral to Social Work and notify Licensed Independent Practictioner (LIP)  3. Provide appropriate education and resources to patient and/or family  4. Initiate referral to Adult Protective Services, as appropriate  5. Initiate referral to Child Protective Services, as appropriate  6. Offer to have the patient's the patient's chart marked as Non-disclosed/Privacy patient for phone inquiries, as appropriate  7. Provide emotional support, including active listening and acknowledgment of concerns  Outcome: Progressing     Problem: Spiritual Care  Goal: Pt/Family able to move forward in process of forgiving self, others, and/or higher power  Description: INTERVENTIONS:  1. Assist patient/family to overcome blocks to healing by use of spiritual practices (prayer, meditation, guided imagery, reiki, breath work, etc).  2. De-myth guilt and help patient/family identify possible irrational spiritual/cultural beliefs and values.  3. Explore possibilities of making amends & reconciliation with self, others, and/or a greater power.  4. Guide patient/family in identifying painful feelings of guilt.  5. Help patient/famiy explore and identify spiritual beliefs, cultural understandings or values that may help or hinder letting go of issue.  6. Help 
  Problem: Self Harm/Suicidality  Goal: Will have no self-injury during hospital stay  Description: INTERVENTIONS:  1.  Ensure constant observer at bedside with Q15M safety checks  2.  Maintain a safe environment  3.  Secure patient belongings  4.  Ensure family/visitors adhere to safety recommendations  5.  Ensure safety tray has been added to patient's diet order  6.  Every shift and PRN: Re-assess suicidal risk via Frequent Screener    9/23/2024 2137 by Kellen Moffett RN  Flowsheets (Taken 9/16/2024 2000 by Riri Lutz, RN)  Will have no self-injury during hospital stay: Maintain a safe environment  9/23/2024 2132 by Kellen Moffett RN  Flowsheets (Taken 9/16/2024 2000 by Riri Lutz, RN)  Will have no self-injury during hospital stay: Maintain a safe environment     Problem: Depression  Goal: Will be euthymic at discharge  Description: INTERVENTIONS:  1. Administer medication as ordered  2. Provide emotional support via 1:1 interaction with staff  3. Encourage involvement in milieu/groups/activities  4. Monitor for social isolation  9/23/2024 0957 by Anika Ford RN  Outcome: Progressing     Problem: Anxiety  Goal: Will report anxiety at manageable levels  Description: INTERVENTIONS:  1. Administer medication as ordered  2. Teach and rehearse alternative coping skills  3. Provide emotional support with 1:1 interaction with staff  9/23/2024 0957 by Anika Ford RN  Outcome: Not Progressing   Pt. is visible in the milieu. He has a bright mood this evening. He offers no complaint.he is free from falls, self harm or harming others.  
  Problem: Self Harm/Suicidality  Goal: Will have no self-injury during hospital stay  Description: INTERVENTIONS:  1.  Ensure constant observer at bedside with Q15M safety checks  2.  Maintain a safe environment  3.  Secure patient belongings  4.  Ensure family/visitors adhere to safety recommendations  5.  Ensure safety tray has been added to patient's diet order  6.  Every shift and PRN: Re-assess suicidal risk via Frequent Screener    9/27/2024 2340 by Stephon Sotelo RN  Outcome: Progressing  9/27/2024 1338 by Zainab Garnica RN  Outcome: Progressing     Problem: Depression  Goal: Will be euthymic at discharge  Description: INTERVENTIONS:  1. Administer medication as ordered  2. Provide emotional support via 1:1 interaction with staff  3. Encourage involvement in milieu/groups/activities  4. Monitor for social isolation  9/27/2024 1338 by Zainab Garnica RN  Outcome: Progressing     Problem: Angelica  Goal: Will exhibit normal sleep and speech and no impulsivity  Description: INTERVENTIONS:  1. Administer medication as ordered  2. Set limits on impulsive behavior  3. Make attempts to decrease external stimuli as possible  9/27/2024 2340 by Stephon Sotelo, RN  Outcome: Progressing  9/27/2024 1338 by Zainab Garnica RN  Outcome: Progressing     Problem: Psychosis  Goal: Will report no hallucinations or delusions  Description: INTERVENTIONS:  1. Administer medication as  ordered  2. Assist with reality testing to support increasing orientation  3. Assess if patient's hallucinations or delusions are encouraging self harm or harm to others and intervene as appropriate  Outcome: Progressing       Pt has been cooperative this shift and did not display and self injurious behaviors.  Denies si/hi and avh.  Was compliant w/ hs scheduled medications and ate hs snack.  Will continue to monitor/support  
  Problem: Self Harm/Suicidality  Goal: Will have no self-injury during hospital stay  Description: INTERVENTIONS:  1.  Ensure constant observer at bedside with Q15M safety checks  2.  Maintain a safe environment  3.  Secure patient belongings  4.  Ensure family/visitors adhere to safety recommendations  5.  Ensure safety tray has been added to patient's diet order  6.  Every shift and PRN: Re-assess suicidal risk via Frequent Screener    9/28/2024 0826 by Teresa Hilario RN  Outcome: Progressing  9/27/2024 2340 by Stephon Sotelo RN  Outcome: Progressing     Problem: Depression  Goal: Will be euthymic at discharge  Description: INTERVENTIONS:  1. Administer medication as ordered  2. Provide emotional support via 1:1 interaction with staff  3. Encourage involvement in milieu/groups/activities  4. Monitor for social isolation  Outcome: Progressing     Problem: Angelica  Goal: Will exhibit normal sleep and speech and no impulsivity  Description: INTERVENTIONS:  1. Administer medication as ordered  2. Set limits on impulsive behavior  3. Make attempts to decrease external stimuli as possible  9/28/2024 0826 by Teresa Hilario RN  Outcome: Progressing  9/27/2024 2340 by Stephon Sotelo, RN  Outcome: Progressing     Problem: Psychosis  Goal: Will report no hallucinations or delusions  Description: INTERVENTIONS:  1. Administer medication as  ordered  2. Assist with reality testing to support increasing orientation  3. Assess if patient's hallucinations or delusions are encouraging self harm or harm to others and intervene as appropriate  9/27/2024 2340 by Stephon Sotelo, RN  Outcome: Progressing    Patient isolative. Patient depressed mood with anxious affect. Patient meal and medication compliant. Patient denies pain, SI, HI, AVH but endorses mild depression and anxiety. Patient VS stable. Will continue to monitor for ongoing care.      
  Problem: Self Harm/Suicidality  Goal: Will have no self-injury during hospital stay  Description: INTERVENTIONS:  1.  Ensure constant observer at bedside with Q15M safety checks  2.  Maintain a safe environment  3.  Secure patient belongings  4.  Ensure family/visitors adhere to safety recommendations  5.  Ensure safety tray has been added to patient's diet order  6.  Every shift and PRN: Re-assess suicidal risk via Frequent Screener    9/28/2024 2017 by Edna Sánchez RN  Outcome: Progressing     2017- pt has been in day area quiet, but interacting with peers appropriately.  Pt reported that he had a visit with his wife that appeared to have gone rather well.  Denied si/hi/avh during time of assessment.  Remains compliant with meds.  All needs assessed and met.  Will continue to monitor and support as needed.   
  Problem: Self Harm/Suicidality  Goal: Will have no self-injury during hospital stay  Description: INTERVENTIONS:  1.  Ensure constant observer at bedside with Q15M safety checks  2.  Maintain a safe environment  3.  Secure patient belongings  4.  Ensure family/visitors adhere to safety recommendations  5.  Ensure safety tray has been added to patient's diet order  6.  Every shift and PRN: Re-assess suicidal risk via Frequent Screener    9/29/2024 2055 by Edna Sánchez, RN  Outcome: Progressing     2055- pt has been isolated to self in day area.  Very flat/depressed affect.  Compliant with meds.  Will continue to monitor and support as needed.  
  Problem: Self Harm/Suicidality  Goal: Will have no self-injury during hospital stay  Description: INTERVENTIONS:  1.  Ensure constant observer at bedside with Q15M safety checks  2.  Maintain a safe environment  3.  Secure patient belongings  4.  Ensure family/visitors adhere to safety recommendations  5.  Ensure safety tray has been added to patient's diet order  6.  Every shift and PRN: Re-assess suicidal risk via Frequent Screener    9/3/2024 0927 by Anika Ford RN  Outcome: Progressing     Problem: Behavior  Goal: Pt/Family maintain appropriate behavior and adhere to behavioral management agreement, if implemented  Description: INTERVENTIONS:  1. Assess patient/family's coping skills and  non-compliant behavior (including use of illegal substances)  2. Notify security of behavior or suspected illegal substances which indicate the need for search of the family and/or belongings  3. Encourage verbalization of thoughts and concerns in a socially appropriate manner  4. Utilize positive, consistent limit setting strategies supporting safety of patient, staff and others  5. Encourage participation in the decision making process about the behavioral management agreement  6. If a visitor's behavior poses a threat to safety call refer to organization policy.  7. Initiate consult with , Psychosocial CNS, Spiritual Care as appropriate  Outcome: Progressing     Problem: Anxiety  Goal: Will report anxiety at manageable levels  Description: INTERVENTIONS:  1. Administer medication as ordered  2. Teach and rehearse alternative coping skills  3. Provide emotional support with 1:1 interaction with staff  Outcome: Progressing    Pt alert & oriented x 3 and able to make needs known. Pt cooperative and pleasant. Affect is flat. Patient denies feelings of anxiousness and depression. Pt denies thoughts of SI/HI, & hallucinations. Pt tolerated medications well. Active care plan in place.      
  Problem: Self Harm/Suicidality  Goal: Will have no self-injury during hospital stay  Description: INTERVENTIONS:  1.  Ensure constant observer at bedside with Q15M safety checks  2.  Maintain a safe environment  3.  Secure patient belongings  4.  Ensure family/visitors adhere to safety recommendations  5.  Ensure safety tray has been added to patient's diet order  6.  Every shift and PRN: Re-assess suicidal risk via Frequent Screener    9/3/2024 2001 by Kellen Moffett RN  Flowsheets (Taken 8/29/2024 2206)  Will have no self-injury during hospital stay:   Ensure constant observer at bedside with Q15M safety checks   Secure patient belongings   Ensure safety tray has been added to patient's diet order   Every shift and PRN: Re-assess suicidal risk via Frequent Screener  9/3/2024 0927 by Anika Ford, RN  Outcome: Progressing     Problem: Anxiety  Goal: Will report anxiety at manageable levels  Description: INTERVENTIONS:  1. Administer medication as ordered  2. Teach and rehearse alternative coping skills  3. Provide emotional support with 1:1 interaction with staff  9/3/2024 2001 by Kellen Moffett RN  Flowsheets (Taken 8/3/2024 2127 by Jovan Mahan RN)  Will report anxiety at manageable levels:   Administer medication as ordered   Teach and rehearse alternative coping skills  9/3/2024 0927 by Anika Ford RN  Outcome: Progressing   Pt. stays in his room most of the evening. He offers no complaint. He is free from falls, self harm or harming others.  
  Problem: Self Harm/Suicidality  Goal: Will have no self-injury during hospital stay  Description: INTERVENTIONS:  1.  Ensure constant observer at bedside with Q15M safety checks  2.  Maintain a safe environment  3.  Secure patient belongings  4.  Ensure family/visitors adhere to safety recommendations  5.  Ensure safety tray has been added to patient's diet order  6.  Every shift and PRN: Re-assess suicidal risk via Frequent Screener    9/4/2024 0833 by Heather Escalona, RN  Outcome: Progressing  9/3/2024 2001 by Kellen Moffett, RN  Flowsheets (Taken 8/29/2024 2206)  Will have no self-injury during hospital stay:   Ensure constant observer at bedside with Q15M safety checks   Secure patient belongings   Ensure safety tray has been added to patient's diet order   Every shift and PRN: Re-assess suicidal risk via Frequent Screener     Problem: Behavior  Goal: Pt/Family maintain appropriate behavior and adhere to behavioral management agreement, if implemented  Description: INTERVENTIONS:  1. Assess patient/family's coping skills and  non-compliant behavior (including use of illegal substances)  2. Notify security of behavior or suspected illegal substances which indicate the need for search of the family and/or belongings  3. Encourage verbalization of thoughts and concerns in a socially appropriate manner  4. Utilize positive, consistent limit setting strategies supporting safety of patient, staff and others  5. Encourage participation in the decision making process about the behavioral management agreement  6. If a visitor's behavior poses a threat to safety call refer to organization policy.  7. Initiate consult with , Psychosocial CNS, Spiritual Care as appropriate  Outcome: Progressing     Problem: Anxiety  Goal: Will report anxiety at manageable levels  Description: INTERVENTIONS:  1. Administer medication as ordered  2. Teach and rehearse alternative coping skills  3. Provide emotional support 
  Problem: Self Harm/Suicidality  Goal: Will have no self-injury during hospital stay  Description: INTERVENTIONS:  1.  Ensure constant observer at bedside with Q15M safety checks  2.  Maintain a safe environment  3.  Secure patient belongings  4.  Ensure family/visitors adhere to safety recommendations  5.  Ensure safety tray has been added to patient's diet order  6.  Every shift and PRN: Re-assess suicidal risk via Frequent Screener    9/5/2024 0805 by Heather Escalona RN  Outcome: Progressing  9/5/2024 0036 by Stephon Sotelo RN  Outcome: Progressing     Problem: Behavior  Goal: Pt/Family maintain appropriate behavior and adhere to behavioral management agreement, if implemented  Description: INTERVENTIONS:  1. Assess patient/family's coping skills and  non-compliant behavior (including use of illegal substances)  2. Notify security of behavior or suspected illegal substances which indicate the need for search of the family and/or belongings  3. Encourage verbalization of thoughts and concerns in a socially appropriate manner  4. Utilize positive, consistent limit setting strategies supporting safety of patient, staff and others  5. Encourage participation in the decision making process about the behavioral management agreement  6. If a visitor's behavior poses a threat to safety call refer to organization policy.  7. Initiate consult with , Psychosocial CNS, Spiritual Care as appropriate  9/5/2024 0805 by Haether Escalona RN  Outcome: Progressing  9/5/2024 0036 by Stephon Sotelo RN  Outcome: Progressing     Problem: Anxiety  Goal: Will report anxiety at manageable levels  Description: INTERVENTIONS:  1. Administer medication as ordered  2. Teach and rehearse alternative coping skills  3. Provide emotional support with 1:1 interaction with staff  Outcome: Not Progressing     Problem: Anxiety  Goal: Will report anxiety at manageable levels  Description: INTERVENTIONS:  1. Administer medication as 
  Problem: Self Harm/Suicidality  Goal: Will have no self-injury during hospital stay  Description: INTERVENTIONS:  1.  Ensure constant observer at bedside with Q15M safety checks  2.  Maintain a safe environment  3.  Secure patient belongings  4.  Ensure family/visitors adhere to safety recommendations  5.  Ensure safety tray has been added to patient's diet order  6.  Every shift and PRN: Re-assess suicidal risk via Frequent Screener    9/6/2024 2018 by Edna Sánchez RN  Outcome: Progressing     2018- pt has been isolated to self in day area.  Remains on 1:1 for safety.  No self harming behaviors noted.  Remains compliant with meds.  Endorsed si, but denied hi/avh during time of assessment.  All needs assessed and met.  Will continue to monitor and support as needed.   
  Problem: Self Harm/Suicidality  Goal: Will have no self-injury during hospital stay  Description: INTERVENTIONS:  1.  Ensure constant observer at bedside with Q15M safety checks  2.  Maintain a safe environment  3.  Secure patient belongings  4.  Ensure family/visitors adhere to safety recommendations  5.  Ensure safety tray has been added to patient's diet order  6.  Every shift and PRN: Re-assess suicidal risk via Frequent Screener    9/7/2024 2110 by Kellen Moffett RN  Flowsheets (Taken 8/29/2024 2206)  Will have no self-injury during hospital stay:   Ensure constant observer at bedside with Q15M safety checks   Secure patient belongings   Ensure safety tray has been added to patient's diet order   Every shift and PRN: Re-assess suicidal risk via Frequent Screener  9/7/2024 1427 by Anika Ford, RN  Outcome: Progressing     Problem: Anxiety  Goal: Will report anxiety at manageable levels  Description: INTERVENTIONS:  1. Administer medication as ordered  2. Teach and rehearse alternative coping skills  3. Provide emotional support with 1:1 interaction with staff  Flowsheets (Taken 8/3/2024 2127 by Jovan Mahan, RN)  Will report anxiety at manageable levels:   Administer medication as ordered   Teach and rehearse alternative coping skills   Pt. Is visible in the milieu. He has a bright mood this evening. No anxiety or any  behavioral issues noted. He offers no complaint. He is free from falls, self harm or harming others.  
  Problem: Self Harm/Suicidality  Goal: Will have no self-injury during hospital stay  Description: INTERVENTIONS:  1.  Ensure constant observer at bedside with Q15M safety checks  2.  Maintain a safe environment  3.  Secure patient belongings  4.  Ensure family/visitors adhere to safety recommendations  5.  Ensure safety tray has been added to patient's diet order  6.  Every shift and PRN: Re-assess suicidal risk via Frequent Screener    9/8/2024 2023 by Kellen Moffett RN  Flowsheets (Taken 8/29/2024 2206)  Will have no self-injury during hospital stay:   Ensure constant observer at bedside with Q15M safety checks   Secure patient belongings   Ensure safety tray has been added to patient's diet order   Every shift and PRN: Re-assess suicidal risk via Frequent Screener  9/8/2024 0935 by Clara Cano RN  Outcome: Progressing     Problem: Depression  Goal: Will be euthymic at discharge  Description: INTERVENTIONS:  1. Administer medication as ordered  2. Provide emotional support via 1:1 interaction with staff  3. Encourage involvement in milieu/groups/activities  4. Monitor for social isolation  9/8/2024 0935 by Clara Cano, RN  Outcome: Progressing     Problem: Anxiety  Goal: Will report anxiety at manageable levels  Description: INTERVENTIONS:  1. Administer medication as ordered  2. Teach and rehearse alternative coping skills  3. Provide emotional support with 1:1 interaction with staff  Flowsheets (Taken 8/3/2024 2127 by Jovan Mahan, RN)  Will report anxiety at manageable levels:   Administer medication as ordered   Teach and rehearse alternative coping skills   Pt. remain 1:1 level of care. He is visible in the milieu but he does not interact with his peers. No anxiety attack or any behavioral issues noted. He is free from falls, self harm or harming others.  
  Problem: Self Harm/Suicidality  Goal: Will have no self-injury during hospital stay  Description: INTERVENTIONS:  1.  Ensure constant observer at bedside with Q15M safety checks  2.  Maintain a safe environment  3.  Secure patient belongings  4.  Ensure family/visitors adhere to safety recommendations  5.  Ensure safety tray has been added to patient's diet order  6.  Every shift and PRN: Re-assess suicidal risk via Frequent Screener    Flowsheets (Taken 8/29/2024 2206)  Will have no self-injury during hospital stay:   Ensure constant observer at bedside with Q15M safety checks   Secure patient belongings   Ensure safety tray has been added to patient's diet order   Every shift and PRN: Re-assess suicidal risk via Frequent Screener     Problem: Anxiety  Goal: Will report anxiety at manageable levels  Description: INTERVENTIONS:  1. Administer medication as ordered  2. Teach and rehearse alternative coping skills  3. Provide emotional support with 1:1 interaction with staff  Flowsheets (Taken 8/3/2024 2127 by Jovan Mahan RN)  Will report anxiety at manageable levels:   Administer medication as ordered   Teach and rehearse alternative coping skills     
  Problem: Self Harm/Suicidality  Goal: Will have no self-injury during hospital stay  Description: INTERVENTIONS:  1.  Ensure constant observer at bedside with Q15M safety checks  2.  Maintain a safe environment  3.  Secure patient belongings  4.  Ensure family/visitors adhere to safety recommendations  5.  Ensure safety tray has been added to patient's diet order  6.  Every shift and PRN: Re-assess suicidal risk via Frequent Screener    Flowsheets (Taken 8/3/2024 2127 by Jovan Mahan RN)  Will have no self-injury during hospital stay:   Ensure constant observer at bedside with Q15M safety checks   Ensure family/visitors adhere to safety recommendations   Every shift and PRN: Re-assess suicidal risk via Frequent Screener   Maintain a safe environment   Ensure safety tray has been added to patient's diet order   Secure patient belongings     
  Problem: Self Harm/Suicidality  Goal: Will have no self-injury during hospital stay  Description: INTERVENTIONS:  1.  Ensure constant observer at bedside with Q15M safety checks  2.  Maintain a safe environment  3.  Secure patient belongings  4.  Ensure family/visitors adhere to safety recommendations  5.  Ensure safety tray has been added to patient's diet order  6.  Every shift and PRN: Re-assess suicidal risk via Frequent Screener    Flowsheets (Taken 8/3/2024 2127 by Jovan Mahan RN)  Will have no self-injury during hospital stay:   Ensure constant observer at bedside with Q15M safety checks   Ensure family/visitors adhere to safety recommendations   Every shift and PRN: Re-assess suicidal risk via Frequent Screener   Maintain a safe environment   Ensure safety tray has been added to patient's diet order   Secure patient belongings     Problem: Depression  Goal: Will be euthymic at discharge  Description: INTERVENTIONS:  1. Administer medication as ordered  2. Provide emotional support via 1:1 interaction with staff  3. Encourage involvement in milieu/groups/activities  4. Monitor for social isolation  8/16/2024 1449 by Aislinn Urrutia RN  Outcome: Not Progressing     Problem: Anxiety  Goal: Will report anxiety at manageable levels  Description: INTERVENTIONS:  1. Administer medication as ordered  2. Teach and rehearse alternative coping skills  3. Provide emotional support with 1:1 interaction with staff  8/16/2024 2039 by Kellen Moffett RN  Flowsheets (Taken 8/3/2024 2127 by Jovan Mahan RN)  Will report anxiety at manageable levels:   Administer medication as ordered   Teach and rehearse alternative coping skills  8/16/2024 1449 by Aislinn Urrutia RN  Outcome: Not Progressing     Problem: Depression  Goal: Will be euthymic at discharge  Description: INTERVENTIONS:  1. Administer medication as ordered  2. Provide emotional support via 1:1 interaction with staff  3. Encourage involvement 
  Problem: Self Harm/Suicidality  Goal: Will have no self-injury during hospital stay  Description: INTERVENTIONS:  1.  Ensure constant observer at bedside with Q15M safety checks  2.  Maintain a safe environment  3.  Secure patient belongings  4.  Ensure family/visitors adhere to safety recommendations  5.  Ensure safety tray has been added to patient's diet order  6.  Every shift and PRN: Re-assess suicidal risk via Frequent Screener    Flowsheets (Taken 9/16/2024 2000 by Riri Lutz RN)  Will have no self-injury during hospital stay: Maintain a safe environment   Pt. remain line of sight. He is pleasant and compliant. No behavioral issues noted. He is free from falls, self harm or harming others.  
  Problem: Self Harm/Suicidality  Goal: Will have no self-injury during hospital stay  Description: INTERVENTIONS:  1.  Ensure constant observer at bedside with Q15M safety checks  2.  Maintain a safe environment  3.  Secure patient belongings  4.  Ensure family/visitors adhere to safety recommendations  5.  Ensure safety tray has been added to patient's diet order  6.  Every shift and PRN: Re-assess suicidal risk via Frequent Screener    Flowsheets (Taken 9/16/2024 2000 by Riri Lutz, RN)  Will have no self-injury during hospital stay: Maintain a safe environment     Problem: Anxiety  Goal: Will report anxiety at manageable levels  Description: INTERVENTIONS:  1. Administer medication as ordered  2. Teach and rehearse alternative coping skills  3. Provide emotional support with 1:1 interaction with staff  Flowsheets (Taken 8/3/2024 2127 by Jovan Mahan, RN)  Will report anxiety at manageable levels:   Administer medication as ordered   Teach and rehearse alternative coping skills   Pt. is visible in the milieu. He offers no complaint. He is free from falls, self harm or harming others.  
  Problem: Self Harm/Suicidality  Goal: Will have no self-injury during hospital stay  Description: INTERVENTIONS:  1.  Ensure constant observer at bedside with Q15M safety checks  2.  Maintain a safe environment  3.  Secure patient belongings  4.  Ensure family/visitors adhere to safety recommendations  5.  Ensure safety tray has been added to patient's diet order  6.  Every shift and PRN: Re-assess suicidal risk via Frequent Screener    Outcome: Progressing     2031-pt has been isolated to self in room.  Remains on 1:1 for safety.  Endorsed si, but denied hi/avh during time of assessment.  Remains compliant with meds.  All needs assessed and met.  Will continue to monitor and support as needed.   
  Problem: Self Harm/Suicidality  Goal: Will have no self-injury during hospital stay  Description: INTERVENTIONS:  1.  Ensure constant observer at bedside with Q15M safety checks  2.  Maintain a safe environment  3.  Secure patient belongings  4.  Ensure family/visitors adhere to safety recommendations  5.  Ensure safety tray has been added to patient's diet order  6.  Every shift and PRN: Re-assess suicidal risk via Frequent Screener    Outcome: Progressing     2107- pt has been isolated to self in day area and room.  Pt with anxious affect.  Focused on prn med for anxiety.  Pt was educated on meds  being provided currently and encouraged to wait to see if meds are effective before receiving more.  Pt stated, \"I just feel rage in here (rubbing stomach) and I don't want it to go here (patting chest.)\"  Pt's emotions were validated and commended on keeping \"rage\" under control.  Will continue to monitor and support as needed.   
  Problem: Self Harm/Suicidality  Goal: Will have no self-injury during hospital stay  Description: INTERVENTIONS:  1.  Ensure constant observer at bedside with Q15M safety checks  2.  Maintain a safe environment  3.  Secure patient belongings  4.  Ensure family/visitors adhere to safety recommendations  5.  Ensure safety tray has been added to patient's diet order  6.  Every shift and PRN: Re-assess suicidal risk via Frequent Screener    Outcome: Progressing     2135- pt has been isolated to self in room.  Remains on line of sight.  Pt denied si/hi/avh during time of assessment.  Compliant with meds.  All needs assessed and met.  Will continue to monitor and support as needed.   
  Problem: Self Harm/Suicidality  Goal: Will have no self-injury during hospital stay  Description: INTERVENTIONS:  1.  Ensure constant observer at bedside with Q15M safety checks  2.  Maintain a safe environment  3.  Secure patient belongings  4.  Ensure family/visitors adhere to safety recommendations  5.  Ensure safety tray has been added to patient's diet order  6.  Every shift and PRN: Re-assess suicidal risk via Frequent Screener    Outcome: Progressing     2219- pt has been isolated to self this shift.  Appears depressed.  Very pleasant upon approach.  Denied si/hi/avh during time of assessment.  Remained on line of sight while awake and no problems to report.  All needs assessed and met.  Will continue to monitor and support as needed.   
  Problem: Self Harm/Suicidality  Goal: Will have no self-injury during hospital stay  Description: INTERVENTIONS:  1.  Ensure constant observer at bedside with Q15M safety checks  2.  Maintain a safe environment  3.  Secure patient belongings  4.  Ensure family/visitors adhere to safety recommendations  5.  Ensure safety tray has been added to patient's diet order  6.  Every shift and PRN: Re-assess suicidal risk via Frequent Screener    Outcome: Progressing     Problem: Angelica  Goal: Will exhibit normal sleep and speech and no impulsivity  Description: INTERVENTIONS:  1. Administer medication as ordered  2. Set limits on impulsive behavior  3. Make attempts to decrease external stimuli as possible  Outcome: Not Progressing     Problem: Anxiety  Goal: Will report anxiety at manageable levels  Description: INTERVENTIONS:  1. Administer medication as ordered  2. Teach and rehearse alternative coping skills  3. Provide emotional support with 1:1 interaction with staff  Outcome: Not Progressing    Pt has been friendly, cooperative and medication compliant. Pt presents worried facial expressions, congruent affect and an anxious mood. Pt denies SI/HI/AVH but reports having feelings of anxiety (8/10) caused by \"all my issues. Pt showed mild signs of angelica when pacing the halls and had signs of pressured speech. Pt also reported having trouble sleeping, 50 mg Trazodone was administered. @1930, pt approached RN stating \"I think I am about to lose it, I can't stop trembling and I am so anxious.\" RN sat down and spoke with pt and calmed him down, pt seemed to respond positively. Pt is currently in room asleep, will continue to monitor for safety and comfort.      
  Problem: Self Harm/Suicidality  Goal: Will have no self-injury during hospital stay  Description: INTERVENTIONS:  1.  Ensure constant observer at bedside with Q15M safety checks  2.  Maintain a safe environment  3.  Secure patient belongings  4.  Ensure family/visitors adhere to safety recommendations  5.  Ensure safety tray has been added to patient's diet order  6.  Every shift and PRN: Re-assess suicidal risk via Frequent Screener    Outcome: Progressing     Problem: Angelica  Goal: Will exhibit normal sleep and speech and no impulsivity  Description: INTERVENTIONS:  1. Administer medication as ordered  2. Set limits on impulsive behavior  3. Make attempts to decrease external stimuli as possible  Outcome: Progressing     Problem: Behavior  Goal: Pt/Family maintain appropriate behavior and adhere to behavioral management agreement, if implemented  Description: INTERVENTIONS:  1. Assess patient/family's coping skills and  non-compliant behavior (including use of illegal substances)  2. Notify security of behavior or suspected illegal substances which indicate the need for search of the family and/or belongings  3. Encourage verbalization of thoughts and concerns in a socially appropriate manner  4. Utilize positive, consistent limit setting strategies supporting safety of patient, staff and others  5. Encourage participation in the decision making process about the behavioral management agreement  6. If a visitor's behavior poses a threat to safety call refer to organization policy.  7. Initiate consult with , Psychosocial CNS, Spiritual Care as appropriate  Outcome: Progressing       Pt has been cooperative this shift.  Denies si/hi and avh.  Was compliant w/ hs scheduled medications and ate hs snack.  Continues to present w/ depressed mood and endorses depression.  Will continue to monitor/support  
  Problem: Self Harm/Suicidality  Goal: Will have no self-injury during hospital stay  Description: INTERVENTIONS:  1.  Ensure constant observer at bedside with Q15M safety checks  2.  Maintain a safe environment  3.  Secure patient belongings  4.  Ensure family/visitors adhere to safety recommendations  5.  Ensure safety tray has been added to patient's diet order  6.  Every shift and PRN: Re-assess suicidal risk via Frequent Screener    Outcome: Progressing     Problem: Depression  Goal: Will be euthymic at discharge  Description: INTERVENTIONS:  1. Administer medication as ordered  2. Provide emotional support via 1:1 interaction with staff  3. Encourage involvement in milieu/groups/activities  4. Monitor for social isolation  9/18/2024 2234 by Stephon Sotelo, RN  Outcome: Progressing  9/18/2024 0919 by Heather Escalona, RN  Outcome: Not Progressing     Problem: Psychosis  Goal: Will report no hallucinations or delusions  Description: INTERVENTIONS:  1. Administer medication as  ordered  2. Assist with reality testing to support increasing orientation  3. Assess if patient's hallucinations or delusions are encouraging self harm or harm to others and intervene as appropriate  Outcome: Progressing     Problem: Behavior  Goal: Pt/Family maintain appropriate behavior and adhere to behavioral management agreement, if implemented  Description: INTERVENTIONS:  1. Assess patient/family's coping skills and  non-compliant behavior (including use of illegal substances)  2. Notify security of behavior or suspected illegal substances which indicate the need for search of the family and/or belongings  3. Encourage verbalization of thoughts and concerns in a socially appropriate manner  4. Utilize positive, consistent limit setting strategies supporting safety of patient, staff and others  5. Encourage participation in the decision making process about the behavioral management agreement  6. If a visitor's behavior poses a threat 
  Problem: Self Harm/Suicidality  Goal: Will have no self-injury during hospital stay  Description: INTERVENTIONS:  1.  Ensure constant observer at bedside with Q15M safety checks  2.  Maintain a safe environment  3.  Secure patient belongings  4.  Ensure family/visitors adhere to safety recommendations  5.  Ensure safety tray has been added to patient's diet order  6.  Every shift and PRN: Re-assess suicidal risk via Frequent Screener    Outcome: Progressing     Problem: Depression  Goal: Will be euthymic at discharge  Description: INTERVENTIONS:  1. Administer medication as ordered  2. Provide emotional support via 1:1 interaction with staff  3. Encourage involvement in milieu/groups/activities  4. Monitor for social isolation  9/25/2024 2312 by Oliver Ang RN  Outcome: Progressing     Problem: Behavior  Goal: Pt/Family maintain appropriate behavior and adhere to behavioral management agreement, if implemented  Description: INTERVENTIONS:  1. Assess patient/family's coping skills and  non-compliant behavior (including use of illegal substances)  2. Notify security of behavior or suspected illegal substances which indicate the need for search of the family and/or belongings  3. Encourage verbalization of thoughts and concerns in a socially appropriate manner  4. Utilize positive, consistent limit setting strategies supporting safety of patient, staff and others  5. Encourage participation in the decision making process about the behavioral management agreement  6. If a visitor's behavior poses a threat to safety call refer to organization policy.  7. Initiate consult with , Psychosocial CNS, Spiritual Care as appropriate  Outcome: Progressing     Problem: Anxiety  Goal: Will report anxiety at manageable levels  Description: INTERVENTIONS:  1. Administer medication as ordered  2. Teach and rehearse alternative coping skills  3. Provide emotional support with 1:1 interaction with staff  9/25/2024 
  Problem: Self Harm/Suicidality  Goal: Will have no self-injury during hospital stay  Description: INTERVENTIONS:  1.  Ensure constant observer at bedside with Q15M safety checks  2.  Maintain a safe environment  3.  Secure patient belongings  4.  Ensure family/visitors adhere to safety recommendations  5.  Ensure safety tray has been added to patient's diet order  6.  Every shift and PRN: Re-assess suicidal risk via Frequent Screener    Outcome: Progressing     Problem: Depression  Goal: Will be euthymic at discharge  Description: INTERVENTIONS:  1. Administer medication as ordered  2. Provide emotional support via 1:1 interaction with staff  3. Encourage involvement in milieu/groups/activities  4. Monitor for social isolation  Outcome: Progressing     Patient calm and cooperative this shift. Patient medication compliant. Patient endorses SI, depression and anxiety. Patient denies HI, AVH and pain. Patient mood is depressed with anxious affect. Patient active in treatment and groups. Will continue to monitor for ongoing care.   
  Problem: Self Harm/Suicidality  Goal: Will have no self-injury during hospital stay  Description: INTERVENTIONS:  1.  Ensure constant observer at bedside with Q15M safety checks  2.  Maintain a safe environment  3.  Secure patient belongings  4.  Ensure family/visitors adhere to safety recommendations  5.  Ensure safety tray has been added to patient's diet order  6.  Every shift and PRN: Re-assess suicidal risk via Frequent Screener    Outcome: Progressing     Problem: Depression  Goal: Will be euthymic at discharge  Description: INTERVENTIONS:  1. Administer medication as ordered  2. Provide emotional support via 1:1 interaction with staff  3. Encourage involvement in milieu/groups/activities  4. Monitor for social isolation  Outcome: Progressing     Problem: Angelica  Goal: Will exhibit normal sleep and speech and no impulsivity  Description: INTERVENTIONS:  1. Administer medication as ordered  2. Set limits on impulsive behavior  3. Make attempts to decrease external stimuli as possible  Outcome: Progressing     Problem: Psychosis  Goal: Will report no hallucinations or delusions  Description: INTERVENTIONS:  1. Administer medication as  ordered  2. Assist with reality testing to support increasing orientation  3. Assess if patient's hallucinations or delusions are encouraging self harm or harm to others and intervene as appropriate  Outcome: Progressing     Problem: Behavior  Goal: Pt/Family maintain appropriate behavior and adhere to behavioral management agreement, if implemented  Description: INTERVENTIONS:  1. Assess patient/family's coping skills and  non-compliant behavior (including use of illegal substances)  2. Notify security of behavior or suspected illegal substances which indicate the need for search of the family and/or belongings  3. Encourage verbalization of thoughts and concerns in a socially appropriate manner  4. Utilize positive, consistent limit setting strategies supporting safety of 
  Problem: Self Harm/Suicidality  Goal: Will have no self-injury during hospital stay  Description: INTERVENTIONS:  1.  Ensure constant observer at bedside with Q15M safety checks  2.  Maintain a safe environment  3.  Secure patient belongings  4.  Ensure family/visitors adhere to safety recommendations  5.  Ensure safety tray has been added to patient's diet order  6.  Every shift and PRN: Re-assess suicidal risk via Frequent Screener    Outcome: Progressing     Problem: Depression  Goal: Will be euthymic at discharge  Description: INTERVENTIONS:  1. Administer medication as ordered  2. Provide emotional support via 1:1 interaction with staff  3. Encourage involvement in milieu/groups/activities  4. Monitor for social isolation  Outcome: Progressing     Problem: Angelica  Goal: Will exhibit normal sleep and speech and no impulsivity  Description: INTERVENTIONS:  1. Administer medication as ordered  2. Set limits on impulsive behavior  3. Make attempts to decrease external stimuli as possible  Outcome: Progressing     Problem: Psychosis  Goal: Will report no hallucinations or delusions  Description: INTERVENTIONS:  1. Administer medication as  ordered  2. Assist with reality testing to support increasing orientation  3. Assess if patient's hallucinations or delusions are encouraging self harm or harm to others and intervene as appropriate  Outcome: Progressing     Problem: Behavior  Goal: Pt/Family maintain appropriate behavior and adhere to behavioral management agreement, if implemented  Description: INTERVENTIONS:  1. Assess patient/family's coping skills and  non-compliant behavior (including use of illegal substances)  2. Notify security of behavior or suspected illegal substances which indicate the need for search of the family and/or belongings  3. Encourage verbalization of thoughts and concerns in a socially appropriate manner  4. Utilize positive, consistent limit setting strategies supporting safety of 
  Problem: Self Harm/Suicidality  Goal: Will have no self-injury during hospital stay  Description: INTERVENTIONS:  1.  Ensure constant observer at bedside with Q15M safety checks  2.  Maintain a safe environment  3.  Secure patient belongings  4.  Ensure family/visitors adhere to safety recommendations  5.  Ensure safety tray has been added to patient's diet order  6.  Every shift and PRN: Re-assess suicidal risk via Frequent Screener    Outcome: Progressing     Problem: Depression  Goal: Will be euthymic at discharge  Description: INTERVENTIONS:  1. Administer medication as ordered  2. Provide emotional support via 1:1 interaction with staff  3. Encourage involvement in milieu/groups/activities  4. Monitor for social isolation  Outcome: Progressing     Problem: Angelica  Goal: Will exhibit normal sleep and speech and no impulsivity  Description: INTERVENTIONS:  1. Administer medication as ordered  2. Set limits on impulsive behavior  3. Make attempts to decrease external stimuli as possible  Outcome: Progressing     Pt presents with dull affect, anxious mood, linear thought process. Pt displays attention and medication-seeking behaviors on the unit. Pt states, \"you saw how I acted last night. I can throw chairs if I wanted to. I feel like I'm fighting a demon. I don't have feelings and can't even get a stomach ache anymore.\" Pt was encouraged to utilize positive coping skills, and remains on 1:1 observation for being unable to contract for safety. Pt continues to endorse SI without a plan. Pt denies HI/AVH at this time. Pt is medication compliant.   
  Problem: Self Harm/Suicidality  Goal: Will have no self-injury during hospital stay  Description: INTERVENTIONS:  1.  Ensure constant observer at bedside with Q15M safety checks  2.  Maintain a safe environment  3.  Secure patient belongings  4.  Ensure family/visitors adhere to safety recommendations  5.  Ensure safety tray has been added to patient's diet order  6.  Every shift and PRN: Re-assess suicidal risk via Frequent Screener    Outcome: Progressing     Problem: Depression  Goal: Will be euthymic at discharge  Description: INTERVENTIONS:  1. Administer medication as ordered  2. Provide emotional support via 1:1 interaction with staff  3. Encourage involvement in milieu/groups/activities  4. Monitor for social isolation  Outcome: Progressing     Problem: Angelica  Goal: Will exhibit normal sleep and speech and no impulsivity  Description: INTERVENTIONS:  1. Administer medication as ordered  2. Set limits on impulsive behavior  3. Make attempts to decrease external stimuli as possible  Outcome: Progressing     Pt presents with dull affect, anxious mood, linear thought process. Pt displays attention-seeking and medication-seeking behaviors Pt has been withdrawn to self on the unit, but does attend unit groups. Pt denies SI/HI/AVH at this time. Pt is medication compliant. Pt downgraded to Line-of-Sight observation due to ability to contract for safety consistently.  
  Problem: Self Harm/Suicidality  Goal: Will have no self-injury during hospital stay  Description: INTERVENTIONS:  1.  Ensure constant observer at bedside with Q15M safety checks  2.  Maintain a safe environment  3.  Secure patient belongings  4.  Ensure family/visitors adhere to safety recommendations  5.  Ensure safety tray has been added to patient's diet order  6.  Every shift and PRN: Re-assess suicidal risk via Frequent Screener    Outcome: Progressing     Problem: Depression  Goal: Will be euthymic at discharge  Description: INTERVENTIONS:  1. Administer medication as ordered  2. Provide emotional support via 1:1 interaction with staff  3. Encourage involvement in milieu/groups/activities  4. Monitor for social isolation  Outcome: Progressing     Problem: Angelica  Goal: Will exhibit normal sleep and speech and no impulsivity  Description: INTERVENTIONS:  1. Administer medication as ordered  2. Set limits on impulsive behavior  3. Make attempts to decrease external stimuli as possible  Outcome: Progressing     Pt presents with dull affect, anxious mood, with linear and goal-directed thought process. Pt has been withdrawn to self on the unit, sitting with his head down in the day area for much of the day. Pt attends unit groups. Pt denies SI/HI/AVH at this time. Pt is medication compliant. Pt remains on Line-of-Sight observation for safety.  
  Problem: Self Harm/Suicidality  Goal: Will have no self-injury during hospital stay  Description: INTERVENTIONS:  1.  Ensure constant observer at bedside with Q15M safety checks  2.  Maintain a safe environment  3.  Secure patient belongings  4.  Ensure family/visitors adhere to safety recommendations  5.  Ensure safety tray has been added to patient's diet order  6.  Every shift and PRN: Re-assess suicidal risk via Frequent Screener    Outcome: Progressing     Problem: Depression  Goal: Will be euthymic at discharge  Description: INTERVENTIONS:  1. Administer medication as ordered  2. Provide emotional support via 1:1 interaction with staff  3. Encourage involvement in milieu/groups/activities  4. Monitor for social isolation  Outcome: Progressing     Problem: Angelica  Goal: Will exhibit normal sleep and speech and no impulsivity  Description: INTERVENTIONS:  1. Administer medication as ordered  2. Set limits on impulsive behavior  3. Make attempts to decrease external stimuli as possible  Outcome: Progressing     Pt presents with dull affect, depressed mood, anxious at times, goal-directed thought process. Pt has been withdrawn to self on the unit, but attends unit groups. Pt denies SI/HI/AVH at this time. Pt is medication compliant.   
  Problem: Self Harm/Suicidality  Goal: Will have no self-injury during hospital stay  Description: INTERVENTIONS:  1.  Ensure constant observer at bedside with Q15M safety checks  2.  Maintain a safe environment  3.  Secure patient belongings  4.  Ensure family/visitors adhere to safety recommendations  5.  Ensure safety tray has been added to patient's diet order  6.  Every shift and PRN: Re-assess suicidal risk via Frequent Screener    Outcome: Progressing     Problem: Depression  Goal: Will be euthymic at discharge  Description: INTERVENTIONS:  1. Administer medication as ordered  2. Provide emotional support via 1:1 interaction with staff  3. Encourage involvement in milieu/groups/activities  4. Monitor for social isolation  Outcome: Progressing     Problem: Angelica  Goal: Will exhibit normal sleep and speech and no impulsivity  Description: INTERVENTIONS:  1. Administer medication as ordered  2. Set limits on impulsive behavior  3. Make attempts to decrease external stimuli as possible  Outcome: Progressing     Pt presents with dull affect, depressed mood, circumstantial thought process, tearful during assessment with staff. Pt displays staff-splitting and attention-seeking behaviors on the unit. Pt states, \"I feel numb. I don't have any feelings.\" Pt has been social on the unit. Pt endorses SI but denies intent or plan. Pt contracts for safety on the unit. MD made aware. Pt HI/AVH at this time. Pt is medication compliant.   
  Problem: Self Harm/Suicidality  Goal: Will have no self-injury during hospital stay  Description: INTERVENTIONS:  1.  Ensure constant observer at bedside with Q15M safety checks  2.  Maintain a safe environment  3.  Secure patient belongings  4.  Ensure family/visitors adhere to safety recommendations  5.  Ensure safety tray has been added to patient's diet order  6.  Every shift and PRN: Re-assess suicidal risk via Frequent Screener    Outcome: Progressing     Problem: Depression  Goal: Will be euthymic at discharge  Description: INTERVENTIONS:  1. Administer medication as ordered  2. Provide emotional support via 1:1 interaction with staff  3. Encourage involvement in milieu/groups/activities  4. Monitor for social isolation  Outcome: Progressing     Problem: Angelica  Goal: Will exhibit normal sleep and speech and no impulsivity  Description: INTERVENTIONS:  1. Administer medication as ordered  2. Set limits on impulsive behavior  3. Make attempts to decrease external stimuli as possible  Outcome: Progressing     Pt presents with dull affect, depressed mood, with linear and goal-directed thought process. Pt has been withdrawn to self on the unit, but did attend select groups. Pt denies SI/HI/AVH at this time. Pt is medication compliant.   
  Problem: Self Harm/Suicidality  Goal: Will have no self-injury during hospital stay  Description: INTERVENTIONS:  1.  Ensure constant observer at bedside with Q15M safety checks  2.  Maintain a safe environment  3.  Secure patient belongings  4.  Ensure family/visitors adhere to safety recommendations  5.  Ensure safety tray has been added to patient's diet order  6.  Every shift and PRN: Re-assess suicidal risk via Frequent Screener    Outcome: Progressing     Problem: Depression  Goal: Will be euthymic at discharge  Description: INTERVENTIONS:  1. Administer medication as ordered  2. Provide emotional support via 1:1 interaction with staff  3. Encourage involvement in milieu/groups/activities  4. Monitor for social isolation  Outcome: Progressing     Problem: Angelica  Goal: Will exhibit normal sleep and speech and no impulsivity  Description: INTERVENTIONS:  1. Administer medication as ordered  2. Set limits on impulsive behavior  3. Make attempts to decrease external stimuli as possible  Outcome: Progressing   Patient endorsed anxiety related to the family meeting that was planned today with the patient's spouse.  Patient presented restless and preoccupied in his thoughts. Patient stated he was not doing to well today because of the anticipation of his upcoming family visit.  Patient also c/o a gout flare up to his R knee.  Patient provided with PRN indomethacin and colchicine for symptom relief.  No behavioral issues noted.   
  Problem: Self Harm/Suicidality  Goal: Will have no self-injury during hospital stay  Description: INTERVENTIONS:  1.  Ensure constant observer at bedside with Q15M safety checks  2.  Maintain a safe environment  3.  Secure patient belongings  4.  Ensure family/visitors adhere to safety recommendations  5.  Ensure safety tray has been added to patient's diet order  6.  Every shift and PRN: Re-assess suicidal risk via Frequent Screener    Outcome: Progressing     Problem: Depression  Goal: Will be euthymic at discharge  Description: INTERVENTIONS:  1. Administer medication as ordered  2. Provide emotional support via 1:1 interaction with staff  3. Encourage involvement in milieu/groups/activities  4. Monitor for social isolation  Outcome: Progressing     Problem: Angelica  Goal: Will exhibit normal sleep and speech and no impulsivity  Description: INTERVENTIONS:  1. Administer medication as ordered  2. Set limits on impulsive behavior  3. Make attempts to decrease external stimuli as possible  Outcome: Progressing   Patient presents calm and cooperative.  He has been compliant with all phases of care.  No behavioral issues noted this shift.  Patient's anxiety has been stable this shift without the use of any PRN medication.  Patient denies SI/HI and AVH.  No behavioral issues noted.   
  Problem: Self Harm/Suicidality  Goal: Will have no self-injury during hospital stay  Description: INTERVENTIONS:  1.  Ensure constant observer at bedside with Q15M safety checks  2.  Maintain a safe environment  3.  Secure patient belongings  4.  Ensure family/visitors adhere to safety recommendations  5.  Ensure safety tray has been added to patient's diet order  6.  Every shift and PRN: Re-assess suicidal risk via Frequent Screener    Outcome: Progressing     Problem: Depression  Goal: Will be euthymic at discharge  Description: INTERVENTIONS:  1. Administer medication as ordered  2. Provide emotional support via 1:1 interaction with staff  3. Encourage involvement in milieu/groups/activities  4. Monitor for social isolation  Outcome: Progressing     Problem: Angelica  Goal: Will exhibit normal sleep and speech and no impulsivity  Description: INTERVENTIONS:  1. Administer medication as ordered  2. Set limits on impulsive behavior  3. Make attempts to decrease external stimuli as possible  Outcome: Progressing   Pt presents with flat but tense affect, depressed mood, with circumstantial thought process. Pt has been visible out on the unit but has low socialization with others.  Pt displays appropriate boundaries on the unit, adhering to unit guidelines. Pt slept 6.5 hours last night and his appetite is good. Pt appears well-groomed. Pt denies SI/HI/AVH. Pt is medication compliant.   
  Problem: Self Harm/Suicidality  Goal: Will have no self-injury during hospital stay  Description: INTERVENTIONS:  1.  Ensure constant observer at bedside with Q15M safety checks  2.  Maintain a safe environment  3.  Secure patient belongings  4.  Ensure family/visitors adhere to safety recommendations  5.  Ensure safety tray has been added to patient's diet order  6.  Every shift and PRN: Re-assess suicidal risk via Frequent Screener    Outcome: Progressing     Problem: Depression  Goal: Will be euthymic at discharge  Description: INTERVENTIONS:  1. Administer medication as ordered  2. Provide emotional support via 1:1 interaction with staff  3. Encourage involvement in milieu/groups/activities  4. Monitor for social isolation  Outcome: Progressing     Problem: Angelica  Goal: Will exhibit normal sleep and speech and no impulsivity  Description: INTERVENTIONS:  1. Administer medication as ordered  2. Set limits on impulsive behavior  3. Make attempts to decrease external stimuli as possible  Outcome: Progressing  Patient has been withdrawn and isolative to his room this shift with the exception of meals and snacks.  Patient denies SI/HI and AVH.  Patient denies pain.  No behavioral issues noted.    
  Problem: Self Harm/Suicidality  Goal: Will have no self-injury during hospital stay  Description: INTERVENTIONS:  1.  Ensure constant observer at bedside with Q15M safety checks  2.  Maintain a safe environment  3.  Secure patient belongings  4.  Ensure family/visitors adhere to safety recommendations  5.  Ensure safety tray has been added to patient's diet order  6.  Every shift and PRN: Re-assess suicidal risk via Frequent Screener    Outcome: Progressing     Problem: Depression  Goal: Will be euthymic at discharge  Description: INTERVENTIONS:  1. Administer medication as ordered  2. Provide emotional support via 1:1 interaction with staff  3. Encourage involvement in milieu/groups/activities  4. Monitor for social isolation  Outcome: Progressing     Problem: Behavior  Goal: Pt/Family maintain appropriate behavior and adhere to behavioral management agreement, if implemented  Description: INTERVENTIONS:  1. Assess patient/family's coping skills and  non-compliant behavior (including use of illegal substances)  2. Notify security of behavior or suspected illegal substances which indicate the need for search of the family and/or belongings  3. Encourage verbalization of thoughts and concerns in a socially appropriate manner  4. Utilize positive, consistent limit setting strategies supporting safety of patient, staff and others  5. Encourage participation in the decision making process about the behavioral management agreement  6. If a visitor's behavior poses a threat to safety call refer to organization policy.  7. Initiate consult with , Psychosocial CNS, Spiritual Care as appropriate  8/7/2024 1324 by Catherine Mcanlly RN  Outcome: Progressing     Problem: Self Care Deficit  Goal: Return ADL status to a safe level of function  Description: INTERVENTIONS:  1. Administer medication as ordered  2. Assess ADL deficits and provide assistive devices as needed  3. Obtain PT/OT consults as needed  4. Assist 
  Problem: Self Harm/Suicidality  Goal: Will have no self-injury during hospital stay  Description: INTERVENTIONS:  1.  Ensure constant observer at bedside with Q15M safety checks  2.  Maintain a safe environment  3.  Secure patient belongings  4.  Ensure family/visitors adhere to safety recommendations  5.  Ensure safety tray has been added to patient's diet order  6.  Every shift and PRN: Re-assess suicidal risk via Frequent Screener    Outcome: Progressing     Problem: Depression  Goal: Will be euthymic at discharge  Description: INTERVENTIONS:  1. Administer medication as ordered  2. Provide emotional support via 1:1 interaction with staff  3. Encourage involvement in milieu/groups/activities  4. Monitor for social isolation  Outcome: Progressing     Problem: Behavior  Goal: Pt/Family maintain appropriate behavior and adhere to behavioral management agreement, if implemented  Description: INTERVENTIONS:  1. Assess patient/family's coping skills and  non-compliant behavior (including use of illegal substances)  2. Notify security of behavior or suspected illegal substances which indicate the need for search of the family and/or belongings  3. Encourage verbalization of thoughts and concerns in a socially appropriate manner  4. Utilize positive, consistent limit setting strategies supporting safety of patient, staff and others  5. Encourage participation in the decision making process about the behavioral management agreement  6. If a visitor's behavior poses a threat to safety call refer to organization policy.  7. Initiate consult with , Psychosocial CNS, Spiritual Care as appropriate  Outcome: Progressing    Pt alert & oriented x 3 and able to make needs known. Pt cooperative and pleasant. Affect is flat and patient reports mild feelings of anxiousness and depression. Pt denies thoughts of SI/HI & hallucinations. Pt tolerated medications well. Active care plan in place.      
  Problem: Self Harm/Suicidality  Goal: Will have no self-injury during hospital stay  Description: INTERVENTIONS:  1.  Ensure constant observer at bedside with Q15M safety checks  2.  Maintain a safe environment  3.  Secure patient belongings  4.  Ensure family/visitors adhere to safety recommendations  5.  Ensure safety tray has been added to patient's diet order  6.  Every shift and PRN: Re-assess suicidal risk via Frequent Screener    Outcome: Progressing     Problem: Depression  Goal: Will be euthymic at discharge  Description: INTERVENTIONS:  1. Administer medication as ordered  2. Provide emotional support via 1:1 interaction with staff  3. Encourage involvement in milieu/groups/activities  4. Monitor for social isolation  Outcome: Progressing     Problem: Behavior  Goal: Pt/Family maintain appropriate behavior and adhere to behavioral management agreement, if implemented  Description: INTERVENTIONS:  1. Assess patient/family's coping skills and  non-compliant behavior (including use of illegal substances)  2. Notify security of behavior or suspected illegal substances which indicate the need for search of the family and/or belongings  3. Encourage verbalization of thoughts and concerns in a socially appropriate manner  4. Utilize positive, consistent limit setting strategies supporting safety of patient, staff and others  5. Encourage participation in the decision making process about the behavioral management agreement  6. If a visitor's behavior poses a threat to safety call refer to organization policy.  7. Initiate consult with , Psychosocial CNS, Spiritual Care as appropriate  Outcome: Progressing   Pt presents with brighter affect earlier but changed to increased anxiety with SI and a plan to shoot himself if he is discharged, depressed mood, with abnormal thought process. Patient status change to suicide precaution. Pt slept 8 hours last night  and appetite.  Pt is medication compliant. 
  Problem: Self Harm/Suicidality  Goal: Will have no self-injury during hospital stay  Description: INTERVENTIONS:  1.  Ensure constant observer at bedside with Q15M safety checks  2.  Maintain a safe environment  3.  Secure patient belongings  4.  Ensure family/visitors adhere to safety recommendations  5.  Ensure safety tray has been added to patient's diet order  6.  Every shift and PRN: Re-assess suicidal risk via Frequent Screener    Outcome: Progressing     Problem: Depression  Goal: Will be euthymic at discharge  Description: INTERVENTIONS:  1. Administer medication as ordered  2. Provide emotional support via 1:1 interaction with staff  3. Encourage involvement in milieu/groups/activities  4. Monitor for social isolation  Outcome: Progressing     Problem: Depression  Goal: Will be euthymic at discharge  Description: INTERVENTIONS:  1. Administer medication as ordered  2. Provide emotional support via 1:1 interaction with staff  3. Encourage involvement in milieu/groups/activities  4. Monitor for social isolation  Outcome: Progressing     Problem: Angelica  Goal: Will exhibit normal sleep and speech and no impulsivity  Description: INTERVENTIONS:  1. Administer medication as ordered  2. Set limits on impulsive behavior  3. Make attempts to decrease external stimuli as possible  Outcome: Progressing     Problem: Psychosis  Goal: Will report no hallucinations or delusions  Description: INTERVENTIONS:  1. Administer medication as  ordered  2. Assist with reality testing to support increasing orientation  3. Assess if patient's hallucinations or delusions are encouraging self harm or harm to others and intervene as appropriate  Outcome: Progressing     Problem: Behavior  Goal: Pt/Family maintain appropriate behavior and adhere to behavioral management agreement, if implemented  Description: INTERVENTIONS:  1. Assess patient/family's coping skills and  non-compliant behavior (including use of illegal substances)  2. 
  Problem: Self Harm/Suicidality  Goal: Will have no self-injury during hospital stay  Description: INTERVENTIONS:  1.  Ensure constant observer at bedside with Q15M safety checks  2.  Maintain a safe environment  3.  Secure patient belongings  4.  Ensure family/visitors adhere to safety recommendations  5.  Ensure safety tray has been added to patient's diet order  6.  Every shift and PRN: Re-assess suicidal risk via Frequent Screener    Outcome: Progressing     Problem: Depression  Goal: Will be euthymic at discharge  Description: INTERVENTIONS:  1. Administer medication as ordered  2. Provide emotional support via 1:1 interaction with staff  3. Encourage involvement in milieu/groups/activities  4. Monitor for social isolation  Outcome: Progressing     Problem: Psychosis  Goal: Will report no hallucinations or delusions  Description: INTERVENTIONS:  1. Administer medication as  ordered  2. Assist with reality testing to support increasing orientation  3. Assess if patient's hallucinations or delusions are encouraging self harm or harm to others and intervene as appropriate  Outcome: Progressing   Pt presents with neutral affect, stable mood, with linear thought process. Pt has been visible on the unit watching tv.  Pt displays appropriate boundaries on the unit, adhering to unit guidelines. Pt has good appetite. Pt appears well-groomed. Pt denies SI/HI/AVH. Pt is medication compliant.   
  Problem: Self Harm/Suicidality  Goal: Will have no self-injury during hospital stay  Description: INTERVENTIONS:  1.  Ensure constant observer at bedside with Q15M safety checks  2.  Maintain a safe environment  3.  Secure patient belongings  4.  Ensure family/visitors adhere to safety recommendations  5.  Ensure safety tray has been added to patient's diet order  6.  Every shift and PRN: Re-assess suicidal risk via Frequent Screener    Outcome: Progressing     Problem: Psychosis  Goal: Will report no hallucinations or delusions  Description: INTERVENTIONS:  1. Administer medication as  ordered  2. Assist with reality testing to support increasing orientation  3. Assess if patient's hallucinations or delusions are encouraging self harm or harm to others and intervene as appropriate  Outcome: Progressing     Problem: Behavior  Goal: Pt/Family maintain appropriate behavior and adhere to behavioral management agreement, if implemented  Description: INTERVENTIONS:  1. Assess patient/family's coping skills and  non-compliant behavior (including use of illegal substances)  2. Notify security of behavior or suspected illegal substances which indicate the need for search of the family and/or belongings  3. Encourage verbalization of thoughts and concerns in a socially appropriate manner  4. Utilize positive, consistent limit setting strategies supporting safety of patient, staff and others  5. Encourage participation in the decision making process about the behavioral management agreement  6. If a visitor's behavior poses a threat to safety call refer to organization policy.  7. Initiate consult with , Psychosocial CNS, Spiritual Care as appropriate  Outcome: Progressing       Pt has been withdrawn to his room this shift.  Denies si/hi and avh.  Was compliant w/ hs scheduled medications.  Continues to endorse depression, and cfs. Will continue to monitor/support  
  Problem: Self Harm/Suicidality  Goal: Will have no self-injury during hospital stay  Description: INTERVENTIONS:  1.  Ensure constant observer at bedside with Q15M safety checks  2.  Maintain a safe environment  3.  Secure patient belongings  4.  Ensure family/visitors adhere to safety recommendations  5.  Ensure safety tray has been added to patient's diet order  6.  Every shift and PRN: Re-assess suicidal risk via Frequent Screener    Outcome: Progressing     Problem: Psychosis  Goal: Will report no hallucinations or delusions  Description: INTERVENTIONS:  1. Administer medication as  ordered  2. Assist with reality testing to support increasing orientation  3. Assess if patient's hallucinations or delusions are encouraging self harm or harm to others and intervene as appropriate  Outcome: Progressing     Problem: Behavior  Goal: Pt/Family maintain appropriate behavior and adhere to behavioral management agreement, if implemented  Description: INTERVENTIONS:  1. Assess patient/family's coping skills and  non-compliant behavior (including use of illegal substances)  2. Notify security of behavior or suspected illegal substances which indicate the need for search of the family and/or belongings  3. Encourage verbalization of thoughts and concerns in a socially appropriate manner  4. Utilize positive, consistent limit setting strategies supporting safety of patient, staff and others  5. Encourage participation in the decision making process about the behavioral management agreement  6. If a visitor's behavior poses a threat to safety call refer to organization policy.  7. Initiate consult with , Psychosocial CNS, Spiritual Care as appropriate  Outcome: Progressing       Pt visible in the milieu during the beginning of the shift.  Presents anxious, hopeless/helpless.  Continues to complain of depression and anxiety and intrusive thoughts to hurt others but states that he doesn't feel compelled to act on 
  Problem: Self Harm/Suicidality  Goal: Will have no self-injury during hospital stay  Description: INTERVENTIONS:  1.  Ensure constant observer at bedside with Q15M safety checks  2.  Maintain a safe environment  3.  Secure patient belongings  4.  Ensure family/visitors adhere to safety recommendations  5.  Ensure safety tray has been added to patient's diet order  6.  Every shift and PRN: Re-assess suicidal risk via Frequent Screener    Outcome: Progressing  Flowsheets (Taken 8/3/2024 2127)  Will have no self-injury during hospital stay:   Ensure constant observer at bedside with Q15M safety checks   Ensure family/visitors adhere to safety recommendations   Every shift and PRN: Re-assess suicidal risk via Frequent Screener   Maintain a safe environment   Ensure safety tray has been added to patient's diet order   Secure patient belongings     Problem: Depression  Goal: Will be euthymic at discharge  Description: INTERVENTIONS:  1. Administer medication as ordered  2. Provide emotional support via 1:1 interaction with staff  3. Encourage involvement in milieu/groups/activities  4. Monitor for social isolation  Outcome: Not Progressing     Problem: Anxiety  Goal: Will report anxiety at manageable levels  Description: INTERVENTIONS:  1. Administer medication as ordered  2. Teach and rehearse alternative coping skills  3. Provide emotional support with 1:1 interaction with staff  Outcome: Not Progressing  Flowsheets (Taken 8/3/2024 2127)  Will report anxiety at manageable levels:   Administer medication as ordered   Teach and rehearse alternative coping skills    Pt arrived to unit via wheelchair, escorted by security and ED staff  for SI with a plan to shoot self with gun. Pt stated since his thoughts of SI, his Wife has removed all weapons from house. Upon arrival to unit, pt denies SI/HI/AVH but did state he was feeling depressed and very anxious. Pt was assessed, oriented to unit and all belongings were logged and 
Problem: Depression  Goal: Will be euthymic at discharge  Description: INTERVENTIONS:  1. Administer medication as ordered  2. Provide emotional support via 1:1 interaction with staff  3. Encourage involvement in milieu/groups/activities  4. Monitor for social isolation  Outcome: Not Progressing    Pt alert & oriented x 3 and able to make needs known. Pt cooperative and pleasant. Affect is blunt. Patient reports feelings of depression, but denies anxiety. Pt denies thoughts of SI/HI, & hallucinations. Pt tolerated medications well. Active care plan in place.        
Problem: Pain  Goal: Verbalizes/displays adequate comfort level or baseline comfort level  9/22/2024 0926 by Clara Cano RN  Outcome: Progressing     Problem: Spiritual Care  Goal: Pt/Family able to move forward in process of forgiving self, others, and/or higher power  Description: INTERVENTIONS:  1. Assist patient/family to overcome blocks to healing by use of spiritual practices (prayer, meditation, guided imagery, reiki, breath work, etc).  2. De-myth guilt and help patient/family identify possible irrational spiritual/cultural beliefs and values.  3. Explore possibilities of making amends & reconciliation with self, others, and/or a greater power.  4. Guide patient/family in identifying painful feelings of guilt.  5. Help patient/famiy explore and identify spiritual beliefs, cultural understandings or values that may help or hinder letting go of issue.  6. Help patient/family explore feelings of anger, bitterness, resentment.  7. Help patient/family identify and examine the situation in which these feelings are experienced.  8. Help patient/family identify destructive displacement of feelings onto other individuals.  9. Invite use of sacraments/rituals/ceremonies as appropriate (e.g. - confession, anointing, smudging).  10. Refer patient/family to formal counseling and/or to lakshmi community for further support work.  9/22/2024 0926 by Clara Cano RN  Outcome: Progressing    Problem: Abuse/Neglect  Goal: Pt/Caregiver aware of resources to assist with issues of abuse and neglect  Description: INTERVENTIONS:  1. Assess for level of risk and safety  2. Initiate referral to Social Work and notify Licensed Independent Practictioner (LIP)  3. Provide appropriate education and resources to patient and/or family  4. Initiate referral to Adult Protective Services, as appropriate  5. Initiate referral to Child Protective Services, as appropriate  6. Offer to have the patient's the patient's chart marked as 
Problem: Self Harm/Suicidality  Goal: Will have no self-injury during hospital stay  Description: INTERVENTIONS:  1.  Ensure constant observer at bedside with Q15M safety checks  2.  Maintain a safe environment  3.  Secure patient belongings  4.  Ensure family/visitors adhere to safety recommendations  5.  Ensure safety tray has been added to patient's diet order  6.  Every shift and PRN: Re-assess suicidal risk via Frequent Screener    8/25/2024 0850 by Clara Cano, RN  Outcome: Progressing     Problem: Depression  Goal: Will be euthymic at discharge  Description: INTERVENTIONS:  1. Administer medication as ordered  2. Provide emotional support via 1:1 interaction with staff  3. Encourage involvement in milieu/groups/activities  4. Monitor for social isolation  8/25/2024 0850 by Clara Cano, RN  Outcome: Progressing    Problem: Angelica  Goal: Will exhibit normal sleep and speech and no impulsivity  Description: INTERVENTIONS:  1. Administer medication as ordered  2. Set limits on impulsive behavior  3. Make attempts to decrease external stimuli as possible  8/25/2024 0850 by Clara Cano, RN  Outcome: Progressing     Pt presents with dull affect, anxious mood, intermittently tearful, circumstantial thought process. Pt has been social on the unit. Pt displays attention-seeking and medication-seeking behaviors on the unit. Pt endorses SI, but denies intent or plan. Pt contracts for safety. Pt denies HI/AVH at this time. Pt is medication compliant.   
Problem: Self Harm/Suicidality  Goal: Will have no self-injury during hospital stay  Description: INTERVENTIONS:  1.  Ensure constant observer at bedside with Q15M safety checks  2.  Maintain a safe environment  3.  Secure patient belongings  4.  Ensure family/visitors adhere to safety recommendations  5.  Ensure safety tray has been added to patient's diet order  6.  Every shift and PRN: Re-assess suicidal risk via Frequent Screener    8/9/2024 1131 by Clara Cano RN  Outcome: Progressing     Problem: Depression  Goal: Will be euthymic at discharge  Description: INTERVENTIONS:  1. Administer medication as ordered  2. Provide emotional support via 1:1 interaction with staff  3. Encourage involvement in milieu/groups/activities  4. Monitor for social isolation  Outcome: Progressing     Problem: Angelica  Goal: Will exhibit normal sleep and speech and no impulsivity  Description: INTERVENTIONS:  1. Administer medication as ordered  2. Set limits on impulsive behavior  3. Make attempts to decrease external stimuli as possible  Outcome: Progressing     Pt presents with bright affect, anxious mood, with linear and goal-directed thought process, poor judgment and insight. Pt displays staff-splitting behaviors on the unit. Pt has been social on the unit, and attends unit groups. Pt denies SI/HI/AVH at this time. Pt is medication compliant.   
Problem: Self Harm/Suicidality  Goal: Will have no self-injury during hospital stay  Description: INTERVENTIONS:  1.  Ensure constant observer at bedside with Q15M safety checks  2.  Maintain a safe environment  3.  Secure patient belongings  4.  Ensure family/visitors adhere to safety recommendations  5.  Ensure safety tray has been added to patient's diet order  6.  Every shift and PRN: Re-assess suicidal risk via Frequent Screener    9/8/2024 0935 by Clara Cano RN  Outcome: Progressing    Problem: Depression  Goal: Will be euthymic at discharge  Description: INTERVENTIONS:  1. Administer medication as ordered  2. Provide emotional support via 1:1 interaction with staff  3. Encourage involvement in milieu/groups/activities  4. Monitor for social isolation  Outcome: Progressing     Problem: Angelica  Goal: Will exhibit normal sleep and speech and no impulsivity  Description: INTERVENTIONS:  1. Administer medication as ordered  2. Set limits on impulsive behavior  3. Make attempts to decrease external stimuli as possible  Outcome: Progressing     Pt presents with dull affect, anxious mood, circumstantial thought process, poor judgment. Pt has been withdrawn to self on the unit, but did attend  group. Pt continues to display attention-seeking behaviors. Pt endorses SI, but denies intent or plan. Pt contracts for safety on the unit. Pt denies HI/AVH at this time. Pt is medication compliant. Pt remains on 1:1 observation for safety.    
Pt denies thoughts of SI/HI, & hallucinations. Pt tolerated medications well. Active care plan in place.      
Consults from Ethics, Palliative Care or initiate Family Care Conference as is appropriate  Outcome: Progressing     Problem: Confusion  Goal: Confusion, delirium, dementia, or psychosis is improved or at baseline  Description: INTERVENTIONS:  1. Assess for possible contributors to thought disturbance, including medications, impaired vision or hearing, underlying metabolic abnormalities, dehydration, psychiatric diagnoses, and notify attending LIP  2. [place] high risk fall precautions, as indicated  3. Provide frequent short contacts to provide reality reorientation, refocusing and direction  4. Decrease environmental stimuli, including noise as appropriate  5. Monitor and intervene to maintain adequate nutrition, hydration, elimination, sleep and activity  6. If unable to ensure safety without constant attention obtain sitter and review sitter guidelines with assigned personnel  7. Initiate Psychosocial CNS and Spiritual Care consult, as indicated  Outcome: Progressing     Problem: Depression/Self Harm  Goal: Effect of psychiatric condition will be minimized and patient will be protected from self harm  Description: INTERVENTIONS:  1. Assess impact of patient's symptoms on level of functioning, self care needs and offer support as indicated  2. Assess patient/family knowledge of depression, impact on illness and need for teaching  3. Provide emotional support, presence and reassurance  4. Assess for possible suicidal thoughts or ideation. If patient expresses suicidal thoughts or statements do not leave alone, initiate Suicide Precautions, move to a room close to the nursing station and obtain sitter  5. Initiate consults as appropriate with Mental Health Professional, Spiritual Care, Psychosocial CNS, and consider a recommendation to the LIP for a Psychiatric Consultation  Outcome: Progressing     Problem: Abuse/Neglect  Goal: Pt/Caregiver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

## 2024-10-08 ENCOUNTER — HOSPITAL ENCOUNTER (EMERGENCY)
Facility: HOSPITAL | Age: 69
Discharge: HOME | End: 2024-10-08
Attending: EMERGENCY MEDICINE
Payer: MEDICARE

## 2024-10-08 VITALS
DIASTOLIC BLOOD PRESSURE: 79 MMHG | HEIGHT: 72 IN | WEIGHT: 190 LBS | BODY MASS INDEX: 25.73 KG/M2 | HEART RATE: 88 BPM | TEMPERATURE: 97.5 F | RESPIRATION RATE: 15 BRPM | OXYGEN SATURATION: 98 % | SYSTOLIC BLOOD PRESSURE: 131 MMHG

## 2024-10-08 DIAGNOSIS — R45.850 HOMICIDAL IDEATIONS: ICD-10-CM

## 2024-10-08 DIAGNOSIS — R45.851 SUICIDAL IDEATIONS: Primary | ICD-10-CM

## 2024-10-08 LAB
ALBUMIN SERPL-MCNC: 3.8 G/DL (ref 3.4–5)
ALBUMIN/GLOB SERPL: 1.3 (ref 0.8–1.7)
ALP SERPL-CCNC: 110 U/L (ref 45–117)
ALT SERPL-CCNC: 14 U/L (ref 16–61)
AMPHET UR QL SCN: NEGATIVE
ANION GAP SERPL CALC-SCNC: 8 MMOL/L (ref 3–18)
AST SERPL-CCNC: 12 U/L (ref 10–38)
BARBITURATES UR QL SCN: NEGATIVE
BASOPHILS # BLD: 0 K/UL (ref 0–0.1)
BASOPHILS NFR BLD: 0 % (ref 0–2)
BENZODIAZ UR QL: NEGATIVE
BILIRUB SERPL-MCNC: 0.9 MG/DL (ref 0.2–1)
BUN SERPL-MCNC: 11 MG/DL (ref 7–18)
BUN/CREAT SERPL: 9 (ref 12–20)
CALCIUM SERPL-MCNC: 9.1 MG/DL (ref 8.5–10.1)
CANNABINOIDS UR QL SCN: NEGATIVE
CHLORIDE SERPL-SCNC: 112 MMOL/L (ref 100–111)
CO2 SERPL-SCNC: 22 MMOL/L (ref 21–32)
COCAINE UR QL SCN: NEGATIVE
CREAT SERPL-MCNC: 1.19 MG/DL (ref 0.6–1.3)
DIFFERENTIAL METHOD BLD: ABNORMAL
EKG ATRIAL RATE: 79 BPM
EKG DIAGNOSIS: NORMAL
EKG P AXIS: 94 DEGREES
EKG P-R INTERVAL: 168 MS
EKG Q-T INTERVAL: 392 MS
EKG QRS DURATION: 92 MS
EKG QTC CALCULATION (BAZETT): 449 MS
EKG R AXIS: -4 DEGREES
EKG T AXIS: 15 DEGREES
EKG VENTRICULAR RATE: 79 BPM
EOSINOPHIL # BLD: 0.2 K/UL (ref 0–0.4)
EOSINOPHIL NFR BLD: 2 % (ref 0–5)
ERYTHROCYTE [DISTWIDTH] IN BLOOD BY AUTOMATED COUNT: 12.7 % (ref 11.6–14.5)
FLUAV RNA SPEC QL NAA+PROBE: NOT DETECTED
FLUBV RNA SPEC QL NAA+PROBE: NOT DETECTED
GLOBULIN SER CALC-MCNC: 2.9 G/DL (ref 2–4)
GLUCOSE SERPL-MCNC: 124 MG/DL (ref 74–99)
HCT VFR BLD AUTO: 37.2 % (ref 36–48)
HGB BLD-MCNC: 12.8 G/DL (ref 13–16)
IMM GRANULOCYTES # BLD AUTO: 0.1 K/UL (ref 0–0.04)
IMM GRANULOCYTES NFR BLD AUTO: 1 % (ref 0–0.5)
LYMPHOCYTES # BLD: 0.9 K/UL (ref 0.9–3.6)
LYMPHOCYTES NFR BLD: 10 % (ref 21–52)
Lab: NORMAL
MCH RBC QN AUTO: 31.8 PG (ref 24–34)
MCHC RBC AUTO-ENTMCNC: 34.4 G/DL (ref 31–37)
MCV RBC AUTO: 92.5 FL (ref 78–100)
METHADONE UR QL: NEGATIVE
MONOCYTES # BLD: 0.7 K/UL (ref 0.05–1.2)
MONOCYTES NFR BLD: 8 % (ref 3–10)
NEUTS SEG # BLD: 7 K/UL (ref 1.8–8)
NEUTS SEG NFR BLD: 79 % (ref 40–73)
NRBC # BLD: 0 K/UL (ref 0–0.01)
NRBC BLD-RTO: 0 PER 100 WBC
OPIATES UR QL: NEGATIVE
PCP UR QL: NEGATIVE
PLATELET # BLD AUTO: 190 K/UL (ref 135–420)
PMV BLD AUTO: 8.5 FL (ref 9.2–11.8)
POTASSIUM SERPL-SCNC: 3.9 MMOL/L (ref 3.5–5.5)
PROT SERPL-MCNC: 6.7 G/DL (ref 6.4–8.2)
RBC # BLD AUTO: 4.02 M/UL (ref 4.35–5.65)
SARS-COV-2 RNA RESP QL NAA+PROBE: NOT DETECTED
SODIUM SERPL-SCNC: 142 MMOL/L (ref 136–145)
SOURCE: NORMAL
TROPONIN I SERPL HS-MCNC: 5 NG/L (ref 0–78)
WBC # BLD AUTO: 8.8 K/UL (ref 4.6–13.2)

## 2024-10-08 PROCEDURE — 84484 ASSAY OF TROPONIN QUANT: CPT

## 2024-10-08 PROCEDURE — 87636 SARSCOV2 & INF A&B AMP PRB: CPT

## 2024-10-08 PROCEDURE — 80307 DRUG TEST PRSMV CHEM ANLYZR: CPT

## 2024-10-08 PROCEDURE — 94761 N-INVAS EAR/PLS OXIMETRY MLT: CPT

## 2024-10-08 PROCEDURE — 93010 ELECTROCARDIOGRAM REPORT: CPT | Performed by: INTERNAL MEDICINE

## 2024-10-08 PROCEDURE — 80053 COMPREHEN METABOLIC PANEL: CPT

## 2024-10-08 PROCEDURE — 85025 COMPLETE CBC W/AUTO DIFF WBC: CPT

## 2024-10-08 PROCEDURE — 93005 ELECTROCARDIOGRAM TRACING: CPT | Performed by: EMERGENCY MEDICINE

## 2024-10-08 PROCEDURE — 99285 EMERGENCY DEPT VISIT HI MDM: CPT

## 2024-10-08 ASSESSMENT — PAIN - FUNCTIONAL ASSESSMENT: PAIN_FUNCTIONAL_ASSESSMENT: NONE - DENIES PAIN

## 2024-10-08 NOTE — ED NOTES
PT CHANGED INTO BLUE SCRUBS AND RED SOCKS.  bELONGINS PLACED IN LOCKER 4 TOP SHELF.  PT REQUESTING A CELL PHONE TO CALL HIS WIFE.  RN AWARE

## 2024-10-08 NOTE — ED PROVIDER NOTES
[facility] EMERGENCY DEPT  EMERGENCY DEPARTMENT ENCOUNTER      Pt Name: Hernandez Franz  MRN: [id number]  Birthdate 1955  Date of evaluation: 10/8/2024  Provider: AWILDA COSBY MD  1:38 PM    CHIEF COMPLAINT       Chief Complaint   Patient presents with    Anxiety         HISTORY OF PRESENT ILLNESS    Hernandez Franz is a 68 y.o. male who presents to the emergency department     68-year-old gentleman past medical history of psychiatric disorder anxiety depression TIA sleep apnea from chart.  Patient presents initially with anxiety then that turned into he is homicidal wants to hurt his wife then suicidal.  Patient is tearful during interview.    The history is provided by the patient. No  was used.       Nursing Notes were reviewed.    REVIEW OF SYSTEMS       Review of Systems   Psychiatric/Behavioral:  Positive for agitation and suicidal ideas. Negative for behavioral problems, confusion, decreased concentration, dysphoric mood, hallucinations, self-injury and sleep disturbance. The patient is nervous/anxious. The patient is not hyperactive.        Except as noted above the remainder of the review of systems was reviewed and negative.       PAST MEDICAL HISTORY     Past Medical History:   Diagnosis Date    Anxiety and depression     Cervical radiculopathy     Dr. Rahman    Diverticulitis     Fatty liver disease, nonalcoholic 10/14    ALT up    Gout     Hep C w/o coma, chronic (HCC)     Herniated disc, cervical     C5-C6    Hydrocele     Hydrocele     Hyperlipidemia     Insomnia     Kidney stone     Sleep apnea     uses CPAP    Stroke (HCC)     TIA in the past but cannot remember         SURGICAL HISTORY       Past Surgical History:   Procedure Laterality Date    CARPAL TUNNEL RELEASE      COLONOSCOPY  12/15    repeat 12/20 - Dr. Escalona    LUMBAR DISCECTOMY      L4-L5    MOHS SURGERY      OTHER SURGICAL HISTORY      hemorrhoid    OTHER SURGICAL HISTORY  1967    brain sx for blood clot -

## 2024-10-08 NOTE — VIRTUAL HEALTH
Hernandez Franz  [id number]  1955     Social Work Behavioral Health Crisis Assessment    10/08/24    Chief Complaint: Anxiety, HI    HPI: Patient is a 68 y.o. White (non-) male who presents for anxiety. Patient presented to the ED on 10/08/24 from home via EMS. Records show pt has a hx of anxiety, depression and insomnia and was recently at [facility] for 58 days (8/3/24-9/30/24). Records show they attempted to discharge the pt multiple times after medication adjustments and the pt reporting feeling stable, but when discharge came around the pt would \"have a meltdown with crying, pulling at his hair and pacing about saying he could not tolerate going home. He had suicidal ideas and could not contract for safety. He expressed homicidal ideas to wife as he felt overwhelmed\". Pt was stabilized on medications and discharged with follow-up at [facility] with Dr. Caldwell.     Upon assessment pt is cooperative and agreeable to meet with writer via [employer]. When asked about reason for presentation pt states he has had pain in his neck and was unable to sleep so he took 5 of his father's Tramadol pills. He states when he woke up this morning he felt like he had overdosed but states \"it was actually just an anxiety attack\". He reports a hx of anxiety and anxiety attacks. He reports 4 previous psychiatric admissions for anxiety, depression and SI and also reports hx of HI towards his wife but denies any SI/HI at the moment. However he did tell ED staff earlier he was feeling homicidal (\"I am homicidal right now can you hold my hands down\"). Pt states \"it comes and goes\" and says he was feeling homicidal earlier. He attributes all of his symptoms today to lack of sleep and neck pain and states it's been making him feel \"empty inside\". He states \"I don't feel anything anymore and mentally and emotionally I can't feel or enjoy anything anymore\". He also reports feeling concerned about \"hurting

## 2024-10-08 NOTE — ED TRIAGE NOTES
Patient arrived vis EMS from with c/o anxiety. Patient took 4  tramadol 50 mg patient has hx anxiety, depression and insomnia. Patient states\" I am homicidal right now can you hold my hands down\"

## 2024-11-22 NOTE — DISCHARGE SUMMARY
Behavioral Health Addendum to Discharge Summary    Admit Date: 8/3/2024  Hospital day 10    Vitals : Patient Vitals for the past 8 hrs:   BP Temp Temp src Pulse Resp SpO2   08/14/24 0737 (!) 144/82 97.5 °F (36.4 °C) Oral 71 16 100 %     Labs:  No results found for this or any previous visit (from the past 24 hour(s)).  Meds:   Current Facility-Administered Medications   Medication Dose Route Frequency    QUEtiapine (SEROQUEL) tablet 50 mg  50 mg Oral Nightly    clonazePAM (KLONOPIN) tablet 1 mg  1 mg Oral TID    LORazepam (ATIVAN) tablet 1 mg  1 mg Oral Q6H PRN    hydrOXYzine HCl (ATARAX) tablet 50 mg  50 mg Oral Q6H PRN    amitriptyline (ELAVIL) tablet 100 mg  100 mg Oral Nightly    colchicine (MITIGARE) capsule 0.6 mg  0.6 mg Oral BID PRN    acetaminophen (TYLENOL) tablet 650 mg  650 mg Oral Q4H PRN    aluminum & magnesium hydroxide-simethicone (MAALOX) 200-200-20 MG/5ML suspension 30 mL  30 mL Oral Q6H PRN    traZODone (DESYREL) tablet 50 mg  50 mg Oral Nightly PRN    atorvastatin (LIPITOR) tablet 20 mg  20 mg Oral Nightly    aspirin EC tablet 81 mg  81 mg Oral Daily    Vitamin D (CHOLECALCIFEROL) tablet 2,000 Units  2,000 Units Oral Daily    DULoxetine (CYMBALTA) extended release capsule 60 mg  60 mg Oral BID    lamoTRIgine (LAMICTAL) tablet 150 mg  150 mg Oral BID      Hospital Problems: Principal Problem:    Major depressive disorder without psychotic features  Resolved Problems:    * No resolved hospital problems. *      Subjective:   Medication side effects: none      Mental Status Exam  Sensorium: alert  Orientation: oriented to time, place, person and situation  Relations: cooperative  Eye Contact: appropriate  Appearance: shows no evidence of impairment  Thought Process: normal rate of thoughts, fair abstract reasoning/computation, and logical    Thought Content: no evidence of impairment   Suicidal: denies   Homicidal: denies   Mood: is anxious   Affect: stable  Memory: shows no evidence of impairment 
Behavioral Health Discharge Note    Admit Date: 8/3/2024  Hospital day 9    Vitals : Patient Vitals for the past 8 hrs:   BP Temp Temp src Pulse Resp SpO2   08/12/24 0900 (!) 148/86 98.8 °F (37.1 °C) Oral 70 14 97 %     Labs:  No results found for this or any previous visit (from the past 24 hour(s)).  Meds:   Current Facility-Administered Medications   Medication Dose Route Frequency    clonazePAM (KLONOPIN) tablet 1 mg  1 mg Oral TID    QUEtiapine (SEROQUEL) tablet 25 mg  25 mg Oral Nightly    LORazepam (ATIVAN) tablet 1 mg  1 mg Oral Q6H PRN    hydrOXYzine HCl (ATARAX) tablet 50 mg  50 mg Oral Q6H PRN    amitriptyline (ELAVIL) tablet 100 mg  100 mg Oral Nightly    colchicine (MITIGARE) capsule 0.6 mg  0.6 mg Oral BID PRN    acetaminophen (TYLENOL) tablet 650 mg  650 mg Oral Q4H PRN    aluminum & magnesium hydroxide-simethicone (MAALOX) 200-200-20 MG/5ML suspension 30 mL  30 mL Oral Q6H PRN    traZODone (DESYREL) tablet 50 mg  50 mg Oral Nightly PRN    atorvastatin (LIPITOR) tablet 20 mg  20 mg Oral Nightly    aspirin EC tablet 81 mg  81 mg Oral Daily    Vitamin D (CHOLECALCIFEROL) tablet 2,000 Units  2,000 Units Oral Daily    DULoxetine (CYMBALTA) extended release capsule 60 mg  60 mg Oral BID    lamoTRIgine (LAMICTAL) tablet 150 mg  150 mg Oral BID      Hospital Problems: Principal Problem:    Major depressive disorder without psychotic features  Resolved Problems:    * No resolved hospital problems. *      Subjective:   Medication side effects: none      Mental Status Exam  Sensorium: alert  Orientation: oriented to time, place, person and situation  Relations: cooperative  Eye Contact: appropriate  Appearance: shows no evidence of impairment  Thought Process: normal rate of thoughts, fair abstract reasoning/computation, and logical    Thought Content: no evidence of impairment   Suicidal: denies   Homicidal: denies   Mood: is anxious   Affect: stable  Memory: shows no evidence of impairment     Concentration: 
without psychosis.  Generalized anxiety disorder.    Axis II.  None.    Axis III.  History of craniotomy and blood clot extraction.  History nonalcoholic liver disease.  History hepatitis C.  Dyslipidemia.  History lumbar and cervical disc disease with recurrent back pain.  Hypertension.    Disposition: Discharged to self.  Follow-up [facility] with Dr. Caldwell this week for medication check.  Follow-up being set up with therapist at [facility].  Medications are as listed above.    Amitriptyline 100 mg tablet #30 amlodipine 5 mg tablet 1 daily.  Aspirin EC 81 mg tablet 1 daily.  Atorvastatin 20 mg tablet at bedtime.  Clonazepam 1 mg tablet p.o. at 3 times daily as needed anxiety.  Duloxetine 60 mg capsule 1 twice daily #120 lamotrigine 150 mg tablet 1 twice daily #60.  Mirtazapine 45 mg tablet 1 at bedtime #30 quetiapine 200 mg tablet 1 at bedtime #30.  Vitamin D 2000 units daily      Time spent.  45 minutes including interview dictation orders prescriptions.  
yes
yes

## 2025-08-25 ENCOUNTER — OFFICE VISIT (OUTPATIENT)
Age: 70
End: 2025-08-25
Payer: MEDICARE

## 2025-08-25 VITALS
TEMPERATURE: 97 F | DIASTOLIC BLOOD PRESSURE: 80 MMHG | HEIGHT: 72 IN | OXYGEN SATURATION: 94 % | WEIGHT: 194 LBS | SYSTOLIC BLOOD PRESSURE: 148 MMHG | HEART RATE: 59 BPM | BODY MASS INDEX: 26.28 KG/M2

## 2025-08-25 DIAGNOSIS — R41.89 COGNITIVE DECLINE: Primary | ICD-10-CM

## 2025-08-25 DIAGNOSIS — R41.82 ALTERED MENTAL STATUS, UNSPECIFIED ALTERED MENTAL STATUS TYPE: ICD-10-CM

## 2025-08-25 DIAGNOSIS — H53.123 TRANSIENT VISUAL LOSS OF BOTH EYES: ICD-10-CM

## 2025-08-25 DIAGNOSIS — I67.81: ICD-10-CM

## 2025-08-25 DIAGNOSIS — Z86.73 HISTORY OF STROKE: ICD-10-CM

## 2025-08-25 PROCEDURE — 1159F MED LIST DOCD IN RCRD: CPT | Performed by: NURSE PRACTITIONER

## 2025-08-25 PROCEDURE — 1123F ACP DISCUSS/DSCN MKR DOCD: CPT | Performed by: NURSE PRACTITIONER

## 2025-08-25 PROCEDURE — 99204 OFFICE O/P NEW MOD 45 MIN: CPT | Performed by: NURSE PRACTITIONER

## 2025-08-25 PROCEDURE — 1160F RVW MEDS BY RX/DR IN RCRD: CPT | Performed by: NURSE PRACTITIONER

## 2025-08-25 RX ORDER — DEXTROAMPHETAMINE SACCHARATE, AMPHETAMINE ASPARTATE, DEXTROAMPHETAMINE SULFATE AND AMPHETAMINE SULFATE 5; 5; 5; 5 MG/1; MG/1; MG/1; MG/1
20 TABLET ORAL DAILY
COMMUNITY